# Patient Record
Sex: FEMALE | Race: WHITE | Employment: OTHER | URBAN - METROPOLITAN AREA
[De-identification: names, ages, dates, MRNs, and addresses within clinical notes are randomized per-mention and may not be internally consistent; named-entity substitution may affect disease eponyms.]

---

## 2017-04-06 PROBLEM — G43.009 MIGRAINE WITHOUT AURA AND WITHOUT STATUS MIGRAINOSUS, NOT INTRACTABLE: Status: ACTIVE | Noted: 2017-04-06

## 2017-04-19 PROBLEM — M15.9 GENERALIZED OA: Status: ACTIVE | Noted: 2017-04-19

## 2017-12-10 ENCOUNTER — APPOINTMENT (OUTPATIENT)
Dept: GENERAL RADIOLOGY | Age: 72
End: 2017-12-10
Payer: MEDICARE

## 2017-12-10 ENCOUNTER — HOSPITAL ENCOUNTER (EMERGENCY)
Age: 72
Discharge: HOME | End: 2017-12-10
Attending: EMERGENCY MEDICINE
Payer: MEDICARE

## 2017-12-10 VITALS
DIASTOLIC BLOOD PRESSURE: 73 MMHG | SYSTOLIC BLOOD PRESSURE: 135 MMHG | HEIGHT: 61 IN | OXYGEN SATURATION: 98 % | TEMPERATURE: 97.6 F | RESPIRATION RATE: 18 BRPM | HEART RATE: 72 BPM | BODY MASS INDEX: 33.99 KG/M2 | WEIGHT: 180 LBS

## 2017-12-10 DIAGNOSIS — S86.912A STRAIN OF LEFT KNEE, INITIAL ENCOUNTER: Primary | ICD-10-CM

## 2017-12-10 PROCEDURE — 99283 EMERGENCY DEPT VISIT LOW MDM: CPT

## 2017-12-10 PROCEDURE — 6360000002 HC RX W HCPCS: Performed by: NURSE PRACTITIONER

## 2017-12-10 PROCEDURE — 96372 THER/PROPH/DIAG INJ SC/IM: CPT

## 2017-12-10 PROCEDURE — 6370000000 HC RX 637 (ALT 250 FOR IP): Performed by: NURSE PRACTITIONER

## 2017-12-10 PROCEDURE — 73562 X-RAY EXAM OF KNEE 3: CPT

## 2017-12-10 RX ORDER — HYDROCODONE BITARTRATE AND ACETAMINOPHEN 5; 325 MG/1; MG/1
2 TABLET ORAL ONCE
Status: COMPLETED | OUTPATIENT
Start: 2017-12-10 | End: 2017-12-10

## 2017-12-10 RX ORDER — HYDROCODONE BITARTRATE AND ACETAMINOPHEN 5; 325 MG/1; MG/1
1 TABLET ORAL EVERY 6 HOURS PRN
Qty: 20 TABLET | Refills: 0 | Status: SHIPPED | OUTPATIENT
Start: 2017-12-10 | End: 2018-01-25 | Stop reason: ALTCHOICE

## 2017-12-10 RX ORDER — ORPHENADRINE CITRATE 30 MG/ML
60 INJECTION INTRAMUSCULAR; INTRAVENOUS ONCE
Status: COMPLETED | OUTPATIENT
Start: 2017-12-10 | End: 2017-12-10

## 2017-12-10 RX ADMIN — ORPHENADRINE CITRATE 60 MG: 30 INJECTION INTRAMUSCULAR; INTRAVENOUS at 15:56

## 2017-12-10 RX ADMIN — HYDROCODONE BITARTRATE AND ACETAMINOPHEN 2 TABLET: 5; 325 TABLET ORAL at 15:56

## 2017-12-10 ASSESSMENT — PAIN DESCRIPTION - LOCATION: LOCATION: KNEE;LEG

## 2017-12-10 ASSESSMENT — PAIN DESCRIPTION - DESCRIPTORS: DESCRIPTORS: SHARP;STABBING;CRAMPING

## 2017-12-10 ASSESSMENT — PAIN SCALES - GENERAL
PAINLEVEL_OUTOF10: 10

## 2017-12-10 ASSESSMENT — ENCOUNTER SYMPTOMS
COLOR CHANGE: 0
BACK PAIN: 0

## 2017-12-10 ASSESSMENT — PAIN DESCRIPTION - PAIN TYPE: TYPE: ACUTE PAIN

## 2017-12-10 ASSESSMENT — PAIN DESCRIPTION - ORIENTATION: ORIENTATION: LEFT;POSTERIOR;LOWER

## 2017-12-10 NOTE — ED NOTES
Pt presents to ED with c/o L knee/leg pain; pt states she was painting trim on the floor when she had her knee bent and she heard a pop. Pt states she felt a snap and a numb feeling and could not put pressure on it. Limited movement, Pedal pulse 2+, pain located behind knee cap and travels up thigh stopping before the hip. Rates as 10/10; constant, deep pain.      Laurence Anne RN  12/10/17 6056

## 2017-12-10 NOTE — ED PROVIDER NOTES
I was present in the ED during this patient's evaluation and management by the Advance Practice Provider and was available to address any concerns about their medical management.     Junaid Burgos MD  Attending, Emergency Department      Nithin Michaud MD  12/10/17 5371

## 2017-12-10 NOTE — ED PROVIDER NOTES
breath sounds normal. No respiratory distress. She has no wheezes. She has no rales. Musculoskeletal:        Left knee: She exhibits normal range of motion, no swelling, no deformity and no erythema. Tenderness found. Lateral joint line tenderness noted. Neurological: She is alert and oriented to person, place, and time. Skin: Skin is warm and dry. No rash noted. She is not diaphoretic. Psychiatric: She has a normal mood and affect. Her behavior is normal.   Vitals reviewed. DIAGNOSTIC RESULTS     RADIOLOGY:   Non-plain film images such as CT, Ultrasound and MRI are read by the radiologist. [facility] are visualized and preliminarily interpreted by the emergency physician with the below findings:    Interpretation per the Radiologist below, if available at the time of this note:    Xr Knee Left (3 Views)    Result Date: 12/10/2017  EXAMINATION: 3 VIEWS OF THE LEFT KNEE 12/10/2017 3:37 pm COMPARISON: None. HISTORY: ORDERING SYSTEM PROVIDED HISTORY: pain TECHNOLOGIST PROVIDED HISTORY: Reason for exam:->pain Ordering Physician Provided Reason for Exam: Lt knee pain Acuity: Acute Type of Exam: Initial Mechanism of Injury: heard a \"pop\" when standing FINDINGS: No knee joint effusion. Mild tricompartmental degenerative changes. No significant joint space narrowing. No acute bony process. Mild tricompartmental degenerative changes. No acute bony process.      EMERGENCY DEPARTMENT COURSE and DIFFERENTIAL DIAGNOSIS/MDM:   Vitals:    Vitals:    12/10/17 1523   BP: 135/73   Pulse: 72   Resp: 18   Temp: 97.6 °F (36.4 °C)   SpO2: 98%   Weight: 180 lb (81.6 kg)   Height: 5' 1\" (1.549 m)       MEDICATIONS GIVEN IN THE ED:  Medications   HYDROcodone-acetaminophen (NORCO) 5-325 MG per tablet 2 tablet (2 tablets Oral Given 12/10/17 1556)   orphenadrine (NORFLEX) injection 60 mg (60 mg Intramuscular Given 12/10/17 1556)       CLINICAL DECISION MAKING:  The patient presented alert with a nontoxic appearance and was seen in conjunction with Dr. Pedro Pablo Almanza. Imaging was negative for acute findings. An ace wrap was applied. The application was checked by me and was appropriate; the LLE remained neurovascularly intact. Follow up with pcp, return to ED if condition worsens. A prescription was written for norco. The patient was advised to not drink alcohol, drive, or operate heavy machinery while taking the medication. FINAL IMPRESSION      1. Strain of left knee, initial encounter          DISPOSITION/PLAN   DISPOSITION Decision to Discharge    PATIENT REFERRED TO:   Carolina Shepherd  [address] [place] [address]  [phone]    Schedule an appointment as soon as possible for a visit in 2 days      [address] [facility] ED  [address] [facility]  [phone]    If symptoms worsen      DISCHARGE MEDICATIONS:     New Prescriptions    HYDROCODONE-ACETAMINOPHEN (NORCO) 5-325 MG PER TABLET    Take 1 tablet by mouth every 6 hours as needed for Pain (WARNING:  May cause drowsiness.  May impair ability to operate vehicles or machinery.  Do not use in combination with alcohol.) .            (Please note that portions of this note were completed with a voice recognition program.  Efforts were made to edit the dictations but occasionally words are mis-transcribed.)    CHEMA Vasquez NP  12/10/17 4333

## 2018-01-25 PROBLEM — R07.89 ATYPICAL CHEST PAIN: Status: ACTIVE | Noted: 2018-01-25

## 2018-03-06 PROBLEM — M25.511 ACUTE PAIN OF RIGHT SHOULDER: Status: ACTIVE | Noted: 2018-03-06

## 2018-03-06 PROBLEM — M75.41 IMPINGEMENT SYNDROME OF RIGHT SHOULDER: Status: ACTIVE | Noted: 2018-03-06

## 2018-03-30 ENCOUNTER — HOSPITAL ENCOUNTER (EMERGENCY)
Age: 73
Discharge: HOME | End: 2018-03-30
Attending: EMERGENCY MEDICINE
Payer: MEDICARE

## 2018-03-30 ENCOUNTER — APPOINTMENT (OUTPATIENT)
Dept: GENERAL RADIOLOGY | Age: 73
End: 2018-03-30
Payer: MEDICARE

## 2018-03-30 VITALS
SYSTOLIC BLOOD PRESSURE: 120 MMHG | BODY MASS INDEX: 33.96 KG/M2 | DIASTOLIC BLOOD PRESSURE: 67 MMHG | WEIGHT: 179.9 LBS | RESPIRATION RATE: 22 BRPM | HEIGHT: 61 IN | OXYGEN SATURATION: 98 % | HEART RATE: 88 BPM | TEMPERATURE: 97.7 F

## 2018-03-30 DIAGNOSIS — J10.1 INFLUENZA B: Primary | ICD-10-CM

## 2018-03-30 LAB
DIRECT EXAM: ABNORMAL
Lab: ABNORMAL
SPECIMEN DESCRIPTION: ABNORMAL
STATUS: ABNORMAL

## 2018-03-30 PROCEDURE — 99285 EMERGENCY DEPT VISIT HI MDM: CPT

## 2018-03-30 PROCEDURE — 6370000000 HC RX 637 (ALT 250 FOR IP): Performed by: PHYSICIAN ASSISTANT

## 2018-03-30 PROCEDURE — 87804 INFLUENZA ASSAY W/OPTIC: CPT

## 2018-03-30 PROCEDURE — 71046 X-RAY EXAM CHEST 2 VIEWS: CPT

## 2018-03-30 RX ORDER — BENZONATATE 100 MG/1
100 CAPSULE ORAL 3 TIMES DAILY PRN
Qty: 21 CAPSULE | Refills: 0 | Status: SHIPPED | OUTPATIENT
Start: 2018-03-30 | End: 2018-04-06

## 2018-03-30 RX ORDER — GUAIFENESIN 100 MG/5ML
200 SOLUTION ORAL ONCE
Status: COMPLETED | OUTPATIENT
Start: 2018-03-30 | End: 2018-03-30

## 2018-03-30 RX ORDER — ACETAMINOPHEN AND CODEINE PHOSPHATE 300; 30 MG/1; MG/1
1 TABLET ORAL EVERY 6 HOURS PRN
Qty: 12 TABLET | Refills: 0 | Status: SHIPPED | OUTPATIENT
Start: 2018-03-30 | End: 2018-04-02

## 2018-03-30 RX ORDER — IPRATROPIUM BROMIDE AND ALBUTEROL SULFATE 2.5; .5 MG/3ML; MG/3ML
1 SOLUTION RESPIRATORY (INHALATION) ONCE
Status: DISCONTINUED | OUTPATIENT
Start: 2018-03-30 | End: 2018-03-30 | Stop reason: HOSPADM

## 2018-03-30 RX ADMIN — GUAIFENESIN 200 MG: 100 SOLUTION ORAL at 11:11

## 2018-03-30 ASSESSMENT — PAIN DESCRIPTION - DESCRIPTORS: DESCRIPTORS: CONSTANT;SORE

## 2018-03-30 ASSESSMENT — PAIN DESCRIPTION - LOCATION: LOCATION: THROAT

## 2018-03-30 ASSESSMENT — ENCOUNTER SYMPTOMS
SORE THROAT: 1
WHEEZING: 0
NAUSEA: 1
DIARRHEA: 1
COUGH: 1
ABDOMINAL PAIN: 0
SHORTNESS OF BREATH: 1
VOMITING: 0
BACK PAIN: 1

## 2018-03-30 ASSESSMENT — PAIN SCALES - GENERAL
PAINLEVEL_OUTOF10: 10
PAINLEVEL_OUTOF10: 8

## 2018-03-30 ASSESSMENT — PAIN DESCRIPTION - PAIN TYPE: TYPE: ACUTE PAIN

## 2019-01-25 ENCOUNTER — OFFICE VISIT (OUTPATIENT)
Dept: FAMILY MEDICINE CLINIC | Age: 74
End: 2019-01-25
Payer: MEDICARE

## 2019-01-25 VITALS
RESPIRATION RATE: 16 BRPM | HEART RATE: 67 BPM | HEIGHT: 61 IN | DIASTOLIC BLOOD PRESSURE: 86 MMHG | BODY MASS INDEX: 34.55 KG/M2 | SYSTOLIC BLOOD PRESSURE: 137 MMHG | WEIGHT: 183 LBS

## 2019-01-25 DIAGNOSIS — F32.0 CURRENT MILD EPISODE OF MAJOR DEPRESSIVE DISORDER, UNSPECIFIED WHETHER RECURRENT (HCC): ICD-10-CM

## 2019-01-25 DIAGNOSIS — H92.02 OTALGIA OF LEFT EAR: ICD-10-CM

## 2019-01-25 DIAGNOSIS — Z12.39 SCREENING FOR BREAST CANCER: ICD-10-CM

## 2019-01-25 DIAGNOSIS — I10 ESSENTIAL HYPERTENSION: Primary | ICD-10-CM

## 2019-01-25 DIAGNOSIS — M15.9 GENERALIZED OA: ICD-10-CM

## 2019-01-25 DIAGNOSIS — G43.009 MIGRAINE WITHOUT AURA AND WITHOUT STATUS MIGRAINOSUS, NOT INTRACTABLE: ICD-10-CM

## 2019-01-25 DIAGNOSIS — F32.0 MAJOR DEPRESSIVE DISORDER, SINGLE EPISODE, MILD (HCC): ICD-10-CM

## 2019-01-25 DIAGNOSIS — J44.9 CHRONIC OBSTRUCTIVE PULMONARY DISEASE, UNSPECIFIED COPD TYPE (HCC): ICD-10-CM

## 2019-01-25 DIAGNOSIS — M75.41 IMPINGEMENT SYNDROME OF RIGHT SHOULDER: ICD-10-CM

## 2019-01-25 DIAGNOSIS — K21.9 GASTROESOPHAGEAL REFLUX DISEASE, ESOPHAGITIS PRESENCE NOT SPECIFIED: ICD-10-CM

## 2019-01-25 DIAGNOSIS — E66.9 OBESITY (BMI 30-39.9): ICD-10-CM

## 2019-01-25 PROCEDURE — G8427 DOCREV CUR MEDS BY ELIG CLIN: HCPCS | Performed by: FAMILY MEDICINE

## 2019-01-25 PROCEDURE — G8400 PT W/DXA NO RESULTS DOC: HCPCS | Performed by: FAMILY MEDICINE

## 2019-01-25 PROCEDURE — 3023F SPIROM DOC REV: CPT | Performed by: FAMILY MEDICINE

## 2019-01-25 PROCEDURE — 4040F PNEUMOC VAC/ADMIN/RCVD: CPT | Performed by: FAMILY MEDICINE

## 2019-01-25 PROCEDURE — G8417 CALC BMI ABV UP PARAM F/U: HCPCS | Performed by: FAMILY MEDICINE

## 2019-01-25 PROCEDURE — G8484 FLU IMMUNIZE NO ADMIN: HCPCS | Performed by: FAMILY MEDICINE

## 2019-01-25 PROCEDURE — G8926 SPIRO NO PERF OR DOC: HCPCS | Performed by: FAMILY MEDICINE

## 2019-01-25 PROCEDURE — 1090F PRES/ABSN URINE INCON ASSESS: CPT | Performed by: FAMILY MEDICINE

## 2019-01-25 PROCEDURE — 1036F TOBACCO NON-USER: CPT | Performed by: FAMILY MEDICINE

## 2019-01-25 PROCEDURE — 1101F PT FALLS ASSESS-DOCD LE1/YR: CPT | Performed by: FAMILY MEDICINE

## 2019-01-25 PROCEDURE — 99214 OFFICE O/P EST MOD 30 MIN: CPT | Performed by: FAMILY MEDICINE

## 2019-01-25 PROCEDURE — 3017F COLORECTAL CA SCREEN DOC REV: CPT | Performed by: FAMILY MEDICINE

## 2019-01-25 PROCEDURE — 1123F ACP DISCUSS/DSCN MKR DOCD: CPT | Performed by: FAMILY MEDICINE

## 2019-01-25 RX ORDER — TRAMADOL HYDROCHLORIDE 50 MG/1
50 TABLET ORAL DAILY PRN
Qty: 30 TABLET | Refills: 0 | Status: SHIPPED | OUTPATIENT
Start: 2019-01-25 | End: 2019-02-24

## 2019-01-25 RX ORDER — TRAMADOL HYDROCHLORIDE 50 MG/1
50 TABLET ORAL DAILY PRN
Status: CANCELLED | OUTPATIENT
Start: 2019-01-25 | End: 2019-02-24

## 2019-01-25 RX ORDER — OMEPRAZOLE 20 MG/1
CAPSULE, DELAYED RELEASE ORAL
Qty: 90 CAPSULE | Refills: 0 | Status: SHIPPED | OUTPATIENT
Start: 2019-01-25 | End: 2019-07-16 | Stop reason: SDUPTHER

## 2019-01-25 RX ORDER — ESCITALOPRAM OXALATE 10 MG/1
10 TABLET ORAL DAILY
Qty: 30 TABLET | Refills: 5 | Status: ON HOLD | OUTPATIENT
Start: 2019-01-25 | End: 2019-10-31

## 2019-01-25 ASSESSMENT — ENCOUNTER SYMPTOMS
PHOTOPHOBIA: 0
ABDOMINAL DISTENTION: 0
CHEST TIGHTNESS: 0
FACIAL SWELLING: 0
BACK PAIN: 1
TROUBLE SWALLOWING: 0
SHORTNESS OF BREATH: 0
DIARRHEA: 0
CONSTIPATION: 0
SINUS PRESSURE: 0
EYE DISCHARGE: 0
COLOR CHANGE: 0
SORE THROAT: 0
APNEA: 0
WHEEZING: 0
EYE PAIN: 0
VOMITING: 0
ANAL BLEEDING: 0
NAUSEA: 0
ABDOMINAL PAIN: 0

## 2019-04-29 ENCOUNTER — OFFICE VISIT (OUTPATIENT)
Dept: FAMILY MEDICINE CLINIC | Age: 74
End: 2019-04-29
Payer: MEDICARE

## 2019-04-29 VITALS
OXYGEN SATURATION: 95 % | DIASTOLIC BLOOD PRESSURE: 81 MMHG | BODY MASS INDEX: 35.68 KG/M2 | HEART RATE: 73 BPM | WEIGHT: 189 LBS | HEIGHT: 61 IN | RESPIRATION RATE: 14 BRPM | SYSTOLIC BLOOD PRESSURE: 136 MMHG

## 2019-04-29 DIAGNOSIS — F32.1 CURRENT MODERATE EPISODE OF MAJOR DEPRESSIVE DISORDER WITHOUT PRIOR EPISODE (HCC): ICD-10-CM

## 2019-04-29 DIAGNOSIS — J44.9 CHRONIC OBSTRUCTIVE PULMONARY DISEASE, UNSPECIFIED COPD TYPE (HCC): ICD-10-CM

## 2019-04-29 DIAGNOSIS — G43.009 MIGRAINE WITHOUT AURA AND WITHOUT STATUS MIGRAINOSUS, NOT INTRACTABLE: ICD-10-CM

## 2019-04-29 DIAGNOSIS — M15.9 GENERALIZED OA: ICD-10-CM

## 2019-04-29 DIAGNOSIS — I10 ESSENTIAL HYPERTENSION: Primary | ICD-10-CM

## 2019-04-29 DIAGNOSIS — K57.92 ACUTE DIVERTICULITIS: ICD-10-CM

## 2019-04-29 DIAGNOSIS — E66.9 OBESITY (BMI 30-39.9): ICD-10-CM

## 2019-04-29 DIAGNOSIS — K21.00 GASTROESOPHAGEAL REFLUX DISEASE WITH ESOPHAGITIS: ICD-10-CM

## 2019-04-29 DIAGNOSIS — M75.41 IMPINGEMENT SYNDROME OF RIGHT SHOULDER: ICD-10-CM

## 2019-04-29 PROCEDURE — G8400 PT W/DXA NO RESULTS DOC: HCPCS | Performed by: FAMILY MEDICINE

## 2019-04-29 PROCEDURE — 3023F SPIROM DOC REV: CPT | Performed by: FAMILY MEDICINE

## 2019-04-29 PROCEDURE — 1036F TOBACCO NON-USER: CPT | Performed by: FAMILY MEDICINE

## 2019-04-29 PROCEDURE — 3017F COLORECTAL CA SCREEN DOC REV: CPT | Performed by: FAMILY MEDICINE

## 2019-04-29 PROCEDURE — G8427 DOCREV CUR MEDS BY ELIG CLIN: HCPCS | Performed by: FAMILY MEDICINE

## 2019-04-29 PROCEDURE — 4040F PNEUMOC VAC/ADMIN/RCVD: CPT | Performed by: FAMILY MEDICINE

## 2019-04-29 PROCEDURE — 1090F PRES/ABSN URINE INCON ASSESS: CPT | Performed by: FAMILY MEDICINE

## 2019-04-29 PROCEDURE — 99214 OFFICE O/P EST MOD 30 MIN: CPT | Performed by: FAMILY MEDICINE

## 2019-04-29 PROCEDURE — 1123F ACP DISCUSS/DSCN MKR DOCD: CPT | Performed by: FAMILY MEDICINE

## 2019-04-29 PROCEDURE — G8926 SPIRO NO PERF OR DOC: HCPCS | Performed by: FAMILY MEDICINE

## 2019-04-29 PROCEDURE — G8417 CALC BMI ABV UP PARAM F/U: HCPCS | Performed by: FAMILY MEDICINE

## 2019-04-29 RX ORDER — TOPIRAMATE 25 MG/1
25 TABLET ORAL 2 TIMES DAILY
Qty: 60 TABLET | Refills: 2 | Status: SHIPPED | OUTPATIENT
Start: 2019-04-29 | End: 2019-08-06 | Stop reason: SDUPTHER

## 2019-04-29 RX ORDER — OMEPRAZOLE 20 MG/1
20 CAPSULE, DELAYED RELEASE ORAL
Qty: 58 CAPSULE | Refills: 0 | Status: SHIPPED | OUTPATIENT
Start: 2019-04-29 | End: 2020-06-12 | Stop reason: SDUPTHER

## 2019-04-29 RX ORDER — BUPROPION HYDROCHLORIDE 150 MG/1
150 TABLET ORAL EVERY MORNING
Qty: 30 TABLET | Refills: 2 | Status: SHIPPED | OUTPATIENT
Start: 2019-04-29 | End: 2019-08-06 | Stop reason: SDUPTHER

## 2019-04-29 RX ORDER — CIPROFLOXACIN 500 MG/1
500 TABLET, FILM COATED ORAL 2 TIMES DAILY
Qty: 14 TABLET | Refills: 0 | Status: SHIPPED | OUTPATIENT
Start: 2019-04-29 | End: 2019-05-06

## 2019-04-29 ASSESSMENT — ENCOUNTER SYMPTOMS
CONSTIPATION: 0
PHOTOPHOBIA: 0
DIARRHEA: 0
APNEA: 0
SHORTNESS OF BREATH: 0
SORE THROAT: 0
VOMITING: 0
TROUBLE SWALLOWING: 0
ABDOMINAL DISTENTION: 0
NAUSEA: 0
FACIAL SWELLING: 0
BACK PAIN: 1
EYE PAIN: 0
COLOR CHANGE: 0
CHEST TIGHTNESS: 0
ABDOMINAL PAIN: 1
WHEEZING: 0
ANAL BLEEDING: 0
SINUS PRESSURE: 0
EYE DISCHARGE: 0

## 2019-04-29 NOTE — PROGRESS NOTES
Homer Sharma MD, PhD FAAFP  [address] [facility] [address]      Sarah Santiago is a 72 y.o. female who presents today for her medical conditions/complaints as noted below. Sarah Santiago is c/o of   Chief Complaint   Patient presents with    Hypertension     ck up    Depression         HPI:     Hypertension   This is a chronic problem. The problem is unchanged. The problem is controlled. Pertinent negatives include no chest pain, neck pain, palpitations or shortness of breath. Abdominal Pain   This is a new problem. The current episode started in the past 7 days. The onset quality is gradual. The pain is located in the LLQ. Associated symptoms include arthralgias. Pertinent negatives include no constipation, diarrhea, fever, frequency, hematuria, nausea or vomiting.        No results found for: LABA1C          ( goal A1C is < 7)   No results found for: LABMICR  No results found for: LDLCHOLESTEROL, LDLCALC    (goal LDL is <100)   AST (U/L)   Date Value   12/10/2011 17     ALT (U/L)   Date Value   12/10/2011 19     BUN (mg/dL)   Date Value   03/30/2014 16     BP Readings from Last 3 Encounters:   04/29/19 136/81   01/25/19 137/86   12/18/18 124/78          (goal 120/80)    Past Medical History:   Diagnosis Date    Arthritis 2012    GENERALIZED    Fibromyalgia 2012    GERD (gastroesophageal reflux disease) DX PRIOR TO 2004    ON RX     GERD (gastroesophageal reflux disease)     Hyperlipidemia     NO RX ALLERGIC TO STATINS    Hypertension     Kidney stone 2012 & 2013    X 2-SEVERAL STONES EACH TIME, PASSED ON OWN    Pneumonia 2012    Recurrent UTI     Sleep apnea 2012    HAS MACHINE BUT DOES NOT USE    Vision abnormalities 2014    RT DISTORTED, LT DECREASED    Wears glasses       Past Surgical History:   Procedure Laterality Date   [address] [facility] TOTAL    TONSILLECTOMY  1962    VITRECTOMY Right 2014       Family History   Problem Relation Age of Onset    Cancer Maternal Grandmother         UNKNOWN       Social History     Tobacco Use    Smoking status: Former Smoker     Last attempt to quit: 10/31/1994     Years since quittin.5    Smokeless tobacco: Never Used   Substance Use Topics    Alcohol use: Yes     Comment: 2 DRINKS A WEEK      Current Outpatient Medications   Medication Sig Dispense Refill    buPROPion (WELLBUTRIN XL) 150 MG extended release tablet Take 1 tablet by mouth every morning 30 tablet 2    topiramate (TOPAMAX) 25 MG tablet Take 1 tablet by mouth 2 times daily 60 tablet 2    omeprazole (PRILOSEC) 20 MG delayed release capsule Take 1 capsule by mouth 2 times daily (before meals) 58 capsule 0    ciprofloxacin (CIPRO) 500 MG tablet Take 1 tablet by mouth 2 times daily for 7 days 14 tablet 0    omeprazole (PRILOSEC) 20 MG delayed release capsule TAKE ONE CAPSULE BY MOUTH ONCE DAILY 90 capsule 0    escitalopram (LEXAPRO) 10 MG tablet Take 1 tablet by mouth daily 30 tablet 5    atropine 1 % ophthalmic solution Place 1 drop into the right eye 2 times daily. 1 Bottle 0     No current facility-administered medications for this visit.       Allergies   Allergen Reactions    Cefdinir Itching    Morphine Nausea And Vomiting    Avelox [Moxifloxacin]      UNSURE OF REACTION      Ciprofloxacin Itching    Quinolones      UNSURE OF REACTION      Statins Other (See Comments)     MUSCLE WEAKNESS      Sulfa Antibiotics Itching       Health Maintenance   Topic Date Due    Hepatitis C screen  1945    DTaP/Tdap/Td vaccine (1 - Tdap) 1966    Shingles Vaccine (1 of 2) 1997    Colon cancer screen colonoscopy  1997    Pneumococcal 65+ years Vaccine (1 of 2 - PCV13) 2012    Flu vaccine (Season Ended) 2019    Breast cancer screen  2019    Lipid screen  2023    DEXA (modify frequency per FRAX score)  Completed       Subjective:      Review of Systems   Constitutional: Negative for fatigue, fever and unexpected weight change. HENT: Negative for congestion, ear discharge, facial swelling, sinus pressure, sore throat and trouble swallowing. Eyes: Negative for photophobia, pain and discharge. Respiratory: Negative for apnea, chest tightness, shortness of breath and wheezing. Cardiovascular: Negative for chest pain and palpitations. Gastrointestinal: Positive for abdominal pain. Negative for abdominal distention, anal bleeding, constipation, diarrhea, nausea and vomiting. Endocrine: Negative for cold intolerance, heat intolerance, polydipsia, polyphagia and polyuria. Genitourinary: Negative for difficulty urinating, flank pain, frequency and hematuria. Musculoskeletal: Positive for arthralgias and back pain. Negative for gait problem and neck pain. Skin: Negative for color change and rash. Neurological: Negative for dizziness, syncope, facial asymmetry, speech difficulty and light-headedness. Hematological: Negative for adenopathy. Psychiatric/Behavioral: Positive for dysphoric mood and sleep disturbance. Negative for agitation, behavioral problems, confusion, hallucinations and suicidal ideas. The patient is nervous/anxious. The patient is not hyperactive. Objective:     Physical Exam   Constitutional: She is oriented to person, place, and time. She appears well-developed. No distress. HENT:   Head: Normocephalic. Neck: Normal range of motion. Neck supple. No thyromegaly present. Cardiovascular: Normal rate, regular rhythm and normal heart sounds. No murmur heard. Pulmonary/Chest: Breath sounds normal. She has no wheezes. She has no rales. She exhibits no tenderness. Abdominal: Soft. Bowel sounds are normal. She exhibits no distension and no mass. There is tenderness in the left upper quadrant. There is no rebound and no guarding.    Musculoskeletal: Normal 7:07 PMVisit Information    Have you changed or started any medications since your last visit including any over-the-counter medicines, vitamins, or herbal medicines? no   Are you having any side effects from any of your medications? -  no  Have you stopped taking any of your medications? Is so, why? -  no    Have you seen any other physician or provider since your last visit? No  Have you had any other diagnostic tests since your last visit? No  Have you been seen in the emergency room and/or had an admission to a hospital since we last saw you? No  Have you had your routine dental cleaning in the past 6 months? no    Have you activated your [employer] account? If not, what are your barriers?  No:     Patient Care Team:  Kumar Akhtar MD as PCP - Gina Yanez MD as PCP - [facility] Attributed Provider    Medical History Review  Past Medical, Family, and Social History reviewed and does not contribute to the patient presenting condition    Health Maintenance   Topic Date Due    Hepatitis C screen  1945    DTaP/Tdap/Td vaccine (1 - Tdap) 11/08/1966    Shingles Vaccine (1 of 2) 11/08/1997    Colon cancer screen colonoscopy  11/08/1997    Pneumococcal 65+ years Vaccine (1 of 2 - PCV13) 11/08/2012    Flu vaccine (Season Ended) 09/01/2019    Breast cancer screen  12/19/2019    Lipid screen  12/19/2023    DEXA (modify frequency per FRAX score)  Completed

## 2019-05-13 ENCOUNTER — OFFICE VISIT (OUTPATIENT)
Dept: FAMILY MEDICINE CLINIC | Age: 74
End: 2019-05-13
Payer: MEDICARE

## 2019-05-13 VITALS
WEIGHT: 186.6 LBS | BODY MASS INDEX: 35.23 KG/M2 | HEART RATE: 82 BPM | RESPIRATION RATE: 16 BRPM | SYSTOLIC BLOOD PRESSURE: 136 MMHG | HEIGHT: 61 IN | DIASTOLIC BLOOD PRESSURE: 86 MMHG | OXYGEN SATURATION: 97 %

## 2019-05-13 DIAGNOSIS — M15.9 GENERALIZED OA: ICD-10-CM

## 2019-05-13 DIAGNOSIS — G43.009 MIGRAINE WITHOUT AURA AND WITHOUT STATUS MIGRAINOSUS, NOT INTRACTABLE: ICD-10-CM

## 2019-05-13 DIAGNOSIS — I10 ESSENTIAL HYPERTENSION: Primary | ICD-10-CM

## 2019-05-13 DIAGNOSIS — M54.40 ACUTE RIGHT-SIDED LOW BACK PAIN WITH SCIATICA, SCIATICA LATERALITY UNSPECIFIED: ICD-10-CM

## 2019-05-13 DIAGNOSIS — J44.9 CHRONIC OBSTRUCTIVE PULMONARY DISEASE, UNSPECIFIED COPD TYPE (HCC): ICD-10-CM

## 2019-05-13 DIAGNOSIS — F32.0 MAJOR DEPRESSIVE DISORDER, SINGLE EPISODE, MILD (HCC): ICD-10-CM

## 2019-05-13 DIAGNOSIS — E66.9 OBESITY (BMI 30-39.9): ICD-10-CM

## 2019-05-13 DIAGNOSIS — K21.9 GASTROESOPHAGEAL REFLUX DISEASE, ESOPHAGITIS PRESENCE NOT SPECIFIED: ICD-10-CM

## 2019-05-13 DIAGNOSIS — M75.41 IMPINGEMENT SYNDROME OF RIGHT SHOULDER: ICD-10-CM

## 2019-05-13 DIAGNOSIS — K21.00 GASTROESOPHAGEAL REFLUX DISEASE WITH ESOPHAGITIS: ICD-10-CM

## 2019-05-13 PROCEDURE — G8427 DOCREV CUR MEDS BY ELIG CLIN: HCPCS | Performed by: FAMILY MEDICINE

## 2019-05-13 PROCEDURE — 1123F ACP DISCUSS/DSCN MKR DOCD: CPT | Performed by: FAMILY MEDICINE

## 2019-05-13 PROCEDURE — G8417 CALC BMI ABV UP PARAM F/U: HCPCS | Performed by: FAMILY MEDICINE

## 2019-05-13 PROCEDURE — 3017F COLORECTAL CA SCREEN DOC REV: CPT | Performed by: FAMILY MEDICINE

## 2019-05-13 PROCEDURE — 1036F TOBACCO NON-USER: CPT | Performed by: FAMILY MEDICINE

## 2019-05-13 PROCEDURE — 1090F PRES/ABSN URINE INCON ASSESS: CPT | Performed by: FAMILY MEDICINE

## 2019-05-13 PROCEDURE — 96372 THER/PROPH/DIAG INJ SC/IM: CPT | Performed by: FAMILY MEDICINE

## 2019-05-13 PROCEDURE — 99214 OFFICE O/P EST MOD 30 MIN: CPT | Performed by: FAMILY MEDICINE

## 2019-05-13 PROCEDURE — 3023F SPIROM DOC REV: CPT | Performed by: FAMILY MEDICINE

## 2019-05-13 PROCEDURE — 4040F PNEUMOC VAC/ADMIN/RCVD: CPT | Performed by: FAMILY MEDICINE

## 2019-05-13 PROCEDURE — G8926 SPIRO NO PERF OR DOC: HCPCS | Performed by: FAMILY MEDICINE

## 2019-05-13 PROCEDURE — G8400 PT W/DXA NO RESULTS DOC: HCPCS | Performed by: FAMILY MEDICINE

## 2019-05-13 RX ORDER — METHYLPREDNISOLONE ACETATE 40 MG/ML
40 INJECTION, SUSPENSION INTRA-ARTICULAR; INTRALESIONAL; INTRAMUSCULAR; SOFT TISSUE ONCE
Status: COMPLETED | OUTPATIENT
Start: 2019-05-13 | End: 2019-05-13

## 2019-05-13 RX ADMIN — METHYLPREDNISOLONE ACETATE 40 MG: 40 INJECTION, SUSPENSION INTRA-ARTICULAR; INTRALESIONAL; INTRAMUSCULAR; SOFT TISSUE at 15:05

## 2019-05-13 ASSESSMENT — ENCOUNTER SYMPTOMS
TROUBLE SWALLOWING: 0
APNEA: 0
EYE PAIN: 0
ABDOMINAL PAIN: 0
COLOR CHANGE: 0
SHORTNESS OF BREATH: 0
EYE DISCHARGE: 0
BACK PAIN: 1
SINUS PRESSURE: 0
SORE THROAT: 0
VOMITING: 0
DIARRHEA: 0
WHEEZING: 0
ABDOMINAL DISTENTION: 0
ANAL BLEEDING: 0
NAUSEA: 0
CONSTIPATION: 0
CHEST TIGHTNESS: 0
PHOTOPHOBIA: 0
FACIAL SWELLING: 0

## 2019-05-14 NOTE — PROGRESS NOTES
Homer Sharma MD, PhD FAAFP  [address] [facility] [address] [facility] [address]      Sarah Santiago is a 72 y.o. female who presents today for her medical conditions/complaints as noted below. Sarah Santiago is c/o of   Chief Complaint   Patient presents with    Back Pain     right side pain for a couple day. since yesterday the pain has got bad.  Hypertension         HPI:     Back Pain   This is a chronic problem. The current episode started more than 1 year ago. The problem has been gradually worsening since onset. The pain is present in the lumbar spine. The pain radiates to the right knee and right thigh. The pain is at a severity of 7/10. The pain is moderate. The pain is worse during the night. The symptoms are aggravated by coughing, position, standing, stress, twisting, lying down and bending. Stiffness is present in the morning. Associated symptoms include numbness, paresthesias and weakness. Pertinent negatives include no abdominal pain, chest pain or fever. Hypertension   This is a chronic problem. The current episode started more than 1 year ago. The problem is unchanged. The problem is controlled. Pertinent negatives include no chest pain, neck pain, palpitations or shortness of breath.        No results found for: LABA1C          ( goal A1C is < 7)   No results found for: LABMICR  No results found for: LDLCHOLESTEROL, LDLCALC    (goal LDL is <100)   AST (U/L)   Date Value   12/10/2011 17     ALT (U/L)   Date Value   12/10/2011 19     BUN (mg/dL)   Date Value   03/30/2014 16     BP Readings from Last 3 Encounters:   05/13/19 136/86   04/29/19 136/81   01/25/19 137/86          (goal 120/80)    Past Medical History:   Diagnosis Date    Arthritis 2012    GENERALIZED    Fibromyalgia 2012    GERD (gastroesophageal reflux disease) DX PRIOR TO 2004    ON RX     GERD (gastroesophageal reflux disease)     Hyperlipidemia     NO RX ALLERGIC TO STATINS  Hypertension     Kidney stone 2012 & 2013    X 2-SEVERAL STONES EACH TIME, PASSED ON OWN    Pneumonia 2012    Recurrent UTI     Sleep apnea 2012    HAS MACHINE BUT DOES NOT USE    Vision abnormalities     RT DISTORTED, LT DECREASED    Wears glasses       Past Surgical History:   Procedure Laterality Date    APPENDECTOMY  1963     SECTION, CLASSIC  1964, 0, & [address]    VITRECTOMY Right 2014       Family History   Problem Relation Age of Onset    Cancer Maternal Grandmother         UNKNOWN       Social History     Tobacco Use    Smoking status: Former Smoker     Last attempt to quit: 10/31/1994     Years since quittin.5    Smokeless tobacco: Never Used   Substance Use Topics    Alcohol use: Yes     Comment: 2 DRINKS A WEEK      Current Outpatient Medications   Medication Sig Dispense Refill    buPROPion (WELLBUTRIN XL) 150 MG extended release tablet Take 1 tablet by mouth every morning 30 tablet 2    topiramate (TOPAMAX) 25 MG tablet Take 1 tablet by mouth 2 times daily 60 tablet 2    omeprazole (PRILOSEC) 20 MG delayed release capsule Take 1 capsule by mouth 2 times daily (before meals) 58 capsule 0    escitalopram (LEXAPRO) 10 MG tablet Take 1 tablet by mouth daily 30 tablet 5    omeprazole (PRILOSEC) 20 MG delayed release capsule TAKE ONE CAPSULE BY MOUTH ONCE DAILY 90 capsule 0    atropine 1 % ophthalmic solution Place 1 drop into the right eye 2 times daily. 1 Bottle 0     No current facility-administered medications for this visit.       Allergies   Allergen Reactions    Cefdinir Itching    Morphine Nausea And Vomiting    Avelox [Moxifloxacin]      UNSURE OF REACTION      Ciprofloxacin Itching    Quinolones      UNSURE OF REACTION      Statins Other (See Comments)     MUSCLE WEAKNESS      Sulfa Antibiotics Itching       Health Maintenance   Topic Date Due    Hepatitis C screen  1945    DTaP/Tdap/Td vaccine (1 - Tdap) 11/08/1966    Shingles Vaccine (1 of 2) 11/08/1997    Colon cancer screen colonoscopy  11/08/1997    Pneumococcal 65+ years Vaccine (1 of 2 - PCV13) 11/08/2012    Flu vaccine (Season Ended) 09/01/2019    Breast cancer screen  12/19/2019    Lipid screen  12/19/2023    DEXA (modify frequency per FRAX score)  Completed       Subjective:      Review of Systems   Constitutional: Negative for fatigue, fever and unexpected weight change. HENT: Negative for congestion, ear discharge, facial swelling, sinus pressure, sore throat and trouble swallowing. Eyes: Negative for photophobia, pain and discharge. Respiratory: Negative for apnea, chest tightness, shortness of breath and wheezing. Cardiovascular: Negative for chest pain and palpitations. Gastrointestinal: Negative for abdominal distention, abdominal pain, anal bleeding, constipation, diarrhea, nausea and vomiting. Endocrine: Negative for cold intolerance, heat intolerance, polydipsia, polyphagia and polyuria. Genitourinary: Negative for difficulty urinating, flank pain, frequency and hematuria. Musculoskeletal: Positive for arthralgias, back pain and gait problem. Negative for neck pain. Skin: Negative for color change and rash. Neurological: Positive for weakness, numbness and paresthesias. Negative for dizziness, syncope, facial asymmetry, speech difficulty and light-headedness. Hematological: Negative for adenopathy. Psychiatric/Behavioral: Positive for dysphoric mood and sleep disturbance. Negative for agitation, behavioral problems, confusion, hallucinations and suicidal ideas. The patient is nervous/anxious. The patient is not hyperactive. Objective:     Physical Exam   Constitutional: She is oriented to person, place, and time. She appears well-developed. No distress. HENT:   Head: Normocephalic. Neck: Normal range of motion. Neck supple. No thyromegaly present.    Cardiovascular: Normal rate, regular rhythm and normal heart sounds. No murmur heard. Pulmonary/Chest: Breath sounds normal. She has no wheezes. She has no rales. She exhibits no tenderness. Abdominal: Soft. Bowel sounds are normal. She exhibits no distension and no mass. There is no tenderness. There is no rebound and no guarding. Musculoskeletal: Normal range of motion. She exhibits no edema. Lymphadenopathy:     She has no cervical adenopathy. Neurological: She is alert and oriented to person, place, and time. Skin: Skin is warm. No rash noted. Psychiatric: She has a normal mood and affect. Nursing note and vitals reviewed. /86   Pulse 82   Resp 16   Ht 5' 0.98\" (1.549 m)   Wt 186 lb 9.6 oz (84.6 kg)   LMP 10/31/1975 (Approximate)   SpO2 97%   BMI 35.28 kg/m²     Assessment:       Diagnosis Orders   1. Essential hypertension     2. Major depressive disorder, single episode, mild (HCC)     3. Obesity (BMI 30-39.9)     4. Gastroesophageal reflux disease with esophagitis     5. Impingement syndrome of right shoulder     6. Generalized OA     7. Migraine without aura and without status migrainosus, not intractable     8. Gastroesophageal reflux disease, esophagitis presence not specified     9. Chronic obstructive pulmonary disease, unspecified COPD type ([facility] [address]     10. Acute right-sided low back pain with sciatica, sciatica laterality unspecified            L 4 mid line and right paraspinal area cleansed with alcohol, ethylen chloride spray applied, injection with Xylocaine and Depo-Medrol given with subcutaneous needle without complications. Pt tolerated well. Plan:    Isometric lumbar exercise daily  Avoid heavy lifting or repetitive bending  The current medical regimen is effective;  continue present plan and medications. Weight reduction    Return in about 1 month (around 6/10/2019), or (trigger point lumbar steroid injection was done today), for follow up.     No orders of the defined types were placed in this encounter. Patient given educational materials - see patient instructions. Discussed use, benefit,and side effects of prescribed medications. All patient questions answered. Pt voiced understanding. Reviewed health maintenance. Instructed to continue current medications, diet and exercise. Patient agreed withtreatment plan. Follow up as directed.      Electronically signed by Ran Cerda MD on 5/13/2019 at 8:11 PM

## 2019-07-16 DIAGNOSIS — K21.9 GASTROESOPHAGEAL REFLUX DISEASE, ESOPHAGITIS PRESENCE NOT SPECIFIED: Primary | ICD-10-CM

## 2019-07-16 RX ORDER — OMEPRAZOLE 20 MG/1
CAPSULE, DELAYED RELEASE ORAL
Qty: 90 CAPSULE | Refills: 1 | Status: SHIPPED | OUTPATIENT
Start: 2019-07-16 | End: 2019-08-06 | Stop reason: SDUPTHER

## 2019-08-06 ENCOUNTER — OFFICE VISIT (OUTPATIENT)
Dept: FAMILY MEDICINE CLINIC | Age: 74
End: 2019-08-06
Payer: MEDICARE

## 2019-08-06 VITALS
HEART RATE: 74 BPM | SYSTOLIC BLOOD PRESSURE: 125 MMHG | DIASTOLIC BLOOD PRESSURE: 72 MMHG | OXYGEN SATURATION: 94 % | RESPIRATION RATE: 14 BRPM | HEIGHT: 61 IN | BODY MASS INDEX: 34.55 KG/M2 | WEIGHT: 183 LBS

## 2019-08-06 DIAGNOSIS — M15.9 GENERALIZED OA: ICD-10-CM

## 2019-08-06 DIAGNOSIS — K21.00 GASTROESOPHAGEAL REFLUX DISEASE WITH ESOPHAGITIS: ICD-10-CM

## 2019-08-06 DIAGNOSIS — F32.0 MAJOR DEPRESSIVE DISORDER, SINGLE EPISODE, MILD (HCC): ICD-10-CM

## 2019-08-06 DIAGNOSIS — F32.1 CURRENT MODERATE EPISODE OF MAJOR DEPRESSIVE DISORDER WITHOUT PRIOR EPISODE (HCC): ICD-10-CM

## 2019-08-06 DIAGNOSIS — H92.02 OTALGIA OF LEFT EAR: ICD-10-CM

## 2019-08-06 DIAGNOSIS — K57.92 ACUTE DIVERTICULITIS: ICD-10-CM

## 2019-08-06 DIAGNOSIS — I10 ESSENTIAL HYPERTENSION: Primary | ICD-10-CM

## 2019-08-06 DIAGNOSIS — G43.009 MIGRAINE WITHOUT AURA AND WITHOUT STATUS MIGRAINOSUS, NOT INTRACTABLE: ICD-10-CM

## 2019-08-06 DIAGNOSIS — J44.9 CHRONIC OBSTRUCTIVE PULMONARY DISEASE, UNSPECIFIED COPD TYPE (HCC): ICD-10-CM

## 2019-08-06 DIAGNOSIS — M75.41 IMPINGEMENT SYNDROME OF RIGHT SHOULDER: ICD-10-CM

## 2019-08-06 DIAGNOSIS — E66.9 OBESITY (BMI 30-39.9): ICD-10-CM

## 2019-08-06 PROCEDURE — 99214 OFFICE O/P EST MOD 30 MIN: CPT | Performed by: FAMILY MEDICINE

## 2019-08-06 PROCEDURE — 3017F COLORECTAL CA SCREEN DOC REV: CPT | Performed by: FAMILY MEDICINE

## 2019-08-06 PROCEDURE — 4040F PNEUMOC VAC/ADMIN/RCVD: CPT | Performed by: FAMILY MEDICINE

## 2019-08-06 PROCEDURE — G8400 PT W/DXA NO RESULTS DOC: HCPCS | Performed by: FAMILY MEDICINE

## 2019-08-06 PROCEDURE — G8926 SPIRO NO PERF OR DOC: HCPCS | Performed by: FAMILY MEDICINE

## 2019-08-06 PROCEDURE — 1123F ACP DISCUSS/DSCN MKR DOCD: CPT | Performed by: FAMILY MEDICINE

## 2019-08-06 PROCEDURE — G8417 CALC BMI ABV UP PARAM F/U: HCPCS | Performed by: FAMILY MEDICINE

## 2019-08-06 PROCEDURE — 1090F PRES/ABSN URINE INCON ASSESS: CPT | Performed by: FAMILY MEDICINE

## 2019-08-06 PROCEDURE — G8427 DOCREV CUR MEDS BY ELIG CLIN: HCPCS | Performed by: FAMILY MEDICINE

## 2019-08-06 PROCEDURE — 1036F TOBACCO NON-USER: CPT | Performed by: FAMILY MEDICINE

## 2019-08-06 PROCEDURE — 3023F SPIROM DOC REV: CPT | Performed by: FAMILY MEDICINE

## 2019-08-06 RX ORDER — CLONAZEPAM 0.5 MG/1
TABLET ORAL
Qty: 45 TABLET | Refills: 0 | Status: ON HOLD | OUTPATIENT
Start: 2019-08-06 | End: 2019-10-31

## 2019-08-06 RX ORDER — BUPROPION HYDROCHLORIDE 150 MG/1
150 TABLET ORAL EVERY MORNING
Qty: 30 TABLET | Refills: 2 | Status: SHIPPED | OUTPATIENT
Start: 2019-08-06 | End: 2020-10-05

## 2019-08-06 RX ORDER — TOPIRAMATE 25 MG/1
25 TABLET ORAL 2 TIMES DAILY
Qty: 60 TABLET | Refills: 2 | Status: ON HOLD | OUTPATIENT
Start: 2019-08-06 | End: 2019-10-31

## 2019-08-06 ASSESSMENT — ENCOUNTER SYMPTOMS
VOMITING: 0
NAUSEA: 0
APNEA: 0
DIARRHEA: 0
ABDOMINAL PAIN: 0
PHOTOPHOBIA: 0
EYE DISCHARGE: 0
SORE THROAT: 0
FACIAL SWELLING: 0
CHEST TIGHTNESS: 0
BACK PAIN: 0
ANAL BLEEDING: 0
TROUBLE SWALLOWING: 0
COLOR CHANGE: 0
ABDOMINAL DISTENTION: 0
SHORTNESS OF BREATH: 0
CONSTIPATION: 0
WHEEZING: 0
EYE PAIN: 0
SINUS PRESSURE: 0

## 2019-08-06 NOTE — PROGRESS NOTES
benefit,and side effects of prescribed medications. All patient questions answered. Pt voiced understanding. Reviewed health maintenance. Instructed to continue current medications, diet and exercise. Patient agreed withtreatment plan. Follow up as directed. Electronically signed by Anthony Epperson MD on 8/6/2019 at [address] [address] [facility]    Have you changed or started any medications since your last visit including any over-the-counter medicines, vitamins, or herbal medicines? no   Are you having any side effects from any of your medications? -  no  Have you stopped taking any of your medications? Is so, why? -  no    Have you seen any other physician or provider since your last visit? No  Have you had any other diagnostic tests since your last visit? No  Have you been seen in the emergency room and/or had an admission to a hospital since we last saw you? No  Have you had your routine dental cleaning in the past 6 months? no    Have you activated your [employer] account? If not, what are your barriers?  No:      Patient Care Team:  Anthony Epperson MD as PCP - Louie Simeon MD as PCP - [facility] Provider    Medical History Review  Past Medical, Family, and Social History reviewed and does not contribute to the patient presenting condition    Health Maintenance   Topic Date Due    Hepatitis C screen  1945    DTaP/Tdap/Td vaccine (1 - Tdap) 11/08/1966    Shingles Vaccine (1 of 2) 11/08/1997    Colon cancer screen colonoscopy  11/08/1997    Annual Wellness Visit (AWV)  11/08/2010    Pneumococcal 65+ years Vaccine (1 of 2 - PCV13) 11/08/2012    Flu vaccine (1) 09/01/2019    Breast cancer screen  12/19/2019    Lipid screen  12/19/2023    DEXA (modify frequency per FRAX score)  Completed

## 2019-10-03 ENCOUNTER — TELEPHONE (OUTPATIENT)
Dept: FAMILY MEDICINE CLINIC | Age: 74
End: 2019-10-03

## 2019-10-04 RX ORDER — SULFAMETHOXAZOLE AND TRIMETHOPRIM 800; 160 MG/1; MG/1
1 TABLET ORAL 2 TIMES DAILY
Qty: 14 TABLET | Refills: 0 | Status: SHIPPED | OUTPATIENT
Start: 2019-10-04 | End: 2019-10-11

## 2019-10-10 ENCOUNTER — OFFICE VISIT (OUTPATIENT)
Dept: FAMILY MEDICINE CLINIC | Age: 74
End: 2019-10-10
Payer: MEDICARE

## 2019-10-10 VITALS
HEIGHT: 61 IN | SYSTOLIC BLOOD PRESSURE: 141 MMHG | WEIGHT: 186 LBS | DIASTOLIC BLOOD PRESSURE: 76 MMHG | BODY MASS INDEX: 35.12 KG/M2 | OXYGEN SATURATION: 95 % | HEART RATE: 76 BPM

## 2019-10-10 DIAGNOSIS — M75.41 IMPINGEMENT SYNDROME OF RIGHT SHOULDER: ICD-10-CM

## 2019-10-10 DIAGNOSIS — K21.00 GASTROESOPHAGEAL REFLUX DISEASE WITH ESOPHAGITIS: ICD-10-CM

## 2019-10-10 DIAGNOSIS — J44.9 CHRONIC OBSTRUCTIVE PULMONARY DISEASE, UNSPECIFIED COPD TYPE (HCC): ICD-10-CM

## 2019-10-10 DIAGNOSIS — F41.9 ANXIETY: ICD-10-CM

## 2019-10-10 DIAGNOSIS — E66.9 OBESITY (BMI 30-39.9): ICD-10-CM

## 2019-10-10 DIAGNOSIS — G43.009 MIGRAINE WITHOUT AURA AND WITHOUT STATUS MIGRAINOSUS, NOT INTRACTABLE: ICD-10-CM

## 2019-10-10 DIAGNOSIS — M15.9 GENERALIZED OA: ICD-10-CM

## 2019-10-10 DIAGNOSIS — R10.31 RIGHT LOWER QUADRANT ABDOMINAL PAIN: ICD-10-CM

## 2019-10-10 DIAGNOSIS — I10 ESSENTIAL HYPERTENSION: Primary | ICD-10-CM

## 2019-10-10 DIAGNOSIS — N30.00 ACUTE CYSTITIS WITHOUT HEMATURIA: ICD-10-CM

## 2019-10-10 DIAGNOSIS — Z12.11 COLON CANCER SCREENING: ICD-10-CM

## 2019-10-10 LAB
BILIRUBIN, POC: NEGATIVE
BLOOD URINE, POC: 25
CLARITY, POC: CLEAR
COLOR, POC: ABNORMAL
CONTROL: ABNORMAL
GLUCOSE URINE, POC: NEGATIVE
HEMOCCULT STL QL: POSITIVE
KETONES, POC: NEGATIVE
LEUKOCYTE EST, POC: NEGATIVE
NITRITE, POC: NEGATIVE
PH, POC: ABNORMAL
PROTEIN, POC: NEGATIVE
SPECIFIC GRAVITY, POC: 1
UROBILINOGEN, POC: 0.2

## 2019-10-10 PROCEDURE — 1090F PRES/ABSN URINE INCON ASSESS: CPT | Performed by: FAMILY MEDICINE

## 2019-10-10 PROCEDURE — G8484 FLU IMMUNIZE NO ADMIN: HCPCS | Performed by: FAMILY MEDICINE

## 2019-10-10 PROCEDURE — 1123F ACP DISCUSS/DSCN MKR DOCD: CPT | Performed by: FAMILY MEDICINE

## 2019-10-10 PROCEDURE — 3023F SPIROM DOC REV: CPT | Performed by: FAMILY MEDICINE

## 2019-10-10 PROCEDURE — 1036F TOBACCO NON-USER: CPT | Performed by: FAMILY MEDICINE

## 2019-10-10 PROCEDURE — 82274 ASSAY TEST FOR BLOOD FECAL: CPT | Performed by: FAMILY MEDICINE

## 2019-10-10 PROCEDURE — 81002 URINALYSIS NONAUTO W/O SCOPE: CPT | Performed by: FAMILY MEDICINE

## 2019-10-10 PROCEDURE — G8400 PT W/DXA NO RESULTS DOC: HCPCS | Performed by: FAMILY MEDICINE

## 2019-10-10 PROCEDURE — 4040F PNEUMOC VAC/ADMIN/RCVD: CPT | Performed by: FAMILY MEDICINE

## 2019-10-10 PROCEDURE — G8427 DOCREV CUR MEDS BY ELIG CLIN: HCPCS | Performed by: FAMILY MEDICINE

## 2019-10-10 PROCEDURE — G8926 SPIRO NO PERF OR DOC: HCPCS | Performed by: FAMILY MEDICINE

## 2019-10-10 PROCEDURE — 99213 OFFICE O/P EST LOW 20 MIN: CPT | Performed by: FAMILY MEDICINE

## 2019-10-10 PROCEDURE — G8417 CALC BMI ABV UP PARAM F/U: HCPCS | Performed by: FAMILY MEDICINE

## 2019-10-10 PROCEDURE — 3017F COLORECTAL CA SCREEN DOC REV: CPT | Performed by: FAMILY MEDICINE

## 2019-10-10 RX ORDER — NITROFURANTOIN 25; 75 MG/1; MG/1
100 CAPSULE ORAL 2 TIMES DAILY
Qty: 20 CAPSULE | Refills: 0 | Status: SHIPPED | OUTPATIENT
Start: 2019-10-10 | End: 2019-10-20

## 2019-10-10 RX ORDER — CLONAZEPAM 0.5 MG/1
0.5 TABLET ORAL 2 TIMES DAILY PRN
Qty: 60 TABLET | Refills: 0 | Status: ON HOLD | OUTPATIENT
Start: 2019-10-10 | End: 2019-10-31

## 2019-10-10 ASSESSMENT — ENCOUNTER SYMPTOMS
CHEST TIGHTNESS: 0
DIARRHEA: 0
ABDOMINAL DISTENTION: 0
FACIAL SWELLING: 0
SORE THROAT: 0
EYE DISCHARGE: 0
ABDOMINAL PAIN: 1
PHOTOPHOBIA: 0
COLOR CHANGE: 0
ANAL BLEEDING: 0
TROUBLE SWALLOWING: 0
WHEEZING: 0
EYE PAIN: 0
SINUS PRESSURE: 0
APNEA: 0
VOMITING: 0
CONSTIPATION: 0
BACK PAIN: 0
NAUSEA: 0
SHORTNESS OF BREATH: 0

## 2019-10-16 ENCOUNTER — OFFICE VISIT (OUTPATIENT)
Dept: FAMILY MEDICINE CLINIC | Age: 74
End: 2019-10-16
Payer: MEDICARE

## 2019-10-16 VITALS
SYSTOLIC BLOOD PRESSURE: 136 MMHG | BODY MASS INDEX: 36.06 KG/M2 | DIASTOLIC BLOOD PRESSURE: 71 MMHG | HEART RATE: 71 BPM | HEIGHT: 61 IN | OXYGEN SATURATION: 94 % | WEIGHT: 191 LBS

## 2019-10-16 DIAGNOSIS — Z00.00 ROUTINE GENERAL MEDICAL EXAMINATION AT A HEALTH CARE FACILITY: ICD-10-CM

## 2019-10-16 DIAGNOSIS — Z00.00 MEDICARE ANNUAL WELLNESS VISIT, INITIAL: Primary | ICD-10-CM

## 2019-10-16 PROCEDURE — 4040F PNEUMOC VAC/ADMIN/RCVD: CPT | Performed by: FAMILY MEDICINE

## 2019-10-16 PROCEDURE — 3017F COLORECTAL CA SCREEN DOC REV: CPT | Performed by: FAMILY MEDICINE

## 2019-10-16 PROCEDURE — G8484 FLU IMMUNIZE NO ADMIN: HCPCS | Performed by: FAMILY MEDICINE

## 2019-10-16 PROCEDURE — 1123F ACP DISCUSS/DSCN MKR DOCD: CPT | Performed by: FAMILY MEDICINE

## 2019-10-16 PROCEDURE — G0438 PPPS, INITIAL VISIT: HCPCS | Performed by: FAMILY MEDICINE

## 2019-10-16 RX ORDER — BUPROPION HYDROCHLORIDE 300 MG/1
300 TABLET ORAL EVERY MORNING
Qty: 30 TABLET | Refills: 3 | Status: ON HOLD | OUTPATIENT
Start: 2019-10-16 | End: 2019-10-31

## 2019-10-16 ASSESSMENT — ENCOUNTER SYMPTOMS
FACIAL SWELLING: 0
SINUS PRESSURE: 0
NAUSEA: 0
VOMITING: 0
TROUBLE SWALLOWING: 0
ABDOMINAL DISTENTION: 0
CHEST TIGHTNESS: 0
ABDOMINAL PAIN: 0
BACK PAIN: 1
COLOR CHANGE: 0
APNEA: 0
CONSTIPATION: 0
EYE DISCHARGE: 0
SHORTNESS OF BREATH: 0
SORE THROAT: 0
WHEEZING: 0
PHOTOPHOBIA: 0
DIARRHEA: 0
ANAL BLEEDING: 0
EYE PAIN: 0

## 2019-10-16 ASSESSMENT — PATIENT HEALTH QUESTIONNAIRE - PHQ9
SUM OF ALL RESPONSES TO PHQ QUESTIONS 1-9: 2
SUM OF ALL RESPONSES TO PHQ QUESTIONS 1-9: 2

## 2019-10-16 ASSESSMENT — LIFESTYLE VARIABLES
HOW OFTEN DURING THE LAST YEAR HAVE YOU BEEN UNABLE TO REMEMBER WHAT HAPPENED THE NIGHT BEFORE BECAUSE YOU HAD BEEN DRINKING: 0
HOW OFTEN DURING THE LAST YEAR HAVE YOU FAILED TO DO WHAT WAS NORMALLY EXPECTED FROM YOU BECAUSE OF DRINKING: 0
AUDIT TOTAL SCORE: 2
HOW OFTEN DO YOU HAVE SIX OR MORE DRINKS ON ONE OCCASION: 0
HAS A RELATIVE, FRIEND, DOCTOR, OR ANOTHER HEALTH PROFESSIONAL EXPRESSED CONCERN ABOUT YOUR DRINKING OR SUGGESTED YOU CUT DOWN: 0
HOW OFTEN DURING THE LAST YEAR HAVE YOU FOUND THAT YOU WERE NOT ABLE TO STOP DRINKING ONCE YOU HAD STARTED: 0
HAVE YOU OR SOMEONE ELSE BEEN INJURED AS A RESULT OF YOUR DRINKING: 0
HOW MANY STANDARD DRINKS CONTAINING ALCOHOL DO YOU HAVE ON A TYPICAL DAY: 0
AUDIT-C TOTAL SCORE: 2
HOW OFTEN DURING THE LAST YEAR HAVE YOU NEEDED AN ALCOHOLIC DRINK FIRST THING IN THE MORNING TO GET YOURSELF GOING AFTER A NIGHT OF HEAVY DRINKING: 0
HOW OFTEN DO YOU HAVE A DRINK CONTAINING ALCOHOL: 2
HOW OFTEN DURING THE LAST YEAR HAVE YOU HAD A FEELING OF GUILT OR REMORSE AFTER DRINKING: 0

## 2019-10-18 ENCOUNTER — TELEPHONE (OUTPATIENT)
Dept: GASTROENTEROLOGY | Age: 74
End: 2019-10-18

## 2019-10-18 DIAGNOSIS — Z12.11 SCREENING FOR COLON CANCER: Primary | ICD-10-CM

## 2019-10-18 RX ORDER — SODIUM, POTASSIUM,MAG SULFATES 17.5-3.13G
SOLUTION, RECONSTITUTED, ORAL ORAL
Qty: 1 BOTTLE | Refills: 0 | Status: ON HOLD | OUTPATIENT
Start: 2019-10-18 | End: 2020-11-12

## 2019-10-23 ENCOUNTER — TELEPHONE (OUTPATIENT)
Dept: FAMILY MEDICINE CLINIC | Age: 74
End: 2019-10-23

## 2019-10-23 RX ORDER — AZITHROMYCIN 500 MG/1
500 TABLET, FILM COATED ORAL DAILY
Qty: 3 TABLET | Refills: 0 | Status: SHIPPED | OUTPATIENT
Start: 2019-10-23 | End: 2019-10-26

## 2019-10-25 ENCOUNTER — TELEPHONE (OUTPATIENT)
Dept: GASTROENTEROLOGY | Age: 74
End: 2019-10-25

## 2019-10-30 ENCOUNTER — ANESTHESIA EVENT (OUTPATIENT)
Dept: OPERATING ROOM | Age: 74
End: 2019-10-30
Payer: MEDICARE

## 2019-10-31 ENCOUNTER — HOSPITAL ENCOUNTER (OUTPATIENT)
Age: 74
Setting detail: OUTPATIENT SURGERY
Discharge: HOME | End: 2019-10-31
Attending: INTERNAL MEDICINE | Admitting: INTERNAL MEDICINE
Payer: MEDICARE

## 2019-10-31 ENCOUNTER — ANESTHESIA (OUTPATIENT)
Dept: OPERATING ROOM | Age: 74
End: 2019-10-31
Payer: MEDICARE

## 2019-10-31 VITALS
RESPIRATION RATE: 19 BRPM | WEIGHT: 180.7 LBS | SYSTOLIC BLOOD PRESSURE: 130 MMHG | OXYGEN SATURATION: 99 % | HEART RATE: 63 BPM | TEMPERATURE: 97.9 F | BODY MASS INDEX: 33.25 KG/M2 | DIASTOLIC BLOOD PRESSURE: 65 MMHG | HEIGHT: 62 IN

## 2019-10-31 VITALS
SYSTOLIC BLOOD PRESSURE: 124 MMHG | OXYGEN SATURATION: 99 % | DIASTOLIC BLOOD PRESSURE: 64 MMHG | RESPIRATION RATE: 17 BRPM

## 2019-10-31 PROCEDURE — 7100000011 HC PHASE II RECOVERY - ADDTL 15 MIN: Performed by: INTERNAL MEDICINE

## 2019-10-31 PROCEDURE — 2580000003 HC RX 258: Performed by: NURSE ANESTHETIST, CERTIFIED REGISTERED

## 2019-10-31 PROCEDURE — 3700000000 HC ANESTHESIA ATTENDED CARE: Performed by: INTERNAL MEDICINE

## 2019-10-31 PROCEDURE — 88305 TISSUE EXAM BY PATHOLOGIST: CPT

## 2019-10-31 PROCEDURE — 6360000002 HC RX W HCPCS: Performed by: NURSE ANESTHETIST, CERTIFIED REGISTERED

## 2019-10-31 PROCEDURE — 2709999900 HC NON-CHARGEABLE SUPPLY: Performed by: INTERNAL MEDICINE

## 2019-10-31 PROCEDURE — 45380 COLONOSCOPY AND BIOPSY: CPT | Performed by: INTERNAL MEDICINE

## 2019-10-31 PROCEDURE — 3609010300 HC COLONOSCOPY W/BIOPSY SINGLE/MULTIPLE: Performed by: INTERNAL MEDICINE

## 2019-10-31 PROCEDURE — 3700000001 HC ADD 15 MINUTES (ANESTHESIA): Performed by: INTERNAL MEDICINE

## 2019-10-31 PROCEDURE — 2580000003 HC RX 258: Performed by: ANESTHESIOLOGY

## 2019-10-31 PROCEDURE — 2500000003 HC RX 250 WO HCPCS: Performed by: NURSE ANESTHETIST, CERTIFIED REGISTERED

## 2019-10-31 PROCEDURE — 7100000010 HC PHASE II RECOVERY - FIRST 15 MIN: Performed by: INTERNAL MEDICINE

## 2019-10-31 RX ORDER — SODIUM CHLORIDE 0.9 % (FLUSH) 0.9 %
10 SYRINGE (ML) INJECTION EVERY 12 HOURS SCHEDULED
Status: DISCONTINUED | OUTPATIENT
Start: 2019-10-31 | End: 2019-10-31 | Stop reason: HOSPADM

## 2019-10-31 RX ORDER — SODIUM CHLORIDE 0.9 % (FLUSH) 0.9 %
10 SYRINGE (ML) INJECTION PRN
Status: DISCONTINUED | OUTPATIENT
Start: 2019-10-31 | End: 2019-10-31 | Stop reason: HOSPADM

## 2019-10-31 RX ORDER — ONDANSETRON 2 MG/ML
4 INJECTION INTRAMUSCULAR; INTRAVENOUS
Status: DISCONTINUED | OUTPATIENT
Start: 2019-10-31 | End: 2019-10-31 | Stop reason: HOSPADM

## 2019-10-31 RX ORDER — LIDOCAINE HYDROCHLORIDE 20 MG/ML
INJECTION, SOLUTION INFILTRATION; PERINEURAL PRN
Status: DISCONTINUED | OUTPATIENT
Start: 2019-10-31 | End: 2019-10-31 | Stop reason: SDUPTHER

## 2019-10-31 RX ORDER — SODIUM CHLORIDE, SODIUM LACTATE, POTASSIUM CHLORIDE, CALCIUM CHLORIDE 600; 310; 30; 20 MG/100ML; MG/100ML; MG/100ML; MG/100ML
INJECTION, SOLUTION INTRAVENOUS CONTINUOUS PRN
Status: DISCONTINUED | OUTPATIENT
Start: 2019-10-31 | End: 2019-10-31 | Stop reason: SDUPTHER

## 2019-10-31 RX ORDER — SODIUM CHLORIDE 9 MG/ML
INJECTION, SOLUTION INTRAVENOUS CONTINUOUS
Status: DISCONTINUED | OUTPATIENT
Start: 2019-11-01 | End: 2019-10-31 | Stop reason: HOSPADM

## 2019-10-31 RX ORDER — SODIUM CHLORIDE, SODIUM LACTATE, POTASSIUM CHLORIDE, CALCIUM CHLORIDE 600; 310; 30; 20 MG/100ML; MG/100ML; MG/100ML; MG/100ML
INJECTION, SOLUTION INTRAVENOUS CONTINUOUS
Status: DISCONTINUED | OUTPATIENT
Start: 2019-11-01 | End: 2019-10-31 | Stop reason: HOSPADM

## 2019-10-31 RX ORDER — PROPOFOL 10 MG/ML
INJECTION, EMULSION INTRAVENOUS PRN
Status: DISCONTINUED | OUTPATIENT
Start: 2019-10-31 | End: 2019-10-31 | Stop reason: SDUPTHER

## 2019-10-31 RX ORDER — LIDOCAINE HYDROCHLORIDE 10 MG/ML
1 INJECTION, SOLUTION EPIDURAL; INFILTRATION; INTRACAUDAL; PERINEURAL
Status: DISCONTINUED | OUTPATIENT
Start: 2019-11-01 | End: 2019-10-31 | Stop reason: HOSPADM

## 2019-10-31 RX ADMIN — PROPOFOL 60 MG: 10 INJECTION, EMULSION INTRAVENOUS at 11:05

## 2019-10-31 RX ADMIN — PROPOFOL 60 MG: 10 INJECTION, EMULSION INTRAVENOUS at 11:09

## 2019-10-31 RX ADMIN — PROPOFOL 60 MG: 10 INJECTION, EMULSION INTRAVENOUS at 11:18

## 2019-10-31 RX ADMIN — SODIUM CHLORIDE, POTASSIUM CHLORIDE, SODIUM LACTATE AND CALCIUM CHLORIDE: 600; 310; 30; 20 INJECTION, SOLUTION INTRAVENOUS at 10:32

## 2019-10-31 RX ADMIN — PROPOFOL 60 MG: 10 INJECTION, EMULSION INTRAVENOUS at 11:12

## 2019-10-31 RX ADMIN — LIDOCAINE HYDROCHLORIDE 60 MG: 20 INJECTION, SOLUTION INFILTRATION; PERINEURAL at 11:05

## 2019-10-31 RX ADMIN — SODIUM CHLORIDE, POTASSIUM CHLORIDE, SODIUM LACTATE AND CALCIUM CHLORIDE: 600; 310; 30; 20 INJECTION, SOLUTION INTRAVENOUS at 11:02

## 2019-10-31 ASSESSMENT — PULMONARY FUNCTION TESTS
PIF_VALUE: 1

## 2019-10-31 ASSESSMENT — PAIN - FUNCTIONAL ASSESSMENT: PAIN_FUNCTIONAL_ASSESSMENT: 0-10

## 2019-10-31 ASSESSMENT — PAIN SCALES - GENERAL
PAINLEVEL_OUTOF10: 0

## 2019-10-31 ASSESSMENT — ENCOUNTER SYMPTOMS: SHORTNESS OF BREATH: 0

## 2019-11-01 LAB — SURGICAL PATHOLOGY REPORT: NORMAL

## 2019-11-08 ENCOUNTER — TELEPHONE (OUTPATIENT)
Dept: FAMILY MEDICINE CLINIC | Age: 74
End: 2019-11-08

## 2019-11-08 RX ORDER — AZITHROMYCIN 500 MG/1
500 TABLET, FILM COATED ORAL DAILY
Qty: 1 PACKET | Refills: 0 | Status: SHIPPED | OUTPATIENT
Start: 2019-11-08 | End: 2019-11-13

## 2019-11-15 ENCOUNTER — OFFICE VISIT (OUTPATIENT)
Dept: FAMILY MEDICINE CLINIC | Age: 74
End: 2019-11-15
Payer: MEDICARE

## 2019-11-15 VITALS
BODY MASS INDEX: 33.91 KG/M2 | DIASTOLIC BLOOD PRESSURE: 85 MMHG | SYSTOLIC BLOOD PRESSURE: 162 MMHG | RESPIRATION RATE: 16 BRPM | HEART RATE: 65 BPM | WEIGHT: 185.4 LBS | OXYGEN SATURATION: 95 %

## 2019-11-15 DIAGNOSIS — I10 ESSENTIAL HYPERTENSION: ICD-10-CM

## 2019-11-15 DIAGNOSIS — J01.90 ACUTE SINUSITIS, RECURRENCE NOT SPECIFIED, UNSPECIFIED LOCATION: Primary | ICD-10-CM

## 2019-11-15 LAB
INFLUENZA A ANTIBODY: NEGATIVE
INFLUENZA B ANTIBODY: NEGATIVE
S PYO AG THROAT QL: NORMAL

## 2019-11-15 PROCEDURE — G8400 PT W/DXA NO RESULTS DOC: HCPCS | Performed by: FAMILY MEDICINE

## 2019-11-15 PROCEDURE — 87804 INFLUENZA ASSAY W/OPTIC: CPT | Performed by: FAMILY MEDICINE

## 2019-11-15 PROCEDURE — G8482 FLU IMMUNIZE ORDER/ADMIN: HCPCS | Performed by: FAMILY MEDICINE

## 2019-11-15 PROCEDURE — G8417 CALC BMI ABV UP PARAM F/U: HCPCS | Performed by: FAMILY MEDICINE

## 2019-11-15 PROCEDURE — G8427 DOCREV CUR MEDS BY ELIG CLIN: HCPCS | Performed by: FAMILY MEDICINE

## 2019-11-15 PROCEDURE — 87880 STREP A ASSAY W/OPTIC: CPT | Performed by: FAMILY MEDICINE

## 2019-11-15 PROCEDURE — 3017F COLORECTAL CA SCREEN DOC REV: CPT | Performed by: FAMILY MEDICINE

## 2019-11-15 PROCEDURE — 4040F PNEUMOC VAC/ADMIN/RCVD: CPT | Performed by: FAMILY MEDICINE

## 2019-11-15 PROCEDURE — 99213 OFFICE O/P EST LOW 20 MIN: CPT | Performed by: FAMILY MEDICINE

## 2019-11-15 PROCEDURE — 1090F PRES/ABSN URINE INCON ASSESS: CPT | Performed by: FAMILY MEDICINE

## 2019-11-15 PROCEDURE — 1036F TOBACCO NON-USER: CPT | Performed by: FAMILY MEDICINE

## 2019-11-15 PROCEDURE — 1123F ACP DISCUSS/DSCN MKR DOCD: CPT | Performed by: FAMILY MEDICINE

## 2019-11-15 RX ORDER — FLUTICASONE PROPIONATE 50 MCG
2 SPRAY, SUSPENSION (ML) NASAL DAILY
Qty: 1 BOTTLE | Refills: 0 | Status: SHIPPED | OUTPATIENT
Start: 2019-11-15 | End: 2021-01-17

## 2019-11-15 RX ORDER — DOXYCYCLINE HYCLATE 100 MG
100 TABLET ORAL 2 TIMES DAILY
Qty: 20 TABLET | Refills: 0 | Status: SHIPPED | OUTPATIENT
Start: 2019-11-15 | End: 2019-11-25

## 2019-11-15 ASSESSMENT — ENCOUNTER SYMPTOMS
SINUS PRESSURE: 1
COUGH: 1
SORE THROAT: 1
SINUS COMPLAINT: 1
SHORTNESS OF BREATH: 0
WHEEZING: 0

## 2019-12-30 ENCOUNTER — TELEPHONE (OUTPATIENT)
Dept: OTHER | Age: 74
End: 2019-12-30

## 2019-12-30 RX ORDER — AZITHROMYCIN 500 MG/1
500 TABLET, FILM COATED ORAL DAILY
Qty: 1 PACKET | Refills: 0 | Status: SHIPPED | OUTPATIENT
Start: 2019-12-30 | End: 2020-01-04

## 2019-12-30 NOTE — TELEPHONE ENCOUNTER
Pt is having sinus pressure, head ache, sore throat, cough and dark green phlem. She is asking if you can send a Zpack or Penicillin to [facility] on [address]     Last appt 11/15/19  Next appt  N/a    Please call Pt   Thank  You

## 2020-02-18 ENCOUNTER — OFFICE VISIT (OUTPATIENT)
Dept: FAMILY MEDICINE CLINIC | Age: 75
End: 2020-02-18
Payer: MEDICARE

## 2020-02-18 VITALS
BODY MASS INDEX: 34.6 KG/M2 | SYSTOLIC BLOOD PRESSURE: 140 MMHG | OXYGEN SATURATION: 95 % | DIASTOLIC BLOOD PRESSURE: 79 MMHG | WEIGHT: 188 LBS | HEIGHT: 62 IN | HEART RATE: 75 BPM

## 2020-02-18 PROCEDURE — 1090F PRES/ABSN URINE INCON ASSESS: CPT | Performed by: FAMILY MEDICINE

## 2020-02-18 PROCEDURE — 3023F SPIROM DOC REV: CPT | Performed by: FAMILY MEDICINE

## 2020-02-18 PROCEDURE — G8482 FLU IMMUNIZE ORDER/ADMIN: HCPCS | Performed by: FAMILY MEDICINE

## 2020-02-18 PROCEDURE — G8417 CALC BMI ABV UP PARAM F/U: HCPCS | Performed by: FAMILY MEDICINE

## 2020-02-18 PROCEDURE — 1123F ACP DISCUSS/DSCN MKR DOCD: CPT | Performed by: FAMILY MEDICINE

## 2020-02-18 PROCEDURE — 1036F TOBACCO NON-USER: CPT | Performed by: FAMILY MEDICINE

## 2020-02-18 PROCEDURE — G8428 CUR MEDS NOT DOCUMENT: HCPCS | Performed by: FAMILY MEDICINE

## 2020-02-18 PROCEDURE — G8400 PT W/DXA NO RESULTS DOC: HCPCS | Performed by: FAMILY MEDICINE

## 2020-02-18 PROCEDURE — 3017F COLORECTAL CA SCREEN DOC REV: CPT | Performed by: FAMILY MEDICINE

## 2020-02-18 PROCEDURE — 4040F PNEUMOC VAC/ADMIN/RCVD: CPT | Performed by: FAMILY MEDICINE

## 2020-02-18 PROCEDURE — G8926 SPIRO NO PERF OR DOC: HCPCS | Performed by: FAMILY MEDICINE

## 2020-02-18 PROCEDURE — 99214 OFFICE O/P EST MOD 30 MIN: CPT | Performed by: FAMILY MEDICINE

## 2020-02-18 RX ORDER — PREDNISONE 10 MG/1
TABLET ORAL
Qty: 10 TABLET | Refills: 0 | Status: SHIPPED | OUTPATIENT
Start: 2020-02-18 | End: 2020-03-23 | Stop reason: ALTCHOICE

## 2020-02-18 RX ORDER — AZITHROMYCIN 500 MG/1
500 TABLET, FILM COATED ORAL DAILY
Qty: 5 TABLET | Refills: 0 | Status: SHIPPED | OUTPATIENT
Start: 2020-02-18 | End: 2020-02-23

## 2020-02-18 ASSESSMENT — ENCOUNTER SYMPTOMS
DIARRHEA: 0
FACIAL SWELLING: 0
WHEEZING: 1
APNEA: 0
COUGH: 1
COLOR CHANGE: 0
ANAL BLEEDING: 0
ABDOMINAL DISTENTION: 0
BACK PAIN: 0
EYE DISCHARGE: 0
NAUSEA: 0
SORE THROAT: 0
VOMITING: 0
ABDOMINAL PAIN: 0
SHORTNESS OF BREATH: 1
PHOTOPHOBIA: 0
TROUBLE SWALLOWING: 0
EYE PAIN: 0
CHEST TIGHTNESS: 0
CONSTIPATION: 0
SINUS PRESSURE: 0

## 2020-02-19 NOTE — PROGRESS NOTES
Kidney stone 2012 & 2013    X 2-SEVERAL STONES EACH TIME, PASSED ON OWN    Pneumonia     Recurrent UTI     Sleep apnea     HAS MACHINE BUT DOES NOT USE    Vision abnormalities     RT DISTORTED, LT DECREASED    Wears glasses       Past Surgical History:   Procedure Laterality Date    APPENDECTOMY  1963     SECTION, CLASSIC  1964, 0, & 6229    COLONOSCOPY      COLONOSCOPY N/A 10/31/2019    COLONOSCOPY WITH BIOPSY performed by David Trujillo MD at [address] VITRECTOMY Right 2014       Family History   Problem Relation Age of Onset    Cancer Maternal Grandmother         UNKNOWN       Social History     Tobacco Use    Smoking status: Former Smoker     Last attempt to quit: 10/31/1994     Years since quittin.3    Smokeless tobacco: Never Used   Substance Use Topics    Alcohol use: Yes     Comment: 2 DRINKS A WEEK      Current Outpatient Medications   Medication Sig Dispense Refill    predniSONE (DELTASONE) 10 MG tablet 10 mg for 10 days 10 tablet 0    azithromycin (ZITHROMAX) 500 MG tablet Take 1 tablet by mouth daily for 5 days 5 tablet 0    fluticasone (FLONASE) 50 MCG/ACT nasal spray 2 sprays by Nasal route daily for 10 days 1 Bottle 0    Na Sulfate-K Sulfate-Mg Sulf 17.5-3.13-1.6 GM/177ML SOLN Use as directed in your patient instructions. 1 Bottle 0    buPROPion (WELLBUTRIN XL) 150 MG extended release tablet Take 1 tablet by mouth every morning 30 tablet 2    omeprazole (PRILOSEC) 20 MG delayed release capsule Take 1 capsule by mouth 2 times daily (before meals) 58 capsule 0     No current facility-administered medications for this visit.       Allergies   Allergen Reactions    Cefdinir Itching    Morphine Nausea And Vomiting    Avelox [Moxifloxacin]      UNSURE OF REACTION      Ciprofloxacin Itching    Quinolones      UNSURE OF REACTION      Statins Other (See Comments)     MUSCLE WEAKNESS      Sulfa

## 2020-03-13 ENCOUNTER — TELEPHONE (OUTPATIENT)
Dept: FAMILY MEDICINE CLINIC | Age: 75
End: 2020-03-13

## 2020-03-19 RX ORDER — AZITHROMYCIN 250 MG/1
TABLET, FILM COATED ORAL
Qty: 6 TABLET | Refills: 0 | Status: SHIPPED | OUTPATIENT
Start: 2020-03-19 | End: 2020-03-29

## 2020-03-19 RX ORDER — CLONAZEPAM 0.5 MG/1
0.5 TABLET ORAL 2 TIMES DAILY
Qty: 30 TABLET | Refills: 0 | Status: SHIPPED | OUTPATIENT
Start: 2020-03-19 | End: 2020-05-05 | Stop reason: SDUPTHER

## 2020-03-19 RX ORDER — CLONAZEPAM 0.5 MG/1
0.5 TABLET ORAL 2 TIMES DAILY
Qty: 60 TABLET | Refills: 0 | Status: SHIPPED | OUTPATIENT
Start: 2020-03-19 | End: 2020-06-12 | Stop reason: SDUPTHER

## 2020-03-19 NOTE — TELEPHONE ENCOUNTER
Patient called and stated she needs a refill on Clonazpine 0.5 mg tab 1 tab by mouth 2 daily PRN. Patient also states she has a sinus infection and asked for Sinus infection. Send refills to [facility] in [address] [place], [place] [address].

## 2020-03-23 ENCOUNTER — TELEPHONE (OUTPATIENT)
Dept: FAMILY MEDICINE CLINIC | Age: 75
End: 2020-03-23

## 2020-03-23 ENCOUNTER — OFFICE VISIT (OUTPATIENT)
Dept: PRIMARY CARE CLINIC | Age: 75
End: 2020-03-23
Payer: MEDICARE

## 2020-03-23 VITALS
TEMPERATURE: 98.1 F | RESPIRATION RATE: 20 BRPM | WEIGHT: 190 LBS | BODY MASS INDEX: 34.96 KG/M2 | HEART RATE: 77 BPM | OXYGEN SATURATION: 98 % | HEIGHT: 62 IN | SYSTOLIC BLOOD PRESSURE: 124 MMHG | DIASTOLIC BLOOD PRESSURE: 73 MMHG

## 2020-03-23 LAB
INFLUENZA A ANTIBODY: NEGATIVE
INFLUENZA B ANTIBODY: NEGATIVE
S PYO AG THROAT QL: NORMAL

## 2020-03-23 PROCEDURE — G8417 CALC BMI ABV UP PARAM F/U: HCPCS | Performed by: NURSE PRACTITIONER

## 2020-03-23 PROCEDURE — 87880 STREP A ASSAY W/OPTIC: CPT | Performed by: NURSE PRACTITIONER

## 2020-03-23 PROCEDURE — 1036F TOBACCO NON-USER: CPT | Performed by: NURSE PRACTITIONER

## 2020-03-23 PROCEDURE — G8400 PT W/DXA NO RESULTS DOC: HCPCS | Performed by: NURSE PRACTITIONER

## 2020-03-23 PROCEDURE — 1123F ACP DISCUSS/DSCN MKR DOCD: CPT | Performed by: NURSE PRACTITIONER

## 2020-03-23 PROCEDURE — 99214 OFFICE O/P EST MOD 30 MIN: CPT | Performed by: NURSE PRACTITIONER

## 2020-03-23 PROCEDURE — 3017F COLORECTAL CA SCREEN DOC REV: CPT | Performed by: NURSE PRACTITIONER

## 2020-03-23 PROCEDURE — 87804 INFLUENZA ASSAY W/OPTIC: CPT | Performed by: NURSE PRACTITIONER

## 2020-03-23 PROCEDURE — G8482 FLU IMMUNIZE ORDER/ADMIN: HCPCS | Performed by: NURSE PRACTITIONER

## 2020-03-23 PROCEDURE — G8926 SPIRO NO PERF OR DOC: HCPCS | Performed by: NURSE PRACTITIONER

## 2020-03-23 PROCEDURE — 3023F SPIROM DOC REV: CPT | Performed by: NURSE PRACTITIONER

## 2020-03-23 PROCEDURE — G8427 DOCREV CUR MEDS BY ELIG CLIN: HCPCS | Performed by: NURSE PRACTITIONER

## 2020-03-23 PROCEDURE — 4040F PNEUMOC VAC/ADMIN/RCVD: CPT | Performed by: NURSE PRACTITIONER

## 2020-03-23 PROCEDURE — 1090F PRES/ABSN URINE INCON ASSESS: CPT | Performed by: NURSE PRACTITIONER

## 2020-03-23 RX ORDER — DOXYCYCLINE HYCLATE 100 MG
100 TABLET ORAL 2 TIMES DAILY
Qty: 20 TABLET | Refills: 0 | Status: ON HOLD | OUTPATIENT
Start: 2020-03-23 | End: 2020-11-12

## 2020-03-23 RX ORDER — PREDNISONE 20 MG/1
20 TABLET ORAL DAILY
Qty: 15 TABLET | Refills: 0 | Status: ON HOLD | OUTPATIENT
Start: 2020-03-23 | End: 2020-11-12

## 2020-03-23 ASSESSMENT — ENCOUNTER SYMPTOMS
COUGH: 1
CHEST TIGHTNESS: 1
VOICE CHANGE: 0
ABDOMINAL PAIN: 0
RHINORRHEA: 0
NAUSEA: 0
SHORTNESS OF BREATH: 1
TROUBLE SWALLOWING: 0
SORE THROAT: 1
WHEEZING: 1
VOMITING: 0
EYE PAIN: 0

## 2020-03-23 NOTE — PATIENT INSTRUCTIONS
Patient Education        Chronic Obstructive Pulmonary Disease (COPD) Flare-Ups: Care Instructions  Your Care Instructions    Chronic obstructive pulmonary disease (COPD) is a lung disease that makes it hard to breathe. It is caused by damage to the lungs over many years, usually from smoking. COPD is often a mix of two diseases:  · Chronic bronchitis: The airways that carry air to the lungs (bronchial tubes) get inflamed and make a lot of mucus. This can narrow or block the airways. · Emphysema: In a healthy person, the tiny air sacs in the lungs are like balloons. As you breathe in and out, they get bigger and smaller to move air through your lungs. But with emphysema, these air sacs are damaged and lose their stretch. Less air gets in and out of the lungs. Many people with COPD have attacks called flare-ups or exacerbations. This is when your usual symptoms quickly get worse and stay worse. The doctor has checked you carefully. But problems can develop later. If you notice any problems or new symptoms, get medical treatment right away. Follow-up care is a key part of your treatment and safety. Be sure to make and go to all appointments, and call your doctor if you are having problems. It's also a good idea to know your test results and keep a list of the medicines you take. How can you care for yourself at home? · Be safe with medicines. Take your medicines exactly as prescribed. Call your doctor if you think you are having a problem with your medicine. You may be taking medicines such as:  ? Bronchodilators. These help open your airways and make breathing easier. ? Corticosteroids. These reduce airway inflammation. They may be given as pills, in a vein, or in an inhaled form. You may go home with pills in addition to an inhaler that you already use. · A spacer may help you get more inhaled medicine to your lungs. Ask your doctor or pharmacist if a spacer is right for you.  If it is, ask how to use it information go to [employer]    Prevention steps for People with confirmed or suspected COVID-19 (including persons under investigation) who do not need to be hospitalized  and   People with confirmed COVID-19 who were hospitalized and determined to be medically stable to go home    Your healthcare provider and public health staff will evaluate whether you can be cared for at home. If it is determined that you do not need to be hospitalized and can be isolated at home, you will be monitored by staff from your local or state health department. You should follow the prevention steps below until a healthcare provider or local or state health department says you can return to your normal activities. Stay home except to get medical care  People who are mildly ill with COVID-19 are able to isolate at home during their illness. You should restrict activities outside your home, except for getting medical care. Do not go to work, school, or public areas. Avoid using public transportation, ride-sharing, or taxis. Separate yourself from other people and animals in your home  People: As much as possible, you should stay in a specific room and away from other people in your home. Also, you should use a separate bathroom, if available. Animals: You should restrict contact with pets and other animals while you are sick with COVID-19, just like you would around other people. Although there have not been reports of pets or other animals becoming sick with COVID-19, it is still recommended that people sick with COVID-19 limit contact with animals until more information is known about the virus. When possible, have another member of your household care for your animals while you are sick. If you are sick with COVID-19, avoid contact with your pet, including petting, snuggling, being kissed or licked, and sharing food.  If you must care for your pet or be around animals while you are sick, wash your hands before and after you interact with pets and wear a facemask. Call ahead before visiting your doctor  If you have a medical appointment, call the healthcare provider and tell them that you have or may have COVID-19. This will help the healthcare providers office take steps to keep other people from getting infected or exposed. Wear a facemask  You should wear a facemask when you are around other people (e.g., sharing a room or vehicle) or pets and before you enter a healthcare providers office. If you are not able to wear a facemask (for example, because it causes trouble breathing), then people who live with you should not stay in the same room with you, or they should wear a facemask if they enter your room. Cover your coughs and sneezes  Cover your mouth and nose with a tissue when you cough or sneeze. Throw used tissues in a lined trash can. Immediately wash your hands with soap and water for at least 20 seconds or, if soap and water are not available, clean your hands with an alcohol-based hand  that contains at least 60% alcohol. Clean your hands often  Wash your hands often with soap and water for at least 20 seconds, especially after blowing your nose, coughing, or sneezing; going to the bathroom; and before eating or preparing food. If soap and water are not readily available, use an alcohol-based hand  with at least 60% alcohol, covering all surfaces of your hands and rubbing them together until they feel dry. Soap and water are the best option if hands are visibly dirty. Avoid touching your eyes, nose, and mouth with unwashed hands. Avoid sharing personal household items  You should not share dishes, drinking glasses, cups, eating utensils, towels, or bedding with other people or pets in your home. After using these items, they should be washed thoroughly with soap and water.   Clean all high-touch surfaces everyday  High touch surfaces include

## 2020-05-05 RX ORDER — CLONAZEPAM 0.5 MG/1
0.5 TABLET ORAL 2 TIMES DAILY
Qty: 30 TABLET | Refills: 0 | Status: SHIPPED | OUTPATIENT
Start: 2020-05-05 | End: 2020-08-07 | Stop reason: SDUPTHER

## 2020-06-12 RX ORDER — OMEPRAZOLE 20 MG/1
20 CAPSULE, DELAYED RELEASE ORAL
Qty: 58 CAPSULE | Refills: 0 | Status: SHIPPED | OUTPATIENT
Start: 2020-06-12 | End: 2020-12-17

## 2020-06-12 RX ORDER — CLONAZEPAM 0.5 MG/1
0.5 TABLET ORAL 2 TIMES DAILY
Qty: 60 TABLET | Refills: 0 | Status: SHIPPED | OUTPATIENT
Start: 2020-06-12 | End: 2020-12-17

## 2020-07-02 ENCOUNTER — OFFICE VISIT (OUTPATIENT)
Dept: FAMILY MEDICINE CLINIC | Age: 75
End: 2020-07-02
Payer: MEDICARE

## 2020-07-02 VITALS
WEIGHT: 192 LBS | DIASTOLIC BLOOD PRESSURE: 77 MMHG | HEART RATE: 79 BPM | BODY MASS INDEX: 35.33 KG/M2 | HEIGHT: 62 IN | OXYGEN SATURATION: 94 % | SYSTOLIC BLOOD PRESSURE: 134 MMHG

## 2020-07-02 PROCEDURE — 1090F PRES/ABSN URINE INCON ASSESS: CPT | Performed by: FAMILY MEDICINE

## 2020-07-02 PROCEDURE — 1123F ACP DISCUSS/DSCN MKR DOCD: CPT | Performed by: FAMILY MEDICINE

## 2020-07-02 PROCEDURE — 99214 OFFICE O/P EST MOD 30 MIN: CPT | Performed by: FAMILY MEDICINE

## 2020-07-02 PROCEDURE — G8400 PT W/DXA NO RESULTS DOC: HCPCS | Performed by: FAMILY MEDICINE

## 2020-07-02 PROCEDURE — 3023F SPIROM DOC REV: CPT | Performed by: FAMILY MEDICINE

## 2020-07-02 PROCEDURE — 4040F PNEUMOC VAC/ADMIN/RCVD: CPT | Performed by: FAMILY MEDICINE

## 2020-07-02 PROCEDURE — G8427 DOCREV CUR MEDS BY ELIG CLIN: HCPCS | Performed by: FAMILY MEDICINE

## 2020-07-02 PROCEDURE — 1036F TOBACCO NON-USER: CPT | Performed by: FAMILY MEDICINE

## 2020-07-02 PROCEDURE — G8417 CALC BMI ABV UP PARAM F/U: HCPCS | Performed by: FAMILY MEDICINE

## 2020-07-02 PROCEDURE — 20610 DRAIN/INJ JOINT/BURSA W/O US: CPT | Performed by: FAMILY MEDICINE

## 2020-07-02 PROCEDURE — 3017F COLORECTAL CA SCREEN DOC REV: CPT | Performed by: FAMILY MEDICINE

## 2020-07-02 PROCEDURE — G8926 SPIRO NO PERF OR DOC: HCPCS | Performed by: FAMILY MEDICINE

## 2020-07-02 RX ORDER — TRIAMCINOLONE ACETONIDE 40 MG/ML
40 INJECTION, SUSPENSION INTRA-ARTICULAR; INTRAMUSCULAR ONCE
Status: COMPLETED | OUTPATIENT
Start: 2020-07-02 | End: 2020-07-02

## 2020-07-02 RX ADMIN — TRIAMCINOLONE ACETONIDE 40 MG: 40 INJECTION, SUSPENSION INTRA-ARTICULAR; INTRAMUSCULAR at 16:28

## 2020-07-02 ASSESSMENT — ENCOUNTER SYMPTOMS
FACIAL SWELLING: 0
SORE THROAT: 0
SINUS PRESSURE: 0
PHOTOPHOBIA: 0
WHEEZING: 1
NAUSEA: 0
TROUBLE SWALLOWING: 0
SHORTNESS OF BREATH: 0
COLOR CHANGE: 0
ABDOMINAL DISTENTION: 0
DIARRHEA: 0
CHEST TIGHTNESS: 0
VOMITING: 0
APNEA: 0
ABDOMINAL PAIN: 0
ANAL BLEEDING: 0
EYE PAIN: 0
EYE DISCHARGE: 0
CONSTIPATION: 0

## 2020-07-02 NOTE — PROGRESS NOTES
Problem Relation Age of Onset    Cancer Maternal Grandmother         UNKNOWN       Social History     Tobacco Use    Smoking status: Former Smoker     Last attempt to quit: 10/31/1994     Years since quittin.6    Smokeless tobacco: Never Used   Substance Use Topics    Alcohol use: Yes     Comment: 2 DRINKS A WEEK      Current Outpatient Medications   Medication Sig Dispense Refill    omeprazole (PRILOSEC) 20 MG delayed release capsule Take 1 capsule by mouth 2 times daily (before meals) 58 capsule 0    clonazePAM (KLONOPIN) 0.5 MG tablet Take 1 tablet by mouth 2 times daily for 30 days. 60 tablet 0    buPROPion (WELLBUTRIN XL) 150 MG extended release tablet Take 1 tablet by mouth every morning 30 tablet 2    clonazePAM (KLONOPIN) 0.5 MG tablet Take 1 tablet by mouth 2 times daily for 30 days. 30 tablet 0    predniSONE (DELTASONE) 20 MG tablet Take 1 tablet by mouth daily Take 3 tabs po x 2 days, 2 tabs po x 3 days, 1 tab po x 2 days, half tab po x 2 days (Patient not taking: Reported on 2020) 15 tablet 0    doxycycline hyclate (VIBRA-TABS) 100 MG tablet Take 1 tablet by mouth 2 times daily (Patient not taking: Reported on 2020) 20 tablet 0    fluticasone (FLONASE) 50 MCG/ACT nasal spray 2 sprays by Nasal route daily for 10 days 1 Bottle 0    Na Sulfate-K Sulfate-Mg Sulf 17.5-3.13-1.6 GM/177ML SOLN Use as directed in your patient instructions. (Patient not taking: Reported on 3/23/2020) 1 Bottle 0     No current facility-administered medications for this visit.       Allergies   Allergen Reactions    Cefdinir Itching    Morphine Nausea And Vomiting    Avelox [Moxifloxacin]      UNSURE OF REACTION      Ciprofloxacin Itching    Quinolones      UNSURE OF REACTION      Statins Other (See Comments)     MUSCLE WEAKNESS      Sulfa Antibiotics Itching       Health Maintenance   Topic Date Due    Hepatitis C screen  1945    DTaP/Tdap/Td vaccine (1 - Tdap) 1966    Shingles 8. SOB (shortness of breath)     9. Productive cough     10. Obesity (BMI 30-39.9)     11. Polyp of ascending colon, unspecified type     12. Nephrolithiasis     13. Recurrent UTI                   Plan:     The current medical regimen is effective;  continue present plan and medications. Full labs     Injection of Joint Procedure Note    Joint: Left knee  Indication: symptomatic OA  Material Injected: Kenalog and Lidocaine    Procedure: The joint was cleansed with iodine. Under sterile conditions the joint was injected without complications. The patient tolerated the procedure well. Follow up in 4 weeks was scheduled for joint examination and results. Fall precautions    Return in about 2 months (around 9/2/2020) for follow up. Orders Placed This Encounter   Procedures    CBC Auto Differential     Standing Status:   Future     Standing Expiration Date:   7/2/2021    Comprehensive Metabolic Panel     Standing Status:   Future     Standing Expiration Date:   7/2/2021    Lipid Panel     Standing Status:   Future     Standing Expiration Date:   7/2/2021     Order Specific Question:   Is Patient Fasting?/# of Hours     Answer:   yes    Hemoglobin A1C     Standing Status:   Future     Standing Expiration Date:   7/2/2021    TSH without Reflex     Standing Status:   Future     Standing Expiration Date:   7/2/2021    Vitamin D 25 Hydroxy     Standing Status:   Future     Standing Expiration Date:   7/3/2021    [facility] - Donovan Corral MD, Dermatology, [place]     Referral Priority:   Routine     Referral Type:   Eval and Treat     Referral Reason:   Specialty Services Required     Referred to Provider:   Ashok Talley MD     Requested Specialty:   Dermatology     Number of Visits Requested:   1    20610 - [place] DRAIN/INJECT LARGE JOINT/BURSA        Patient given educational materials - see patient instructions. Discussed use, benefit,and side effects of prescribed medications. All patient questions answered. Pt voiced understanding. Reviewed health maintenance. Instructed to continue current medications, diet and exercise. Patient agreed withtreatment plan. Follow up as directed.      Electronically signed by Suzette Rodarte MD on 7/2/2020 at 6:09 PM

## 2020-07-02 NOTE — LETTER
[facility]  [address] [place] [address] [address] [facility] [address]  Phone: [phone]  Fax: [phone]    Suzette Rodarte MD        July 2, 2020     Patient: Cici Avila   YOB: 1945   Date of Visit: 7/2/2020       To Whom It May Concern: It is my medical opinion that Veda Jennings requires a disability parking placard for the following reasons:osteoarthritis  She cannot walk 200 feet without stopping to rest.  Duration of need: 3 years till 07/02/2023    If you have any questions or concerns, please don't hesitate to call.     Sincerely,        Suzette Rodarte MD

## 2020-07-24 LAB
ALBUMIN SERPL-MCNC: 3.8 G/DL
ALP BLD-CCNC: 86 U/L
ALT SERPL-CCNC: 48 U/L
ANION GAP SERPL CALCULATED.3IONS-SCNC: NORMAL MMOL/L
AST SERPL-CCNC: 36 U/L
AVERAGE GLUCOSE: 117
BASOPHILS ABSOLUTE: NORMAL
BASOPHILS RELATIVE PERCENT: NORMAL
BILIRUB SERPL-MCNC: 0.4 MG/DL (ref 0.1–1.4)
BUN BLDV-MCNC: 19 MG/DL
CALCIUM SERPL-MCNC: 9.6 MG/DL
CHLORIDE BLD-SCNC: 107 MMOL/L
CHOLESTEROL, TOTAL: 214 MG/DL
CHOLESTEROL/HDL RATIO: 4.2
CO2: 25 MMOL/L
CREAT SERPL-MCNC: 0.68 MG/DL
EOSINOPHILS ABSOLUTE: NORMAL
EOSINOPHILS RELATIVE PERCENT: NORMAL
GFR CALCULATED: NORMAL
GLUCOSE BLD-MCNC: 87 MG/DL
HBA1C MFR BLD: 5.7 %
HCT VFR BLD CALC: 42.3 % (ref 36–46)
HDLC SERPL-MCNC: 51 MG/DL (ref 35–70)
HEMOGLOBIN: 13.8 G/DL (ref 12–16)
LDL CHOLESTEROL CALCULATED: 142 MG/DL (ref 0–160)
LYMPHOCYTES ABSOLUTE: NORMAL
LYMPHOCYTES RELATIVE PERCENT: NORMAL
MCH RBC QN AUTO: NORMAL PG
MCHC RBC AUTO-ENTMCNC: NORMAL G/DL
MCV RBC AUTO: NORMAL FL
MONOCYTES ABSOLUTE: NORMAL
MONOCYTES RELATIVE PERCENT: NORMAL
NEUTROPHILS ABSOLUTE: NORMAL
NEUTROPHILS RELATIVE PERCENT: NORMAL
PDW BLD-RTO: NORMAL %
PLATELET # BLD: 328 K/ΜL
PMV BLD AUTO: NORMAL FL
POTASSIUM SERPL-SCNC: 4.4 MMOL/L
RBC # BLD: 4.66 10^6/ΜL
SODIUM BLD-SCNC: 140 MMOL/L
TOTAL PROTEIN: 6.8
TRIGL SERPL-MCNC: 103 MG/DL
TSH SERPL DL<=0.05 MIU/L-ACNC: 3.09 UIU/ML
VITAMIN D 25-HYDROXY: 36.3
VITAMIN D2, 25 HYDROXY: NORMAL
VITAMIN D3,25 HYDROXY: NORMAL
VLDLC SERPL CALC-MCNC: 21 MG/DL
WBC # BLD: 6.85 10^3/ML

## 2020-08-07 ENCOUNTER — OFFICE VISIT (OUTPATIENT)
Dept: FAMILY MEDICINE CLINIC | Age: 75
End: 2020-08-07
Payer: MEDICARE

## 2020-08-07 VITALS
BODY MASS INDEX: 34.78 KG/M2 | SYSTOLIC BLOOD PRESSURE: 132 MMHG | TEMPERATURE: 97.3 F | HEART RATE: 76 BPM | WEIGHT: 189 LBS | DIASTOLIC BLOOD PRESSURE: 82 MMHG | HEIGHT: 62 IN

## 2020-08-07 PROCEDURE — 3017F COLORECTAL CA SCREEN DOC REV: CPT | Performed by: FAMILY MEDICINE

## 2020-08-07 PROCEDURE — 1090F PRES/ABSN URINE INCON ASSESS: CPT | Performed by: FAMILY MEDICINE

## 2020-08-07 PROCEDURE — G8427 DOCREV CUR MEDS BY ELIG CLIN: HCPCS | Performed by: FAMILY MEDICINE

## 2020-08-07 PROCEDURE — 99214 OFFICE O/P EST MOD 30 MIN: CPT | Performed by: FAMILY MEDICINE

## 2020-08-07 PROCEDURE — 1036F TOBACCO NON-USER: CPT | Performed by: FAMILY MEDICINE

## 2020-08-07 PROCEDURE — G8400 PT W/DXA NO RESULTS DOC: HCPCS | Performed by: FAMILY MEDICINE

## 2020-08-07 PROCEDURE — 3023F SPIROM DOC REV: CPT | Performed by: FAMILY MEDICINE

## 2020-08-07 PROCEDURE — G8417 CALC BMI ABV UP PARAM F/U: HCPCS | Performed by: FAMILY MEDICINE

## 2020-08-07 PROCEDURE — 1123F ACP DISCUSS/DSCN MKR DOCD: CPT | Performed by: FAMILY MEDICINE

## 2020-08-07 PROCEDURE — 4040F PNEUMOC VAC/ADMIN/RCVD: CPT | Performed by: FAMILY MEDICINE

## 2020-08-07 PROCEDURE — G8926 SPIRO NO PERF OR DOC: HCPCS | Performed by: FAMILY MEDICINE

## 2020-08-07 RX ORDER — CLONAZEPAM 0.5 MG/1
0.5 TABLET ORAL 2 TIMES DAILY
Qty: 60 TABLET | Refills: 0 | Status: SHIPPED | OUTPATIENT
Start: 2020-08-07 | End: 2020-10-13 | Stop reason: SDUPTHER

## 2020-08-07 ASSESSMENT — ENCOUNTER SYMPTOMS
FACIAL SWELLING: 0
BACK PAIN: 1
SHORTNESS OF BREATH: 0
SORE THROAT: 0
ANAL BLEEDING: 0
PHOTOPHOBIA: 0
ABDOMINAL DISTENTION: 0
VOMITING: 0
CONSTIPATION: 0
APNEA: 0
SINUS PRESSURE: 0
ABDOMINAL PAIN: 0
DIARRHEA: 0
COLOR CHANGE: 0
NAUSEA: 0
TROUBLE SWALLOWING: 0
EYE DISCHARGE: 0
CHEST TIGHTNESS: 0
EYE PAIN: 0
WHEEZING: 1

## 2020-08-07 NOTE — PROGRESS NOTES
Cancer Maternal Grandmother         UNKNOWN       Social History     Tobacco Use    Smoking status: Former Smoker     Last attempt to quit: 10/31/1994     Years since quittin.7    Smokeless tobacco: Never Used   Substance Use Topics    Alcohol use: Yes     Comment: 2 DRINKS A WEEK      Current Outpatient Medications   Medication Sig Dispense Refill    clonazePAM (KLONOPIN) 0.5 MG tablet Take 1 tablet by mouth 2 times daily for 30 days. 60 tablet 0    clonazePAM (KLONOPIN) 0.5 MG tablet Take 1 tablet by mouth 2 times daily for 30 days. 60 tablet 0    doxycycline hyclate (VIBRA-TABS) 100 MG tablet Take 1 tablet by mouth 2 times daily 20 tablet 0    Na Sulfate-K Sulfate-Mg Sulf 17.5-3.13-1.6 GM/177ML SOLN Use as directed in your patient instructions. 1 Bottle 0    buPROPion (WELLBUTRIN XL) 150 MG extended release tablet Take 1 tablet by mouth every morning 30 tablet 2    omeprazole (PRILOSEC) 20 MG delayed release capsule Take 1 capsule by mouth 2 times daily (before meals) 58 capsule 0    predniSONE (DELTASONE) 20 MG tablet Take 1 tablet by mouth daily Take 3 tabs po x 2 days, 2 tabs po x 3 days, 1 tab po x 2 days, half tab po x 2 days (Patient not taking: Reported on 2020) 15 tablet 0    fluticasone (FLONASE) 50 MCG/ACT nasal spray 2 sprays by Nasal route daily for 10 days 1 Bottle 0     No current facility-administered medications for this visit.       Allergies   Allergen Reactions    Cefdinir Itching    Morphine Nausea And Vomiting    Avelox [Moxifloxacin]      UNSURE OF REACTION      Ciprofloxacin Itching    Quinolones      UNSURE OF REACTION      Statins Other (See Comments)     MUSCLE WEAKNESS      Sulfa Antibiotics Itching       Health Maintenance   Topic Date Due    Hepatitis C screen  1945    DTaP/Tdap/Td vaccine (1 - Tdap) 1966    Shingles Vaccine (1 of 2) 1997    Pneumococcal 65+ years Vaccine (1 of 1 - PPSV23) 2012    Breast cancer screen  2019  Flu vaccine (1) 09/01/2020    Annual Wellness Visit (AWV)  10/16/2020    A1C test (Diabetic or Prediabetic)  07/24/2021    Lipid screen  07/24/2025    Colon cancer screen colonoscopy  10/31/2029    DEXA (modify frequency per FRAX score)  Completed    Hepatitis A vaccine  Aged Out    Hepatitis B vaccine  Aged Out    Hib vaccine  Aged Out    Meningococcal (ACWY) vaccine  Aged Out       Subjective:      Review of Systems   Constitutional: Positive for fatigue. Negative for fever and unexpected weight change. HENT: Negative for congestion, ear discharge, facial swelling, sinus pressure, sore throat and trouble swallowing. Eyes: Negative for photophobia, pain and discharge. Respiratory: Positive for wheezing. Negative for apnea, chest tightness and shortness of breath. Cardiovascular: Negative for chest pain and palpitations. Gastrointestinal: Negative for abdominal distention, abdominal pain, anal bleeding, constipation, diarrhea, nausea and vomiting. Endocrine: Negative for cold intolerance, heat intolerance, polydipsia, polyphagia and polyuria. Genitourinary: Negative for difficulty urinating, flank pain, frequency and hematuria. Musculoskeletal: Positive for arthralgias, back pain and gait problem. Negative for neck pain. Skin: Negative for color change and rash. Neurological: Negative for dizziness, syncope, facial asymmetry, speech difficulty and light-headedness. Hematological: Negative for adenopathy. Psychiatric/Behavioral: Positive for dysphoric mood and sleep disturbance. Negative for agitation, behavioral problems, confusion, hallucinations and suicidal ideas. The patient is nervous/anxious. The patient is not hyperactive. Objective:     Physical Exam  Vitals signs and nursing note reviewed. Constitutional:       General: She is not in acute distress. Appearance: She is well-developed. HENT:      Head: Normocephalic.    Neck:      Musculoskeletal: Normal range of motion and neck supple. Thyroid: No thyromegaly. Cardiovascular:      Rate and Rhythm: Normal rate and regular rhythm. Heart sounds: Normal heart sounds. No murmur. Pulmonary:      Breath sounds: Wheezing present. No rales. Chest:      Chest wall: No tenderness. Abdominal:      General: Bowel sounds are normal. There is no distension. Palpations: Abdomen is soft. There is no mass. Tenderness: There is no abdominal tenderness. There is no guarding or rebound. Musculoskeletal: Normal range of motion. Lymphadenopathy:      Cervical: No cervical adenopathy. Skin:     General: Skin is warm. Findings: No rash. Neurological:      Mental Status: She is alert and oriented to person, place, and time. Psychiatric:         Attention and Perception: Attention and perception normal.         Mood and Affect: Mood is anxious and depressed. Speech: Speech normal.         Behavior: Behavior normal. Behavior is cooperative. Thought Content: Thought content normal.       /82   Pulse 76   Temp 97.3 °F (36.3 °C) (Temporal)   Ht 5' 2\" (1.575 m)   Wt 189 lb (85.7 kg)   LMP 10/31/1975 (Approximate)   BMI 34.57 kg/m²     Assessment:       Diagnosis Orders   1. Essential hypertension     2. Anxiety  clonazePAM (KLONOPIN) 0.5 MG tablet   3. Chronic obstructive pulmonary disease with acute exacerbation ([address]     4. Generalized OA     5. Primary osteoarthritis of left knee     6. Gastroesophageal reflux disease with esophagitis     7. Obesity (BMI 30-39.9)     8. Dyslipidemia                   Plan:     The current medical regimen is effective;  continue present plan and medications. Rx Klonopin bid - addictive properties explained/patient accepted risk  Controlled Substance Monitoring:    Acute and Chronic Pain Monitoring:   RX Monitoring 8/7/2020   Attestation -   Periodic Controlled Substance Monitoring No signs of potential drug abuse or diversion identified.

## 2020-09-08 ENCOUNTER — APPOINTMENT (OUTPATIENT)
Dept: CT IMAGING | Age: 75
End: 2020-09-08
Payer: MEDICARE

## 2020-09-08 ENCOUNTER — HOSPITAL ENCOUNTER (EMERGENCY)
Age: 75
Discharge: HOME | End: 2020-09-08
Attending: EMERGENCY MEDICINE
Payer: MEDICARE

## 2020-09-08 VITALS
SYSTOLIC BLOOD PRESSURE: 117 MMHG | OXYGEN SATURATION: 98 % | HEIGHT: 64 IN | BODY MASS INDEX: 32.39 KG/M2 | DIASTOLIC BLOOD PRESSURE: 61 MMHG | HEART RATE: 68 BPM | TEMPERATURE: 98.1 F | WEIGHT: 189.7 LBS | RESPIRATION RATE: 16 BRPM

## 2020-09-08 LAB
-: NORMAL
ABSOLUTE EOS #: 0.18 K/UL (ref 0–0.44)
ABSOLUTE IMMATURE GRANULOCYTE: 0.03 K/UL (ref 0–0.3)
ABSOLUTE LYMPH #: 2.64 K/UL (ref 1.1–3.7)
ABSOLUTE MONO #: 0.58 K/UL (ref 0.1–1.2)
ALBUMIN SERPL-MCNC: 4 G/DL (ref 3.5–5.2)
ALBUMIN/GLOBULIN RATIO: NORMAL (ref 1–2.5)
ALP BLD-CCNC: 79 U/L (ref 35–104)
ALT SERPL-CCNC: 15 U/L (ref 5–33)
AMORPHOUS: NORMAL
AMYLASE: 33 U/L (ref 28–100)
ANION GAP SERPL CALCULATED.3IONS-SCNC: 11 MMOL/L (ref 9–17)
AST SERPL-CCNC: 17 U/L
BACTERIA: NORMAL
BASOPHILS # BLD: 1 % (ref 0–2)
BASOPHILS ABSOLUTE: 0.08 K/UL (ref 0–0.2)
BILIRUB SERPL-MCNC: 0.3 MG/DL (ref 0.3–1.2)
BILIRUBIN DIRECT: 0.08 MG/DL
BILIRUBIN URINE: NEGATIVE
BILIRUBIN, INDIRECT: 0.22 MG/DL (ref 0–1)
BUN BLDV-MCNC: 18 MG/DL (ref 8–23)
BUN/CREAT BLD: 28 (ref 9–20)
CALCIUM SERPL-MCNC: 9.2 MG/DL (ref 8.6–10.4)
CASTS UA: NORMAL /LPF
CHLORIDE BLD-SCNC: 106 MMOL/L (ref 98–107)
CO2: 22 MMOL/L (ref 20–31)
COLOR: YELLOW
COMMENT UA: ABNORMAL
CREAT SERPL-MCNC: 0.65 MG/DL (ref 0.5–0.9)
CRYSTALS, UA: NORMAL /HPF
DIFFERENTIAL TYPE: ABNORMAL
EOSINOPHILS RELATIVE PERCENT: 2 % (ref 1–4)
EPITHELIAL CELLS UA: NORMAL /HPF (ref 0–5)
GFR AFRICAN AMERICAN: >60 ML/MIN
GFR NON-AFRICAN AMERICAN: >60 ML/MIN
GFR SERPL CREATININE-BSD FRML MDRD: ABNORMAL ML/MIN/{1.73_M2}
GFR SERPL CREATININE-BSD FRML MDRD: ABNORMAL ML/MIN/{1.73_M2}
GLOBULIN: NORMAL G/DL (ref 1.5–3.8)
GLUCOSE BLD-MCNC: 93 MG/DL (ref 70–99)
GLUCOSE URINE: NEGATIVE
HCT VFR BLD CALC: 40.8 % (ref 36.3–47.1)
HEMOGLOBIN: 13.5 G/DL (ref 11.9–15.1)
IMMATURE GRANULOCYTES: 0 %
KETONES, URINE: NEGATIVE
LEUKOCYTE ESTERASE, URINE: NEGATIVE
LIPASE: 42 U/L (ref 13–60)
LYMPHOCYTES # BLD: 31 % (ref 24–43)
MCH RBC QN AUTO: 29.8 PG (ref 25.2–33.5)
MCHC RBC AUTO-ENTMCNC: 33.1 G/DL (ref 28.4–34.8)
MCV RBC AUTO: 90.1 FL (ref 82.6–102.9)
MONOCYTES # BLD: 7 % (ref 3–12)
MUCUS: NORMAL
MYOGLOBIN: 23 NG/ML (ref 25–58)
NITRITE, URINE: NEGATIVE
NRBC AUTOMATED: 0 PER 100 WBC
OTHER OBSERVATIONS UA: NORMAL
PDW BLD-RTO: 14.6 % (ref 11.8–14.4)
PH UA: 5.5 (ref 5–8)
PLATELET # BLD: 308 K/UL (ref 138–453)
PLATELET ESTIMATE: ABNORMAL
PMV BLD AUTO: 10.4 FL (ref 8.1–13.5)
POTASSIUM SERPL-SCNC: 4.2 MMOL/L (ref 3.7–5.3)
PROTEIN UA: NEGATIVE
RBC # BLD: 4.53 M/UL (ref 3.95–5.11)
RBC # BLD: ABNORMAL 10*6/UL
RBC UA: NORMAL /HPF (ref 0–2)
RENAL EPITHELIAL, UA: NORMAL /HPF
SEG NEUTROPHILS: 59 % (ref 36–65)
SEGMENTED NEUTROPHILS ABSOLUTE COUNT: 5.02 K/UL (ref 1.5–8.1)
SODIUM BLD-SCNC: 139 MMOL/L (ref 135–144)
SPECIFIC GRAVITY UA: 1.01 (ref 1–1.03)
TOTAL PROTEIN: 7 G/DL (ref 6.4–8.3)
TRICHOMONAS: NORMAL
TROPONIN INTERP: ABNORMAL
TROPONIN T: ABNORMAL NG/ML
TROPONIN, HIGH SENSITIVITY: <6 NG/L (ref 0–14)
TURBIDITY: CLEAR
URINE HGB: ABNORMAL
UROBILINOGEN, URINE: NORMAL
WBC # BLD: 8.5 K/UL (ref 3.5–11.3)
WBC # BLD: ABNORMAL 10*3/UL
WBC UA: NORMAL /HPF (ref 0–5)
YEAST: NORMAL

## 2020-09-08 PROCEDURE — 81001 URINALYSIS AUTO W/SCOPE: CPT

## 2020-09-08 PROCEDURE — 96375 TX/PRO/DX INJ NEW DRUG ADDON: CPT

## 2020-09-08 PROCEDURE — 96374 THER/PROPH/DIAG INJ IV PUSH: CPT

## 2020-09-08 PROCEDURE — 80076 HEPATIC FUNCTION PANEL: CPT

## 2020-09-08 PROCEDURE — 82150 ASSAY OF AMYLASE: CPT

## 2020-09-08 PROCEDURE — 6360000004 HC RX CONTRAST MEDICATION: Performed by: NURSE PRACTITIONER

## 2020-09-08 PROCEDURE — 2580000003 HC RX 258: Performed by: NURSE PRACTITIONER

## 2020-09-08 PROCEDURE — 71275 CT ANGIOGRAPHY CHEST: CPT

## 2020-09-08 PROCEDURE — 85025 COMPLETE CBC W/AUTO DIFF WBC: CPT

## 2020-09-08 PROCEDURE — 83690 ASSAY OF LIPASE: CPT

## 2020-09-08 PROCEDURE — 99284 EMERGENCY DEPT VISIT MOD MDM: CPT

## 2020-09-08 PROCEDURE — 80048 BASIC METABOLIC PNL TOTAL CA: CPT

## 2020-09-08 PROCEDURE — 6360000002 HC RX W HCPCS: Performed by: NURSE PRACTITIONER

## 2020-09-08 PROCEDURE — 84484 ASSAY OF TROPONIN QUANT: CPT

## 2020-09-08 PROCEDURE — 93005 ELECTROCARDIOGRAM TRACING: CPT | Performed by: NURSE PRACTITIONER

## 2020-09-08 PROCEDURE — 83874 ASSAY OF MYOGLOBIN: CPT

## 2020-09-08 RX ORDER — SODIUM CHLORIDE 0.9 % (FLUSH) 0.9 %
10 SYRINGE (ML) INJECTION PRN
Status: DISCONTINUED | OUTPATIENT
Start: 2020-09-08 | End: 2020-09-08 | Stop reason: HOSPADM

## 2020-09-08 RX ORDER — CYCLOBENZAPRINE HCL 10 MG
10 TABLET ORAL 3 TIMES DAILY PRN
Qty: 21 TABLET | Refills: 0 | Status: SHIPPED | OUTPATIENT
Start: 2020-09-08 | End: 2020-09-18

## 2020-09-08 RX ORDER — ONDANSETRON 2 MG/ML
4 INJECTION INTRAMUSCULAR; INTRAVENOUS ONCE
Status: COMPLETED | OUTPATIENT
Start: 2020-09-08 | End: 2020-09-08

## 2020-09-08 RX ORDER — MORPHINE SULFATE 4 MG/ML
4 INJECTION, SOLUTION INTRAMUSCULAR; INTRAVENOUS ONCE
Status: COMPLETED | OUTPATIENT
Start: 2020-09-08 | End: 2020-09-08

## 2020-09-08 RX ORDER — HYDROCODONE BITARTRATE AND ACETAMINOPHEN 5; 325 MG/1; MG/1
1 TABLET ORAL EVERY 6 HOURS PRN
Qty: 12 TABLET | Refills: 0 | Status: SHIPPED | OUTPATIENT
Start: 2020-09-08 | End: 2020-09-11

## 2020-09-08 RX ADMIN — ONDANSETRON 4 MG: 2 INJECTION INTRAMUSCULAR; INTRAVENOUS at 13:24

## 2020-09-08 RX ADMIN — IOPAMIDOL 100 ML: 755 INJECTION, SOLUTION INTRAVENOUS at 13:18

## 2020-09-08 RX ADMIN — Medication 10 ML: at 13:18

## 2020-09-08 RX ADMIN — MORPHINE SULFATE 4 MG: 4 INJECTION, SOLUTION INTRAMUSCULAR; INTRAVENOUS at 13:25

## 2020-09-08 ASSESSMENT — ENCOUNTER SYMPTOMS
DIARRHEA: 0
NAUSEA: 0
VOMITING: 0
BACK PAIN: 1
SORE THROAT: 0
SHORTNESS OF BREATH: 0
COUGH: 0
COLOR CHANGE: 0
CONSTIPATION: 0
ABDOMINAL PAIN: 1
SINUS PRESSURE: 0
RHINORRHEA: 0
WHEEZING: 0

## 2020-09-08 ASSESSMENT — PAIN SCALES - GENERAL
PAINLEVEL_OUTOF10: 9
PAINLEVEL_OUTOF10: 4
PAINLEVEL_OUTOF10: 8

## 2020-09-08 ASSESSMENT — PAIN DESCRIPTION - PAIN TYPE: TYPE: ACUTE PAIN

## 2020-09-08 NOTE — ED PROVIDER NOTES
[address] [place] [address] ED  eMERGENCY dEPARTMENT eNCOUnter      Pt Name: Guillaume Painter  MRN: [id number]  [place] 1945  Date of evaluation: 9/8/2020  Provider: Aleida Bro NP, JUSTIN Casas [id number]       Chief Complaint   Patient presents with    Abdominal Pain    Back Pain         HISTORY OF PRESENT ILLNESS  (Location/Symptom, Timing/Onset, Context/Setting, Quality, Duration, Modifying Factors, Severity.)   Guillaume Painter is a 69 y.o. female who presents to the emergency department today by private vehicle for evaluation of a 3-day history of right-sided mid back pain that radiates into the abdomen. She states that it is worse when she moves and worse when she takes a deep breath. She states the symptoms was 3 days ago. Denies any specific injury, fall, or trauma. She denies a history of any GI or  problems. She states that she feels like the pain is worse when she takes a deep breath in but she does not feel short of breath. She denies any nausea, vomiting, or diarrhea. She does not smoke tobacco.  She denies any recent travel. Nursing Notes were reviewed. ALLERGIES     Cefdinir; Morphine; Avelox [moxifloxacin]; Ciprofloxacin; Quinolones; Statins; and Sulfa antibiotics    CURRENT MEDICATIONS       Discharge Medication List as of 9/8/2020  2:36 PM      CONTINUE these medications which have NOT CHANGED    Details   clonazePAM (KLONOPIN) 0.5 MG tablet Take 1 tablet by mouth 2 times daily for 30 days. , Disp-60 tablet,R-0Normal      omeprazole (PRILOSEC) 20 MG delayed release capsule Take 1 capsule by mouth 2 times daily (before meals), Disp-58 capsule, R-0Normal      clonazePAM (KLONOPIN) 0.5 MG tablet Take 1 tablet by mouth 2 times daily for 30 days. , Disp-60 tablet,R-0Normal      predniSONE (DELTASONE) 20 MG tablet Take 1 tablet by mouth daily Take 3 tabs po x 2 days, 2 tabs po x 3 days, 1 tab po x 2 days, half tab po x 2 days, Disp-15 tablet, R-0Normal      doxycycline hyclate (VIBRA-TABS) 100 MG tablet Take 1 tablet by mouth 2 times daily, Disp-20 tablet, R-0Normal      fluticasone (FLONASE) 50 MCG/ACT nasal spray 2 sprays by Nasal route daily for 10 days, Disp-1 Bottle, R-0Normal      Na Sulfate-K Sulfate-Mg Sulf 17.5-3.13-1.6 GM/177ML SOLN Use as directed in your patient instructions. , Disp-1 Bottle, R-0Normal      buPROPion (WELLBUTRIN XL) 150 MG extended release tablet Take 1 tablet by mouth every morning, Disp-30 tablet, R-2Normal             PAST MEDICAL HISTORY         Diagnosis Date    Arthritis     GENERALIZED    Fibromyalgia     GERD (gastroesophageal reflux disease) DX PRIOR TO     ON RX     GERD (gastroesophageal reflux disease)     Hyperlipidemia     NO RX ALLERGIC TO STATINS    Hypertension     Kidney stone  & 2013    X 2-SEVERAL STONES EACH TIME, PASSED ON OWN    Pneumonia     Recurrent UTI     Sleep apnea     HAS MACHINE BUT DOES NOT USE    Vision abnormalities     RT DISTORTED, LT DECREASED    Wears glasses        SURGICAL HISTORY           Procedure Laterality Date    APPENDECTOMY  [address] SECTION, CLASSIC  1964, 1972, & 4011    COLONOSCOPY      COLONOSCOPY N/A 10/31/2019    COLONOSCOPY WITH BIOPSY performed by Jazmín Katz MD at [address] VITRECTOMY Right 2014         FAMILY HISTORY           Problem Relation Age of Onset    Cancer Maternal Grandmother         UNKNOWN     Family Status   Relation Name Status    Mother Bipin Duran     Father Ann Santana     Sister Tanisha Current Alive    MGM      MGF      [facility]      PGF          SOCIAL HISTORY      reports that she quit smoking about 25 years ago. She has never used smokeless tobacco. She reports current alcohol use. She reports that she does not use drugs.     REVIEW OF SYSTEMS    (2-9 systems for level 4, 10 or more for level 5)     Review of Systems   Constitutional: Negative for chills, fever and unexpected weight change. HENT: Negative for congestion, rhinorrhea, sinus pressure and sore throat. Respiratory: Negative for cough, shortness of breath and wheezing. Cardiovascular: Negative for chest pain and palpitations. Gastrointestinal: Positive for abdominal pain. Negative for constipation, diarrhea, nausea and vomiting. Genitourinary: Negative for dysuria and hematuria. Musculoskeletal: Positive for back pain. Negative for arthralgias and myalgias. Skin: Negative for color change and rash. Neurological: Negative for dizziness, weakness and headaches. Hematological: Negative for adenopathy. Except as noted above the remainder of the review of systems was reviewed and negative. PHYSICAL EXAM    (up to 7 for level 4, 8 or more for level 5)     ED Triage Vitals   BP Temp Temp Source Pulse Resp SpO2 Height Weight   09/08/20 1032 09/08/20 1032 09/08/20 1417 09/08/20 1032 09/08/20 1032 09/08/20 1032 09/08/20 1031 09/08/20 1031   (!) 158/89 97.4 °F (36.3 °C) Oral 73 14 95 % 5' 4\" (1.626 m) 189 lb 11.2 oz (86 kg)       Physical Exam  Vitals signs reviewed. Constitutional:       Appearance: She is well-developed. HENT:      Head: Normocephalic and atraumatic. Eyes:      Conjunctiva/sclera: Conjunctivae normal.      Pupils: Pupils are equal, round, and reactive to light. Neck:      Musculoskeletal: Normal range of motion and neck supple. Cardiovascular:      Rate and Rhythm: Normal rate and regular rhythm. Pulmonary:      Effort: Pulmonary effort is normal. No respiratory distress. Breath sounds: Normal breath sounds. No stridor. Abdominal:      General: Bowel sounds are normal.      Palpations: Abdomen is soft. Musculoskeletal: Normal range of motion. Lymphadenopathy:      Cervical: No cervical adenopathy. Skin:     General: Skin is warm and dry. Findings: No rash.    Neurological:      Mental Status: She is alert and oriented to person, place, and time. RADIOLOGY:   Non-plain film images such as CT, Ultrasound and MRI are read by the radiologist. Quiana Gill radiographic images are visualized and preliminarily interpreted by the emergency physician with the below findings:    Cta Chest Abdomen Pelvis W Contrast    Result Date: 9/8/2020  EXAMINATION: CTA OF THE CHEST, ABDOMEN AND PELVIS WITH CONTRAST, 9/8/2020 12:58 pm TECHNIQUE: CTA of the chest, abdomen and pelvis was performed after the administration of intravenous contrast.  Multiplanar reformatted images are provided for review. MIP images are provided for review. Dose modulation, iterative reconstruction, and/or weight based adjustment of the mA/kV was utilized to reduce the radiation dose to as low as reasonably achievable. COMPARISON: 03/30/2018, 06/19/2007 HISTORY: ORDERING SYSTEM PROVIDED HISTORY: chest and abd pain TECHNOLOGIST PROVIDED HISTORY: chest and abd pain Reason for Exam: chest wall pain , rt side two days, no trauma, denies abd pain Acuity: Acute Type of Exam: Initial FINDINGS: Vasculature: Mild aortic atherosclerosis. No evidence of dissection or aneurysmal dilation. The visceral, renal, and iliac arteries are widely patent with mild atherosclerosis. The venous structures are grossly intact. No evidence of an intramural hematoma. Remainder of exam: The thyroid is within normal limits. No pericardial pleural effusion. The esophagus is within normal limits. No mediastinal adenopathy. Bibasilar atelectasis. No consolidations. Mild emphysematous changes. The central airways are patent. No acute abnormality of the intra-organs. The bile ducts and ureters are nondilated. The bladder is within normal limits. Colonic diverticulosis, predominantly in the sigmoid colon. The appendix is not well visualized. No bowel obstruction. No lymphadenopathy. No free air or free fluid. No acute osseous abnormality.      1. No evidence of aortic dissection or aneurysmal dilation. 2. Mild emphysematous changes. Interpretation per the Radiologist below, if available at the time of this note:    CTA CHEST [address]   Final Result   1. No evidence of aortic dissection or aneurysmal dilation. 2. Mild emphysematous changes. LABS:  Labs Reviewed   CBC WITH AUTO DIFFERENTIAL - Abnormal; Notable for the following components:       Result Value    RDW 14.6 (*)     All other components within normal limits   BASIC METABOLIC PANEL - Abnormal; Notable for the following components:    Bun/Cre Ratio 28 (*)     All other components within normal limits   TROP/MYOGLOBIN - Abnormal; Notable for the following components:    Myoglobin 23 (*)     All other components within normal limits   URINE RT REFLEX TO CULTURE - Abnormal; Notable for the following components:    Urine Hgb TRACE (*)     All other components within normal limits   LIPASE   AMYLASE   HEPATIC FUNCTION PANEL   MICROSCOPIC URINALYSIS       All other labs were within normal range or not returned as of this dictation. EMERGENCY DEPARTMENT COURSE and DIFFERENTIAL DIAGNOSIS/MDM:   Vitals:    Vitals:    09/08/20 1031 09/08/20 1032 09/08/20 1417   BP:  (!) 158/89 117/61   Pulse:  73 68   Resp:  14 16   Temp:  97.4 °F (36.3 °C) 98.1 °F (36.7 °C)   TempSrc:   Oral   SpO2:  95% 98%   Weight: 189 lb 11.2 oz (86 kg)     Height: 5' 4\" (1.626 m)         Medical Decision Making: Patient's laboratory studies to include CT of the chest abdomen pelvis are unremarkable. Patient is able to be discharged home. Follow-up with primary care physician. Medications   iopamidol (ISOVUE-370) 76 % injection 100 mL (100 mLs Intravenous Given 9/8/20 1318)   morphine sulfate (PF) injection 4 mg (4 mg Intravenous Given 9/8/20 1325)   ondansetron (ZOFRAN) injection 4 mg (4 mg Intravenous Given 9/8/20 1324)       FINAL IMPRESSION      1. Acute right-sided thoracic back pain    2.  Right upper quadrant abdominal pain DISPOSITION/PLAN   DISPOSITION Decision To Discharge 09/08/2020 02:35:14 PM      PATIENT REFERRED TO:   Herber Jenkins MD  [address] [place] [address]  [phone]    Schedule an appointment as soon as possible for a visit       [facility] ED  [address] [facility]  [phone]    If symptoms worsen      DISCHARGE MEDICATIONS:     Discharge Medication List as of 9/8/2020  2:36 PM      START taking these medications    Details   HYDROcodone-acetaminophen (NORCO) 5-325 MG per tablet Take 1 tablet by mouth every 6 hours as needed for Pain for up to 3 days. , Disp-12 tablet,R-0Print      cyclobenzaprine (FLEXERIL) 10 MG tablet Take 1 tablet by mouth 3 times daily as needed for Muscle spasms, Disp-21 tablet,R-0Print                 (Please note that portions of this note were completed with a voice recognition program.  Efforts were made to edit the dictations but occasionally words are mis-transcribed.)    [facility] CHEMA, JUSTIN - CNP  Certified Nurse Practitioner          JUSTIN Pride CNP  09/08/20 0164

## 2020-09-09 ENCOUNTER — TELEPHONE (OUTPATIENT)
Dept: FAMILY MEDICINE CLINIC | Age: 75
End: 2020-09-09

## 2020-09-09 LAB
EKG ATRIAL RATE: 69 BPM
EKG P AXIS: 60 DEGREES
EKG P-R INTERVAL: 182 MS
EKG Q-T INTERVAL: 432 MS
EKG QRS DURATION: 78 MS
EKG QTC CALCULATION (BAZETT): 462 MS
EKG R AXIS: 23 DEGREES
EKG T AXIS: 43 DEGREES
EKG VENTRICULAR RATE: 69 BPM

## 2020-09-10 ENCOUNTER — HOSPITAL ENCOUNTER (OUTPATIENT)
Dept: ULTRASOUND IMAGING | Age: 75
Discharge: HOME | End: 2020-09-12
Payer: MEDICARE

## 2020-09-10 ENCOUNTER — VIRTUAL VISIT (OUTPATIENT)
Dept: FAMILY MEDICINE CLINIC | Age: 75
End: 2020-09-10
Payer: MEDICARE

## 2020-09-10 PROBLEM — F32.0 MAJOR DEPRESSIVE DISORDER, SINGLE EPISODE, MILD (HCC): Status: ACTIVE | Noted: 2020-09-10

## 2020-09-10 PROCEDURE — 76705 ECHO EXAM OF ABDOMEN: CPT

## 2020-09-10 PROCEDURE — 99442 PR PHYS/QHP TELEPHONE EVALUATION 11-20 MIN: CPT | Performed by: FAMILY MEDICINE

## 2020-09-10 ASSESSMENT — PATIENT HEALTH QUESTIONNAIRE - PHQ9
SUM OF ALL RESPONSES TO PHQ QUESTIONS 1-9: 0
2. FEELING DOWN, DEPRESSED OR HOPELESS: 0
1. LITTLE INTEREST OR PLEASURE IN DOING THINGS: 0
SUM OF ALL RESPONSES TO PHQ9 QUESTIONS 1 & 2: 0
SUM OF ALL RESPONSES TO PHQ QUESTIONS 1-9: 0

## 2020-09-10 NOTE — PROGRESS NOTES
Zhou Harmon is a 69 y.o. female evaluated via telephone on 9/10/2020. Consent:  She and/or health care decision maker is aware that that she may receive a bill for this telephone service, depending on her insurance coverage, and has provided verbal consent to proceed: Yes      Documentation:  I communicated with the patient and/or health care decision maker about patient is being evaluated via telephone visit she states several days ago she developed extreme right upper quadrant pain underneath her breast wrapping around into her back she states the pain is there constantly movement makes a little worse she has not noted food as she is essentially had to stand better pain so bad. She did go to the emergency room they did a CT scan which was essentially negative UA was also negative they did not give her a opinion as to what was wrong with her and told her to follow-up with her PCP. Details of this discussion including any medical advice provided: Her having discussed patient's history with her I feel that a gallbladder ultrasound would be the next diagnostic test needed to be done I have placed order for that given patient phone number and instructions to schedule she is to stay on a very mild diet and will notify her of test results      I affirm this is a Patient Initiated Episode with a Patient who has not had a related appointment within my department in the past 7 days or scheduled within the next 24 hours.     Patient identification was verified at the start of the visit: Yes    Total Time: minutes: 11-20 minutes    Note: not billable if this call serves to triage the patient into an appointment for the relevant concern      Amarjit Lombardo

## 2020-09-11 ENCOUNTER — NURSE TRIAGE (OUTPATIENT)
Dept: OTHER | Facility: CLINIC | Age: 75
End: 2020-09-11

## 2020-09-11 ENCOUNTER — TELEPHONE (OUTPATIENT)
Dept: FAMILY MEDICINE CLINIC | Age: 75
End: 2020-09-11

## 2020-09-11 NOTE — TELEPHONE ENCOUNTER
Says she had gallbladder US done and in a lot of pain , wants to know, what to do next, been in bed for past week

## 2020-09-11 NOTE — TELEPHONE ENCOUNTER
n/a    Protocols used: ABDOMINAL PAIN - UPPER-ADULT-AH    Pt was in ED Tuesday pt d/c follow up with Dr Bhavana Kaminski pt states she was told one time her gallbladder had a stone and another person told her normal US of gallbladder pt would like to clarify these results and would like to know what to do next as pt still has pain in RUQ under right rib pt states small amount of swelling pt also states she feels her abdomen is bloated and is having nausea but did not have nausea until today. States some relief with pain medication and muscle relaxer but does not want to stay on those medications for too long. Warm transfer to [facility] to leave message for on call provider as office is closed. Second level NP unable to read test results or consult so defer to PCP office. Pt would like to avoid return to ED. Please do not respond to the triage nurse through this encounter. Any subsequent communication should be directly with the patient.

## 2020-09-29 ENCOUNTER — TELEPHONE (OUTPATIENT)
Dept: GASTROENTEROLOGY | Age: 75
End: 2020-09-29

## 2020-10-05 ENCOUNTER — OFFICE VISIT (OUTPATIENT)
Dept: FAMILY MEDICINE CLINIC | Age: 75
End: 2020-10-05
Payer: MEDICARE

## 2020-10-05 VITALS
BODY MASS INDEX: 34.78 KG/M2 | DIASTOLIC BLOOD PRESSURE: 81 MMHG | OXYGEN SATURATION: 97 % | TEMPERATURE: 97.5 F | HEIGHT: 62 IN | SYSTOLIC BLOOD PRESSURE: 159 MMHG | HEART RATE: 75 BPM | WEIGHT: 189 LBS

## 2020-10-05 PROCEDURE — 1123F ACP DISCUSS/DSCN MKR DOCD: CPT | Performed by: FAMILY MEDICINE

## 2020-10-05 PROCEDURE — 4040F PNEUMOC VAC/ADMIN/RCVD: CPT | Performed by: FAMILY MEDICINE

## 2020-10-05 PROCEDURE — 1090F PRES/ABSN URINE INCON ASSESS: CPT | Performed by: FAMILY MEDICINE

## 2020-10-05 PROCEDURE — G8484 FLU IMMUNIZE NO ADMIN: HCPCS | Performed by: FAMILY MEDICINE

## 2020-10-05 PROCEDURE — 99214 OFFICE O/P EST MOD 30 MIN: CPT | Performed by: FAMILY MEDICINE

## 2020-10-05 PROCEDURE — G8427 DOCREV CUR MEDS BY ELIG CLIN: HCPCS | Performed by: FAMILY MEDICINE

## 2020-10-05 PROCEDURE — G8400 PT W/DXA NO RESULTS DOC: HCPCS | Performed by: FAMILY MEDICINE

## 2020-10-05 PROCEDURE — G8417 CALC BMI ABV UP PARAM F/U: HCPCS | Performed by: FAMILY MEDICINE

## 2020-10-05 PROCEDURE — 3017F COLORECTAL CA SCREEN DOC REV: CPT | Performed by: FAMILY MEDICINE

## 2020-10-05 PROCEDURE — 1036F TOBACCO NON-USER: CPT | Performed by: FAMILY MEDICINE

## 2020-10-05 RX ORDER — LISINOPRIL 10 MG/1
10 TABLET ORAL DAILY
Qty: 30 TABLET | Refills: 0 | Status: SHIPPED | OUTPATIENT
Start: 2020-10-05

## 2020-10-05 NOTE — PROGRESS NOTES
[facility]  [address] [place] [address] [address]AL [address] [facility] [address]  Dept: [phone]      Mary Carmen Trivedi is a 69 y.o. female who presents today for follow up on her  medical conditions as noted below. Chief Complaint   Patient presents with   [facility] Establish Care       Patient Active Problem List:     Dyslipidemia     Obesity     HTN (hypertension)     OA (osteoarthritis of spine)     HA (headache)     COPD (chronic obstructive pulmonary disease) (HCC)     Macular pucker     Depression     Obesity (BMI 30-39. 9)     Hypertension     Nephrolithiasis     Acute cystitis with hematuria     GERD (gastroesophageal reflux disease)     Recurrent UTI     Migraine without aura and without status migrainosus, not intractable     Generalized OA     Atypical chest pain     Acute pain of right shoulder     Impingement syndrome of right shoulder     Otalgia of left ear     Polyp of colon     Major depressive disorder, single episode, mild ([address]     Past Medical History:   Diagnosis Date    Arthritis 2012    GENERALIZED    Fibromyalgia 2012    GERD (gastroesophageal reflux disease) DX PRIOR TO 2004    ON RX     GERD (gastroesophageal reflux disease)     Hyperlipidemia     NO RX ALLERGIC TO STATINS    Hypertension     Kidney stone 2012 & 2013    X 2-SEVERAL STONES EACH TIME, PASSED ON OWN    Pneumonia 2012    Recurrent UTI     Sleep apnea 2012    HAS MACHINE BUT DOES NOT USE    Vision abnormalities 2014    RT DISTORTED, LT DECREASED    Wears glasses       Past Surgical History:   Procedure Laterality Date    APPENDECTOMY  1963   [facility], CLASSIC  1964, 0, & 1389    COLONOSCOPY      COLONOSCOPY N/A 10/31/2019    COLONOSCOPY WITH BIOPSY performed by Pablito Pizarro MD at [address] VITRECTOMY Right 11/6/2014     Family History   Problem Relation Age of Onset    Cancer Maternal Grandmother UNKNOWN       Current Outpatient Medications   Medication Sig Dispense Refill    lisinopril (PRINIVIL;ZESTRIL) 10 MG tablet Take 1 tablet by mouth daily 30 tablet 0    omeprazole (PRILOSEC) 20 MG delayed release capsule Take 1 capsule by mouth 2 times daily (before meals) 58 capsule 0    clonazePAM (KLONOPIN) 0.5 MG tablet Take 1 tablet by mouth 2 times daily for 30 days. 60 tablet 0    clonazePAM (KLONOPIN) 0.5 MG tablet Take 1 tablet by mouth 2 times daily for 30 days. 60 tablet 0    predniSONE (DELTASONE) 20 MG tablet Take 1 tablet by mouth daily Take 3 tabs po x 2 days, 2 tabs po x 3 days, 1 tab po x 2 days, half tab po x 2 days (Patient not taking: Reported on 2020) 15 tablet 0    doxycycline hyclate (VIBRA-TABS) 100 MG tablet Take 1 tablet by mouth 2 times daily (Patient not taking: Reported on 9/10/2020) 20 tablet 0    fluticasone (FLONASE) 50 MCG/ACT nasal spray 2 sprays by Nasal route daily for 10 days 1 Bottle 0    Na Sulfate-K Sulfate-Mg Sulf 17.5-3.13-1.6 GM/177ML SOLN Use as directed in your patient instructions. (Patient not taking: Reported on 9/10/2020) 1 Bottle 0     No current facility-administered medications for this visit.       ALLERGIES:    Allergies   Allergen Reactions    Cefdinir Itching    Morphine Nausea And Vomiting    Avelox [Moxifloxacin]      UNSURE OF REACTION      Ciprofloxacin Itching    Quinolones      UNSURE OF REACTION      Statins Other (See Comments)     MUSCLE WEAKNESS      Sulfa Antibiotics Itching       Social History     Tobacco Use    Smoking status: Former Smoker     Packs/day: 1.00     Years: 20.00     Pack years: 20.00     Last attempt to quit: 10/31/1994     Years since quittin.9    Smokeless tobacco: Never Used   Substance Use Topics    Alcohol use: Yes     Comment: 2 DRINKS A WEEK        LDL Calculated (mg/dL)   Date Value   2020 142     HDL (mg/dL)   Date Value   2020 51     BUN (mg/dL)   Date Value   2020 18 CREATININE (mg/dL)   Date Value   09/08/2020 0.65     Glucose (mg/dL)   Date Value   09/08/2020 93   12/14/2011 75     Hemoglobin A1C (%)   Date Value   07/24/2020 5.7              Subjective:      HPI  she is here today to establish care as a new patient  She is having a few problems she recently was in the hospital they ran some various testing she was having right upper quadrant pain wrapping around to her back gallbladder ultrasound was negative and essentially all of her labs were normal she had a CT scan of her chest which showed some very minimal emphysema. She has been quite short of breath for some time now she barely can walk up her stairs without resting while she is walking up. And she thought from hearing she had emphysema it was from that  She is hypertensive and has not been treated for years  She was found to have fatty liver on ultrasound  She states she is tried to lose weight and it is impossible for her  She claims to be busy because she watches grandchildren but she does no other formal exercise  And she has a history of anxiety disorder and takes Klonopin twice a day  She also states she has ongoing reflux problems she cannot lie down at night without stuff coming up in her throat she takes Tums she takes omeprazole but she also takes a lot of Aleve    Review of Systems:     Constitutional: Negative for fever, appetite change and fatigue. Family social and medical history reviewed and unchanged     HENT: Negative. Negative for nosebleeds, trouble swallowing and neck pain. Eyes: Negative for photophobia and visual disturbance. Respiratory: Negative. Negative for chest tightness and shortness of breath. Cardiovascular: Negative. Negative for chest pain and leg swelling. Gastrointestinal: Negative. Negative for abdominal pain and blood in stool. Endocrine: Negative for cold intolerance and polyuria. Genitourinary: Negative for dysuria and hematuria. Musculoskeletal: Negative. Skin: Negative for rash. Allergic/Immunologic: Negative. Neurological: Negative. Negative for dizziness, weakness and numbness. Hematological: Negative. Negative for adenopathy. Does not bruise/bleed easily. Psychiatric/Behavioral: Negative for sleep disturbance, dysphoric mood and  decreased concentration. The patient is not nervous/anxious. Objective:     Physical Exam:     Nursing note and vitals reviewed. BP (!) 159/81   Pulse 75   Temp 97.5 °F (36.4 °C)   Ht 5' 2\" (1.575 m)   Wt 189 lb (85.7 kg)   LMP 10/31/1975 (Approximate)   SpO2 97%   BMI 34.57 kg/m²   Constitutional: She is oriented to person, place, and time. She   appears well-developed and well-nourished. HENT:   Head: Normocephalic and atraumatic. Right Ear: External ear normal. Tympanic membrane is not erythematous. No middle ear effusion. Left Ear: External ear normal. Tympanic membrane is not erythematous. No middle ear effusion. Nose: No mucosal edema. Mouth/Throat: Oropharynx is clear and moist. No posterior oropharyngeal erythema. Eyes: Conjunctivae and EOM are normal. Pupils are equal, round, and reactive to light. Neck: Normal range of motion. Neck supple. No thyromegaly present. Cardiovascular: Normal rate, regular rhythm and normal heart sounds. No murmur heard. Pulmonary/Chest: Effort normal and breath sounds normal. She has no wheezes. Shehas no rales. Abdominal: Soft. Bowel sounds are normal. She exhibits no distension and no mass. There is no tenderness. There is no rebound and no guarding. Genitourinary/Anorectal:deferred  Musculoskeletal: Normal range of motion. She exhibits no edema or tenderness. Lymphadenopathy: She has no cervical adenopathy. Neurological: She is alert and oriented to person, place, and time. She has normal reflexes. Skin: Skin is warm and dry. No rash noted. Psychiatric: She has a normal mood and affect.  Her   behavior is normal. Patient acted like she was having a bit of an anxiety attack during exam she states wearing the mask makes her feel that way and it makes her more short of breath      Assessment:      1. Establishing care with new doctor, encounter for    2. SOB (shortness of breath) on exertion    3. Essential hypertension    4. Fatty liver          Plan:      Call or return to clinic prn if these symptoms worsen or fail to improve as anticipated. I have reviewed the instructions with the patient, answering all questions to her satisfaction. Return in about 4 weeks (around 11/2/2020) for htn.   Orders Placed This Encounter   Procedures    ECHO Complete 2D W Doppler W Color     Standing Status:   Future     Standing Expiration Date:   10/5/2021     Order Specific Question:   Reason for exam:     Answer:   sob with exertion     Orders Placed This Encounter   Medications    lisinopril (PRINIVIL;ZESTRIL) 10 MG tablet     Sig: Take 1 tablet by mouth daily     Dispense:  30 tablet     Refill:  0     I discussed with patient the need to check her heart start on blood pressure meds she also needs to stop taking any anti-inflammatories follow-up with the EGD follow-up with me in a month for recheck blood pressure  Electronically signed by Nancy Chery DO on 10/5/2020 at 2:09 PM

## 2020-10-07 ENCOUNTER — HOSPITAL ENCOUNTER (OUTPATIENT)
Dept: NON INVASIVE DIAGNOSTICS | Age: 75
Discharge: HOME | End: 2020-10-07
Payer: MEDICARE

## 2020-10-07 LAB
LV EF: 65 %
LVEF MODALITY: NORMAL

## 2020-10-07 PROCEDURE — 93306 TTE W/DOPPLER COMPLETE: CPT

## 2020-10-13 RX ORDER — CLONAZEPAM 0.5 MG/1
0.5 TABLET ORAL 2 TIMES DAILY
Qty: 60 TABLET | Refills: 0 | Status: SHIPPED | OUTPATIENT
Start: 2020-10-13 | End: 2021-03-26

## 2020-10-14 NOTE — TELEPHONE ENCOUNTER
Contacted Susanne Cooper to reschedule, she will be out of town. She is now scheduled on Thursday [email or web address] at [facility] with Dr. Caitlin Rodriguez. Covid-19 testing is scheduled on Sunday [email or web address], Susanne Cooper is okay with doing this on her birthday. Updated instructions mailed.

## 2020-11-08 ENCOUNTER — HOSPITAL ENCOUNTER (OUTPATIENT)
Dept: PREADMISSION TESTING | Age: 75
Setting detail: SPECIMEN
Discharge: HOME | End: 2020-11-08
Attending: INTERNAL MEDICINE | Admitting: INTERNAL MEDICINE
Payer: MEDICARE

## 2020-11-08 PROCEDURE — U0003 INFECTIOUS AGENT DETECTION BY NUCLEIC ACID (DNA OR RNA); SEVERE ACUTE RESPIRATORY SYNDROME CORONAVIRUS 2 (SARS-COV-2) (CORONAVIRUS DISEASE [COVID-19]), AMPLIFIED PROBE TECHNIQUE, MAKING USE OF HIGH THROUGHPUT TECHNOLOGIES AS DESCRIBED BY CMS-2020-01-R: HCPCS

## 2020-11-10 LAB — SARS-COV-2, NAA: NOT DETECTED

## 2020-11-11 ENCOUNTER — TELEPHONE (OUTPATIENT)
Dept: GASTROENTEROLOGY | Age: 75
End: 2020-11-11

## 2020-11-11 ENCOUNTER — ANESTHESIA EVENT (OUTPATIENT)
Dept: OPERATING ROOM | Age: 75
End: 2020-11-11
Payer: MEDICARE

## 2020-11-11 NOTE — TELEPHONE ENCOUNTER
Spoke with Brittany Lai to inform her of 745AM arrival for EGD scheduled 11/12/20 rather than 715Am. Roseanna agreeable.

## 2020-11-12 ENCOUNTER — ANESTHESIA (OUTPATIENT)
Dept: OPERATING ROOM | Age: 75
End: 2020-11-12
Payer: MEDICARE

## 2020-11-12 ENCOUNTER — HOSPITAL ENCOUNTER (OUTPATIENT)
Age: 75
Setting detail: OUTPATIENT SURGERY
Discharge: HOME | End: 2020-11-12
Attending: INTERNAL MEDICINE | Admitting: INTERNAL MEDICINE
Payer: MEDICARE

## 2020-11-12 VITALS
SYSTOLIC BLOOD PRESSURE: 167 MMHG | DIASTOLIC BLOOD PRESSURE: 98 MMHG | RESPIRATION RATE: 20 BRPM | OXYGEN SATURATION: 97 %

## 2020-11-12 VITALS
WEIGHT: 189 LBS | HEART RATE: 65 BPM | RESPIRATION RATE: 21 BRPM | TEMPERATURE: 96.9 F | DIASTOLIC BLOOD PRESSURE: 66 MMHG | HEIGHT: 61 IN | SYSTOLIC BLOOD PRESSURE: 166 MMHG | OXYGEN SATURATION: 96 % | BODY MASS INDEX: 35.68 KG/M2

## 2020-11-12 PROCEDURE — 2500000003 HC RX 250 WO HCPCS: Performed by: NURSE ANESTHETIST, CERTIFIED REGISTERED

## 2020-11-12 PROCEDURE — 43235 EGD DIAGNOSTIC BRUSH WASH: CPT | Performed by: INTERNAL MEDICINE

## 2020-11-12 PROCEDURE — 2580000003 HC RX 258: Performed by: ANESTHESIOLOGY

## 2020-11-12 PROCEDURE — 2709999900 HC NON-CHARGEABLE SUPPLY: Performed by: INTERNAL MEDICINE

## 2020-11-12 PROCEDURE — 7100000010 HC PHASE II RECOVERY - FIRST 15 MIN: Performed by: INTERNAL MEDICINE

## 2020-11-12 PROCEDURE — 7100000011 HC PHASE II RECOVERY - ADDTL 15 MIN: Performed by: INTERNAL MEDICINE

## 2020-11-12 PROCEDURE — 3609017100 HC EGD: Performed by: INTERNAL MEDICINE

## 2020-11-12 PROCEDURE — 3700000000 HC ANESTHESIA ATTENDED CARE: Performed by: INTERNAL MEDICINE

## 2020-11-12 PROCEDURE — 6360000002 HC RX W HCPCS: Performed by: NURSE ANESTHETIST, CERTIFIED REGISTERED

## 2020-11-12 RX ORDER — LIDOCAINE HYDROCHLORIDE 20 MG/ML
INJECTION, SOLUTION EPIDURAL; INFILTRATION; INTRACAUDAL; PERINEURAL PRN
Status: DISCONTINUED | OUTPATIENT
Start: 2020-11-12 | End: 2020-11-12 | Stop reason: SDUPTHER

## 2020-11-12 RX ORDER — SODIUM CHLORIDE, SODIUM LACTATE, POTASSIUM CHLORIDE, CALCIUM CHLORIDE 600; 310; 30; 20 MG/100ML; MG/100ML; MG/100ML; MG/100ML
INJECTION, SOLUTION INTRAVENOUS CONTINUOUS
Status: DISCONTINUED | OUTPATIENT
Start: 2020-11-13 | End: 2020-11-12 | Stop reason: HOSPADM

## 2020-11-12 RX ORDER — PANTOPRAZOLE SODIUM 40 MG/1
40 TABLET, DELAYED RELEASE ORAL DAILY
Qty: 30 TABLET | Refills: 5 | Status: SHIPPED | OUTPATIENT
Start: 2020-11-12 | End: 2020-12-17 | Stop reason: ALTCHOICE

## 2020-11-12 RX ORDER — PROPOFOL 10 MG/ML
INJECTION, EMULSION INTRAVENOUS PRN
Status: DISCONTINUED | OUTPATIENT
Start: 2020-11-12 | End: 2020-11-12 | Stop reason: SDUPTHER

## 2020-11-12 RX ORDER — SODIUM CHLORIDE 9 MG/ML
INJECTION, SOLUTION INTRAVENOUS CONTINUOUS
Status: DISCONTINUED | OUTPATIENT
Start: 2020-11-13 | End: 2020-11-12

## 2020-11-12 RX ORDER — LIDOCAINE HYDROCHLORIDE 10 MG/ML
1 INJECTION, SOLUTION EPIDURAL; INFILTRATION; INTRACAUDAL; PERINEURAL
Status: DISCONTINUED | OUTPATIENT
Start: 2020-11-12 | End: 2020-11-12 | Stop reason: HOSPADM

## 2020-11-12 RX ORDER — SODIUM CHLORIDE 0.9 % (FLUSH) 0.9 %
10 SYRINGE (ML) INJECTION PRN
Status: DISCONTINUED | OUTPATIENT
Start: 2020-11-12 | End: 2020-11-12 | Stop reason: HOSPADM

## 2020-11-12 RX ORDER — SODIUM CHLORIDE 0.9 % (FLUSH) 0.9 %
10 SYRINGE (ML) INJECTION EVERY 12 HOURS SCHEDULED
Status: DISCONTINUED | OUTPATIENT
Start: 2020-11-12 | End: 2020-11-12 | Stop reason: HOSPADM

## 2020-11-12 RX ADMIN — LIDOCAINE HYDROCHLORIDE 50 MG: 20 INJECTION, SOLUTION EPIDURAL; INFILTRATION; INTRACAUDAL; PERINEURAL at 09:26

## 2020-11-12 RX ADMIN — PROPOFOL 40 MG: 10 INJECTION, EMULSION INTRAVENOUS at 09:27

## 2020-11-12 RX ADMIN — PROPOFOL 40 MG: 10 INJECTION, EMULSION INTRAVENOUS at 09:26

## 2020-11-12 RX ADMIN — SODIUM CHLORIDE, POTASSIUM CHLORIDE, SODIUM LACTATE AND CALCIUM CHLORIDE: 600; 310; 30; 20 INJECTION, SOLUTION INTRAVENOUS at 07:59

## 2020-11-12 RX ADMIN — PROPOFOL 40 MG: 10 INJECTION, EMULSION INTRAVENOUS at 09:30

## 2020-11-12 ASSESSMENT — PULMONARY FUNCTION TESTS
PIF_VALUE: 0
PIF_VALUE: 1
PIF_VALUE: 0
PIF_VALUE: 1
PIF_VALUE: 0
PIF_VALUE: 0

## 2020-11-12 ASSESSMENT — PAIN - FUNCTIONAL ASSESSMENT: PAIN_FUNCTIONAL_ASSESSMENT: 0-10

## 2020-11-12 NOTE — OP NOTE
[facility]     PROCEDURE DATE: 11/12/20    REFERRING PHYSICIAN: No ref. provider found     PRIMARY CARE PROVIDER: Renee Askew MD    ATTENDING PHYSICIAN: Cecy Conte MD     HISTORY: Ms. Gege Cotto is a 70 y.o. female who presents to the [place] [address] Endoscopy unit for upper endoscopy. The patient's clinical history is remarkable for GERD. She is currently medically stable and appropriate for the planned procedure. PREOPERATIVE DIAGNOSIS: GERD. PROCEDURES:   1) Transoral Upper Endoscopy. POSTOPERATIVE DIAGNOSIS:   Small sliding hiatal hernia  Mild esophagitis     SPECIMENS: none    MEDICATIONS:   MAC per anesthesia    EBL: minimal    INSTRUMENT: Olympus GIF-H190 flexible Gastroscope. PREPARATION: The nature and character of the procedure as well as risks, benefits, and alternatives were discussed with the patient and informed consent was obtained. Complications were said to include, but were not limited to: medication allergy, medication reaction, cardiovascular and respiratory problems, bleeding, perforation, infection, and/or missed diagnosis. Following arrival in the endoscopy room, the patient was placed in the left lateral decubitus position and final time-out accomplished in the presence of the nursing staff. Baseline vital signs were obtained and reviewed, and IV sedation was subsequently initiated. FINDINGS:   Esophagus: The esophagus was inspected to the Z-line. The endoscopic exam showed 2-3 cm sliding hiatal henria. Mild erythema and edema and GE junction suggestive of GERD. Stomach: The stomach was inspected in both forward and retroflex fashion and was appropriately distensible. The cardia, fundus, incisura, antrum and pylorus were identified via direct visualization. The endoscopic exam showed no pathology. Duodenum: The proximal small bowel was inspected through the bulb, sweep, and second portion of the duodenum.  The endoscopic exam showed no pathology. RECOMMENDATIONS:   1) Follow up with referring provider, as previously scheduled. 2) Protonix 40 mg po daily. 3) Follow up with me in office in 4-6 weeks.         Ryland Doherty

## 2020-11-12 NOTE — ANESTHESIA PRE PROCEDURE
Department of Anesthesiology  Preprocedure Note       Name:  Emeka Miller   Age:  70 y.o.  :  1945                                          MRN:  [id number]         Date:  2020      Surgeon: Pratibha Jaramillo):  Heloise Peabody, MD    Procedure: Procedure(s):  EGD ESOPHAGOGASTRODUODENOSCOPY    Medications prior to admission:   Prior to Admission medications    Medication Sig Start Date End Date Taking? Authorizing Provider   pantoprazole (PROTONIX) 40 MG tablet Take 1 tablet by mouth daily 20  Yes Heloise Peabody, MD   clonazePAM (KLONOPIN) 0.5 MG tablet Take 1 tablet by mouth 2 times daily for 30 days. 10/13/20 11/12/20 Yes Marissa Nielsen DO   fluticasone (FLONASE) 50 MCG/ACT nasal spray 2 sprays by Nasal route daily for 10 days 11/15/19 11/12/20 Yes Jose Alejandro Gerardo MD   lisinopril (PRINIVIL;ZESTRIL) 10 MG tablet Take 1 tablet by mouth daily 10/5/20   Marissa Nielsen DO   omeprazole (PRILOSEC) 20 MG delayed release capsule Take 1 capsule by mouth 2 times daily (before meals) 6/12/20 10/5/20  Marissa Nielsen DO   clonazePAM (KLONOPIN) 0.5 MG tablet Take 1 tablet by mouth 2 times daily for 30 days.  6/12/20 10/5/20  Marissa Nielsen DO       Current medications:    Current Facility-Administered Medications   Medication Dose Route Frequency Provider Last Rate Last Dose    [START ON 2020] lactated ringers infusion   Intravenous Continuous Nader Johnson  mL/hr at 20 0759      sodium chloride flush 0.9 % injection 10 mL  10 mL Intravenous 2 times per day Ludy Hung MD        sodium chloride flush 0.9 % injection 10 mL  10 mL Intravenous PRN Ludy Hung MD        lidocaine PF 1 % injection 1 mL  1 mL Intradermal Once PRN Ludy Hung MD         Facility-Administered Medications Ordered in Other Encounters   Medication Dose Route Frequency Provider Last Rate Last Dose    propofol injection    PRN [place] SisJUSTIN mcghee - CRNA   40 mg at 20 0930    lidocaine PF 2 % Leonie Hu MD at [address] VITRECTOMY Right 2014       Social History:    Social History     Tobacco Use    Smoking status: Former Smoker     Packs/day: 1.00     Years: 20.00     Pack years: 20.00     Last attempt to quit: 10/31/1994     Years since quittin.0    Smokeless tobacco: Never Used   Substance Use Topics    Alcohol use: Yes     Comment: 2 DRINKS A WEEK                                Counseling given: Not Answered      Vital Signs (Current):   Vitals:    20 0749 20 0753   BP: (!) 162/86    Pulse: 78    Resp: 16    Temp: 96.9 °F (36.1 °C)    SpO2: 95%    Weight:  189 lb (85.7 kg)   Height:  5' 1\" (1.549 m)                                              BP Readings from Last 3 Encounters:   20 (!) 162/86   10/05/20 (!) 159/81   20 117/61       NPO Status: Time of last liquid consumption:                         Time of last solid consumption:                         Date of last liquid consumption: 20                        Date of last solid food consumption: 20    BMI:   Wt Readings from Last 3 Encounters:   20 189 lb (85.7 kg)   10/05/20 189 lb (85.7 kg)   20 189 lb 11.2 oz (86 kg)     Body mass index is 35.71 kg/m².     CBC:   Lab Results   Component Value Date    WBC 8.5 2020    RBC 4.53 2020    RBC 3.91 2011    HGB 13.5 2020    HCT 40.8 2020    MCV 90.1 2020    RDW 14.6 2020     2020     2011       CMP:   Lab Results   Component Value Date     2020    K 4.2 2020     2020    CO2 22 2020    BUN 18 2020    CREATININE 0.65 2020    GFRAA >60 2020    LABGLOM >60 2020    GLUCOSE 93 2020    GLUCOSE 75 2011    PROT 7.0 2020    CALCIUM 9.2 2020    BILITOT 0.30 2020    ALKPHOS 79 2020    AST 17 2020    ALT 15 2020 POC Tests: No results for input(s): POCGLU, POCNA, POCK, POCCL, POCBUN, POCHEMO, POCHCT in the last 72 hours. Coags: No results found for: PROTIME, INR, APTT    HCG (If Applicable): No results found for: PREGTESTUR, PREGSERUM, HCG, HCGQUANT     ABGs:   Lab Results   Component Value Date    PHART 7.47 12/15/2011    PO2ART 64 12/15/2011    LAG3FCR 28 12/15/2011    EOM1GWT 20.1 12/15/2011    S7GRXAGG 96.9 12/15/2011        Type & Screen (If Applicable):  No results found for: LABABO, LABRH    Drug/Infectious Status (If Applicable):  No results found for: HIV, HEPCAB    COVID-19 Screening (If Applicable):   Lab Results   Component Value Date    COVID19 Not Detected 11/08/2020         Anesthesia Evaluation  Patient summary reviewed and Nursing notes reviewed no history of anesthetic complications:   Airway: Mallampati: II  TM distance: >3 FB   Neck ROM: full  Mouth opening: > = 3 FB Dental: normal exam         Pulmonary:normal exam    (+) COPD:  sleep apnea:                             Cardiovascular:  Exercise tolerance: no interval change,   (+) hypertension:,     (-) past MI, CAD and CABG/stent        Rate: normal                    Neuro/Psych:   (+) headaches:, psychiatric history:            GI/Hepatic/Renal:   (+) GERD:,      (-) hepatitis       Endo/Other:        (-) diabetes mellitus               Abdominal:           Vascular:                                        Anesthesia Plan      MAC and general     ASA 3       Induction: intravenous. Anesthetic plan and risks discussed with patient. Plan discussed with CRNA.     Attending anesthesiologist reviewed and agrees with Pre Eval content              Soannie Acosta DO   11/12/2020

## 2020-11-12 NOTE — H&P
History and Physical Service   [facility] [place] - [facility]PATIENT    HISTORY AND PHYSICAL EXAMINATION            Date of Evaluation: 11/12/2020  Patient name:  Brad Andrade  MRN:   [id number]  YOB: 1945  PCP:    Chayito Palafox MD    History Obtained From:     Patient, medical records    History of Present Illness: This is Brad Andrade a 70 y.o. female who presents today for an EGD by Dr. Nhung Jurado for GERD, abdominal pain. Patient c/o RUQ pain that goes around to her back. Went to [facility] ED 9/8/20 CT Chest/Abdomen showed  Colonic diverticulosis predominantly in sigmoid with no other abdominal changes. Patient was referred to a PCP for f/u. She had a new patient visit with Dr Kiana Allen 9/10/20 (refer to note in Epic). GB US 10/9/20 showed \"Echogenic liver compatible with hepatic steatosis. No acute sonographic abnormality in the right upper quadrant. Multiple nonobstructive right renal calculi. \" Patient recommended to have EGD. Additionally she has hypertension that was not treated, SOB with exertion and advised ECHO which was completed on 11/7/20 with EF 65% (see results below). Patient changed to new PCP Dr Sarai Hamilton. Patient had a screening colonoscopy by Dr Kade Mg 4+ months ago. She c/o acid reflux, sitting with head elevated at night due to her symptoms. She spoke to Dr Kade Mg and was scheduled for an  EGD today.   She denies unintentional weight loss, n/v/d, bloody tarry stools,  abdominal pain, dysuria, hematuria or flank pain today     Past Medical History:     Past Medical History:   Diagnosis Date    Arthritis 2012    GENERALIZED    Fibromyalgia 2012    GERD (gastroesophageal reflux disease) DX PRIOR TO 2004    ON RX     GERD (gastroesophageal reflux disease)     Hyperlipidemia     NO RX ALLERGIC TO STATINS    Hypertension     Kidney stone 2012 & 2013    X 2-SEVERAL STONES EACH TIME, PASSED ON OWN    Pneumonia 2012    Recurrent UTI     Sleep apnea 2012 HAS MACHINE BUT DOES NOT USE    Vision abnormalities 2014    RT DISTORTED, LT DECREASED    Wears glasses         Past Surgical History:     Past Surgical History:   Procedure Laterality Date    APPENDECTOMY  [address] SECTION, CLASSIC  1964, 0, & 1586    COLONOSCOPY      COLONOSCOPY N/A 10/31/2019    COLONOSCOPY WITH BIOPSY performed by Guera Salcedo MD at [address] VITRECTOMY Right 11/6/2014        Medications Prior to Admission:     Prior to Admission medications    Medication Sig Start Date End Date Taking? Authorizing Provider   clonazePAM (KLONOPIN) 0.5 MG tablet Take 1 tablet by mouth 2 times daily for 30 days. 10/13/20 11/12/20  Marissa Nielsen,    lisinopril (PRINIVIL;ZESTRIL) 10 MG tablet Take 1 tablet by mouth daily 10/5/20   Marissa Nielsen, DO   omeprazole (PRILOSEC) 20 MG delayed release capsule Take 1 capsule by mouth 2 times daily (before meals) 6/12/20 10/5/20  Marissa Nielsen, DO   clonazePAM (KLONOPIN) 0.5 MG tablet Take 1 tablet by mouth 2 times daily for 30 days. 6/12/20 10/5/20  Marissa Nielsen DO   predniSONE (DELTASONE) 20 MG tablet Take 1 tablet by mouth daily Take 3 tabs po x 2 days, 2 tabs po x 3 days, 1 tab po x 2 days, half tab po x 2 days  Patient not taking: Reported on 7/2/2020 3/23/20   JUSTIN Cuellar CNP   doxycycline hyclate (VIBRA-TABS) 100 MG tablet Take 1 tablet by mouth 2 times daily  Patient not taking: Reported on 9/10/2020 3/23/20   JUSTIN Cuellar CNP   fluticasone [facility]) 50 MCG/ACT nasal spray 2 sprays by Nasal route daily for 10 days 11/15/19 11/25/19  Vineet Ramon MD   Na Sulfate-K Sulfate-Mg Sulf 17.5-3.13-1.6 GM/177ML SOLN Use as directed in your patient instructions. Patient not taking: Reported on 9/10/2020 10/18/19   Guera Salcedo MD        Allergies:     Cefdinir; Morphine; Avelox [moxifloxacin];  Ciprofloxacin; Quinolones; Statins; and Sulfa antibiotics    Social History: Tobacco:    reports that she quit smoking about 26 years ago. She has a 20.00 pack-year smoking history. She has never used smokeless tobacco.  Alcohol:      reports current alcohol use. Drug Use:  reports no history of drug use. Family History:     Family History   Problem Relation Age of Onset    Cancer Maternal Grandmother         UNKNOWN       Review of Systems:     Positive and Negative as described in HPI. CONSTITUTIONAL:  negative for fevers, chills, sweats, fatigue, weight loss  HEENT:  negative for vision, hearing changes, runny nose, throat pain  RESPIRATORY:  positive shortness of breath, cough, congestion, wheezing. CARDIOVASCULAR:  negative for chest pain, palpitations. GASTROINTESTINAL: sleeps with head up due to acid reflux  negative for nausea, vomiting, diarrhea, constipation, change in bowel habits, abdominal pain   GENITOURINARY: past hx kidney stones negative for difficulty of urination, burning with urination, frequency   INTEGUMENT:  negative for rash, skin lesions, easy bruising   HEMATOLOGIC/LYMPHATIC:  negative for swelling/edema   ALLERGIC/IMMUNOLOGIC:  negative for urticaria , itching  ENDOCRINE:  negative increase in drinking, increase in urination, hot or cold intolerance  MUSCULOSKELETAL:  negative joint pains, muscle aches, swelling of joints  NEUROLOGICAL:  negative for headaches, dizziness, lightheadedness, numbness, pain, tingling extremities  BEHAVIOR/PSYCH:  negative for depression, anxiety    Physical Exam:   BP (!) 162/86   Pulse 78   Temp 96.9 °F (36.1 °C)   Resp 16   LMP 10/31/1975 (Approximate)   SpO2 95%   Patient's last menstrual period was 10/31/1975 (approximate). No obstetric history on file. No results for input(s): POCGLU in the last 72 hours. General Appearance: obese female  alert, well appearing, and in no acute distress  Mental status: oriented to person, place, and time with normal affect  Head:  normocephalic, atraumatic.   Eye: no icterus, redness, pupils equal and reactive, extraocular eye movements intact, conjunctiva clear  Ear: normal external ear, no discharge, hearing intact  Nose:  no drainage noted  Mouth: mucous membranes moist  Neck: supple, no carotid bruits, thyroid not palpable  Lungs: Bilateral equal air entry with diminished breath sounds, clear to ausculation, no wheezing, rales or rhonchi, normal effort  No CVA tenderness  Cardiovascular: HR 78 normal rate, regular rhythm, no murmur, gallop, rub. Abdomen: Soft, nontender, nondistended, normal bowel sounds, no hepatomegaly or splenomegaly  Neurologic: There are no new focal motor or sensory deficits, normal muscle tone and bulk, no abnormal sensation, normal speech, cranial nerves II through XII grossly intact  Skin: No gross lesions, rashes, bruising or bleeding on exposed skin area  Extremities:  peripheral pulses palpable, no pedal edema or calf pain with palpation  Psych: normal affect     Investigations:      Laboratory Testing:  No results found for this or any previous visit (from the past 24 hour(s)). No results for input(s): HGB, HCT, WBC, MCV, PLATELET, NA, K, CL, CO2, BUN, CREATININE, GLUCOSE, INR, PROTIME, APTT, AST, ALT, LABALBU, HCG in the last 720 hours. Recent Labs     11/08/20  1300   COVID19 Not Detected     Imaging/Diagnostics:    Procedure Date  Date: 10/07/2020 Start: 09:12 AM    Study Location: [facility]  Technical Quality: Adequate visualization    Indications:Shortness of breath. History / Tech. Comments:  Procedure explained to patient. Patient Status: Outpatient    Height: 62 inches Weight: 189 pounds BSA: 1.87 m^2 BMI: 34.57 kg/m^2    Allergies  - Morphine. CONCLUSIONS    Summary  Left ventricle is normal in size  Global left ventricular systolic function is normal  Estimated ejection fraction is 65 % . No significant valvular regurgitation or stenosis seen. No pericardial effusion seen. Normal aortic root dimension.

## 2020-11-23 NOTE — TELEPHONE ENCOUNTER
Pamela Boyd called the office stating she had a scope done on 11/13/20 and has been doing a lot of throwing up and the reflux medication is not working at all.   Please call back at [phone]

## 2020-12-01 RX ORDER — OMEPRAZOLE 40 MG/1
40 CAPSULE, DELAYED RELEASE ORAL
Qty: 30 CAPSULE | Refills: 1 | Status: SHIPPED | OUTPATIENT
Start: 2020-12-01 | End: 2021-01-27

## 2020-12-01 NOTE — TELEPHONE ENCOUNTER
Protonix is not working well and would like to try Omeprazole as she is taking her husbands and it is helping. He is taking 20 MG so we sent 40 MG to the pharmacy to try and a follow   Up appointment made.

## 2020-12-17 ENCOUNTER — TELEPHONE (OUTPATIENT)
Dept: GASTROENTEROLOGY | Age: 75
End: 2020-12-17

## 2020-12-17 ENCOUNTER — OFFICE VISIT (OUTPATIENT)
Dept: GASTROENTEROLOGY | Age: 75
End: 2020-12-17
Payer: MEDICARE

## 2020-12-17 VITALS — WEIGHT: 185 LBS | TEMPERATURE: 96.4 F | BODY MASS INDEX: 34.96 KG/M2

## 2020-12-17 PROCEDURE — 1123F ACP DISCUSS/DSCN MKR DOCD: CPT | Performed by: INTERNAL MEDICINE

## 2020-12-17 PROCEDURE — 3017F COLORECTAL CA SCREEN DOC REV: CPT | Performed by: INTERNAL MEDICINE

## 2020-12-17 PROCEDURE — 99213 OFFICE O/P EST LOW 20 MIN: CPT | Performed by: INTERNAL MEDICINE

## 2020-12-17 PROCEDURE — G8427 DOCREV CUR MEDS BY ELIG CLIN: HCPCS | Performed by: INTERNAL MEDICINE

## 2020-12-17 PROCEDURE — 1090F PRES/ABSN URINE INCON ASSESS: CPT | Performed by: INTERNAL MEDICINE

## 2020-12-17 PROCEDURE — 4040F PNEUMOC VAC/ADMIN/RCVD: CPT | Performed by: INTERNAL MEDICINE

## 2020-12-17 PROCEDURE — G8484 FLU IMMUNIZE NO ADMIN: HCPCS | Performed by: INTERNAL MEDICINE

## 2020-12-17 PROCEDURE — G8400 PT W/DXA NO RESULTS DOC: HCPCS | Performed by: INTERNAL MEDICINE

## 2020-12-17 PROCEDURE — 1036F TOBACCO NON-USER: CPT | Performed by: INTERNAL MEDICINE

## 2020-12-17 PROCEDURE — G8417 CALC BMI ABV UP PARAM F/U: HCPCS | Performed by: INTERNAL MEDICINE

## 2020-12-17 ASSESSMENT — ENCOUNTER SYMPTOMS
RECTAL PAIN: 0
TROUBLE SWALLOWING: 0
BLOOD IN STOOL: 0
CHOKING: 0
ABDOMINAL DISTENTION: 0
WHEEZING: 0
SORE THROAT: 0
ANAL BLEEDING: 0
ABDOMINAL PAIN: 0
BACK PAIN: 0
NAUSEA: 0
DIARRHEA: 1
CONSTIPATION: 0
COUGH: 1
SINUS PRESSURE: 0
VOMITING: 0
VOICE CHANGE: 0

## 2020-12-17 NOTE — TELEPHONE ENCOUNTER
Patient called for address to [facility]. The following given [address]. Writer thanked and call ended.

## 2020-12-17 NOTE — PROGRESS NOTES
GI CLINIC FOLLOW UP    INTERVAL HISTORY:   No referring provider defined for this encounter. Chief Complaint   Patient presents with    EGD     Patient had EGD done. She has a Hiatal hernia.  Diarrhea     She recently had diarrhea 2 weeks ago that was so terrible and she couldn't even stand up without it coming out.  Gastroesophageal Reflux     She als says she has been having trouble with acid reflux on and off for a while and throwing up They th3 Omeprazole was increased to 40 MG and she feels better. HISTORY OF PRESENT ILLNESS: Leticia Cole is a 70 y.o. female with GERD. She reports significant heartburns after her EGD. She has been doing much better on Protonix. No blood in stool or melena. She had transient diarrhea for few days. Currently occasional heartburns for which she takes Tums. Past Medical,Family, and Social History reviewed and does not contribute to the patient presentingcondition. Patient's PMH/PSH,SH,PSYCH Hx, MEDs, ALLERGIES, and ROS were all reviewed and updated in the appropriate sections.     PAST MEDICAL HISTORY:  Past Medical History:   Diagnosis Date    Arthritis 2012    GENERALIZED    Fibromyalgia 2012    GERD (gastroesophageal reflux disease) DX PRIOR TO 2004    ON RX     GERD (gastroesophageal reflux disease)     Hyperlipidemia     NO RX ALLERGIC TO STATINS    Hypertension     Kidney stone 2012 & 2013    X 2-SEVERAL STONES EACH TIME, PASSED ON OWN    Pneumonia 2012    Recurrent UTI     Sleep apnea 2012    HAS MACHINE BUT DOES NOT USE    Vision abnormalities 2014    RT DISTORTED, LT DECREASED    Wears glasses        Past Surgical History:   Procedure Laterality Date    APPENDECTOMY  [address] SECTION, CLASSIC  1964, 0, & 65    COLONOSCOPY      COLONOSCOPY N/A 10/31/2019    COLONOSCOPY WITH BIOPSY performed by Prince Macario MD at [facility]  UPPER GASTROINTESTINAL ENDOSCOPY N/A 11/12/2020    EGD ESOPHAGOGASTRODUODENOSCOPY performed by Benjamin Sparks MD at [facility] Right 11/6/2014       CURRENT MEDICATIONS:    Current Outpatient Medications:     omeprazole (PRILOSEC) 40 MG delayed release capsule, Take 1 capsule by mouth every morning (before breakfast), Disp: 30 capsule, Rfl: 1    pantoprazole (PROTONIX) 40 MG tablet, Take 1 tablet by mouth daily, Disp: 30 tablet, Rfl: 5    clonazePAM (KLONOPIN) 0.5 MG tablet, Take 1 tablet by mouth 2 times daily for 30 days. , Disp: 60 tablet, Rfl: 0    lisinopril (PRINIVIL;ZESTRIL) 10 MG tablet, Take 1 tablet by mouth daily, Disp: 30 tablet, Rfl: 0    omeprazole (PRILOSEC) 20 MG delayed release capsule, Take 1 capsule by mouth 2 times daily (before meals), Disp: 58 capsule, Rfl: 0    clonazePAM (KLONOPIN) 0.5 MG tablet, Take 1 tablet by mouth 2 times daily for 30 days. , Disp: 60 tablet, Rfl: 0    fluticasone (FLONASE) 50 MCG/ACT nasal spray, 2 sprays by Nasal route daily for 10 days, Disp: 1 Bottle, Rfl: 0    ALLERGIES:   Allergies   Allergen Reactions    Cefdinir Itching    Morphine Nausea And Vomiting    Avelox [Moxifloxacin]      UNSURE OF REACTION      Ciprofloxacin Itching    Quinolones      UNSURE OF REACTION      Statins Other (See Comments)     MUSCLE WEAKNESS      Sulfa Antibiotics Itching       FAMILY HISTORY:       Problem Relation Age of Onset    Cancer Maternal Grandmother         UNKNOWN         SOCIAL HISTORY:   Social History     Socioeconomic History    Marital status:      Spouse name: Not on file    Number of children: Not on file    Years of education: Not on file    Highest education level: Not on file   Occupational History    Not on file   Social Needs    Financial resource strain: Not on file    Food insecurity     Worry: Not on file     Inability: Not on file    Transportation needs     Medical: Not on file     Non-medical: Not on file Tobacco Use    Smoking status: Former Smoker     Packs/day: 1.00     Years: 20.00     Pack years: 20.00     Quit date: 10/31/1994     Years since quittin.1    Smokeless tobacco: Never Used   Substance and Sexual Activity    Alcohol use: Yes     Comment: 2 DRINKS A WEEK    Drug use: No    Sexual activity: Not on file   Lifestyle    Physical activity     Days per week: Not on file     Minutes per session: Not on file    Stress: Not on file   Relationships    Social connections     Talks on phone: Not on file     Gets together: Not on file     Attends Jehovah's witness service: Not on file     Active member of club or organization: Not on file     Attends meetings of clubs or organizations: Not on file     Relationship status: Not on file    Intimate partner violence     Fear of current or ex partner: Not on file     Emotionally abused: Not on file     Physically abused: Not on file     Forced sexual activity: Not on file   Other Topics Concern    Not on file   Social History Narrative    Not on file       REVIEW OF SYSTEMS: A 12-point review of systemswas obtained and pertinent positives and negatives were enumerated above in the history of present illness. All other reviewed systems / symptoms were negative. Review of Systems   Constitutional: Positive for fatigue. Negative for appetite change and unexpected weight change. HENT: Negative. Negative for dental problem, postnasal drip, sinus pressure, sore throat, trouble swallowing and voice change. Eyes: Positive for visual disturbance. Respiratory: Positive for cough. Negative for choking and wheezing. Cardiovascular: Negative. Negative for chest pain, palpitations and leg swelling. Gastrointestinal: Positive for diarrhea. Negative for abdominal distention, abdominal pain, anal bleeding, blood in stool, constipation, nausea, rectal pain and vomiting. Endocrine: Negative. Genitourinary: Negative. Negative for difficulty urinating. Musculoskeletal: Negative. Negative for arthralgias, back pain, gait problem and myalgias. Skin: Negative. Allergic/Immunologic: Negative for environmental allergies and food allergies. Neurological: Negative. Negative for dizziness, weakness, light-headedness, numbness and headaches. Hematological: Negative. Does not bruise/bleed easily. Psychiatric/Behavioral: Negative. Negative for sleep disturbance. The patient is not nervous/anxious. LABORATORY DATA: Reviewed  Lab Results   Component Value Date    WBC 8.5 09/08/2020    HGB 13.5 09/08/2020    HCT 40.8 09/08/2020    MCV 90.1 09/08/2020     09/08/2020     09/08/2020    K 4.2 09/08/2020     09/08/2020    CO2 22 09/08/2020    BUN 18 09/08/2020    CREATININE 0.65 09/08/2020    LABPROT 6.5 12/10/2011    LABALBU 4.0 09/08/2020    BILITOT 0.30 09/08/2020    ALKPHOS 79 09/08/2020    AST 17 09/08/2020    ALT 15 09/08/2020         Lab Results   Component Value Date    RBC 4.53 09/08/2020    HGB 13.5 09/08/2020    MCV 90.1 09/08/2020    MCH 29.8 09/08/2020    MCHC 33.1 09/08/2020    RDW 14.6 (H) 09/08/2020    MPV 10.4 09/08/2020    BASOPCT 1 09/08/2020    LYMPHSABS 2.64 09/08/2020    MONOSABS 0.58 09/08/2020    NEUTROABS 5.02 09/08/2020    EOSABS 0.18 09/08/2020    BASOSABS 0.08 09/08/2020         DIAGNOSTIC TESTING:     No results found. PHYSICAL EXAMINATION: Vital signs reviewed per the nursing documentation. LMP 10/31/1975 (Approximate)   There is no height or weight on file to calculate BMI. Physical Exam      I personally reviewed the nurse's notes and documentation and I agree with her notes. General: alert, appears stated age and cooperative Psych: Normal. and Alert and oriented, appropriate affect. . Normal affect. Mentation normal  HEENT: PERRLA. Clear conjunctivae and sclerae. Moist oral mucosae, no lesions or ulcers. The neck is supple, without lymphadenopathy or jugular venous distension. No masses. Normal thyroid. Cardiovascular: S1 S2 RRR no rubs or murmurs. Pulmonary: clear BL. No accessory muscle usage. Abdominal Exam: Soft, NT ND, no hepato or spleno megaly, +BS, no ascites. IMPRESSION: Ms. Lary Chen is a 70 y.o. female with GERD. Small sliding hiatal hernia. Change timing of Protonix to be taken half an hour before the major meal of the day. Follow-up in 6 months. Thank you for allowing me to participate in the care of Ms. Lary Chen. For any further questions please do not hesitate to contact me. I have reviewed and agree with the ROS entered by the MA/LPN. Note is dictated utilizing voice recognition software. Unfortunately this leads to occasional typographical errors. Please contact our office if you have any questions.     Panchito Kelly MD  [facility]  O: #[phone]

## 2021-01-17 ENCOUNTER — APPOINTMENT (OUTPATIENT)
Dept: GENERAL RADIOLOGY | Age: 76
End: 2021-01-17
Payer: MEDICARE

## 2021-01-17 ENCOUNTER — HOSPITAL ENCOUNTER (OUTPATIENT)
Age: 76
Setting detail: OBSERVATION
Discharge: HOME | End: 2021-01-18
Attending: EMERGENCY MEDICINE | Admitting: FAMILY MEDICINE
Payer: MEDICARE

## 2021-01-17 DIAGNOSIS — M25.561 ACUTE PAIN OF RIGHT KNEE: Primary | ICD-10-CM

## 2021-01-17 DIAGNOSIS — R26.2 INABILITY TO AMBULATE DUE TO RIGHT KNEE: ICD-10-CM

## 2021-01-17 DIAGNOSIS — S83.281A TEAR OF LATERAL MENISCUS OF RIGHT KNEE, CURRENT, UNSPECIFIED TEAR TYPE, INITIAL ENCOUNTER: ICD-10-CM

## 2021-01-17 PROBLEM — M81.0 AGE-RELATED OSTEOPOROSIS WITHOUT CURRENT PATHOLOGICAL FRACTURE: Status: ACTIVE | Noted: 2021-01-17

## 2021-01-17 PROBLEM — M25.569 KNEE PAIN: Status: ACTIVE | Noted: 2021-01-17

## 2021-01-17 LAB
ABSOLUTE EOS #: 0.13 K/UL (ref 0–0.44)
ABSOLUTE IMMATURE GRANULOCYTE: 0.04 K/UL (ref 0–0.3)
ABSOLUTE LYMPH #: 3.51 K/UL (ref 1.1–3.7)
ABSOLUTE MONO #: 0.59 K/UL (ref 0.1–1.2)
ANION GAP SERPL CALCULATED.3IONS-SCNC: 11 MMOL/L (ref 9–17)
BASOPHILS # BLD: 1 % (ref 0–2)
BASOPHILS ABSOLUTE: 0.07 K/UL (ref 0–0.2)
BUN BLDV-MCNC: 20 MG/DL (ref 8–23)
BUN/CREAT BLD: 33 (ref 9–20)
CALCIUM SERPL-MCNC: 9.1 MG/DL (ref 8.6–10.4)
CHLORIDE BLD-SCNC: 105 MMOL/L (ref 98–107)
CO2: 22 MMOL/L (ref 20–31)
CREAT SERPL-MCNC: 0.61 MG/DL (ref 0.5–0.9)
DIFFERENTIAL TYPE: NORMAL
EOSINOPHILS RELATIVE PERCENT: 1 % (ref 1–4)
GFR AFRICAN AMERICAN: >60 ML/MIN
GFR NON-AFRICAN AMERICAN: >60 ML/MIN
GFR SERPL CREATININE-BSD FRML MDRD: ABNORMAL ML/MIN/{1.73_M2}
GFR SERPL CREATININE-BSD FRML MDRD: ABNORMAL ML/MIN/{1.73_M2}
GLUCOSE BLD-MCNC: 124 MG/DL (ref 70–99)
HCT VFR BLD CALC: 39.3 % (ref 36.3–47.1)
HEMOGLOBIN: 12.8 G/DL (ref 11.9–15.1)
IMMATURE GRANULOCYTES: 0 %
LYMPHOCYTES # BLD: 36 % (ref 24–43)
MCH RBC QN AUTO: 29.2 PG (ref 25.2–33.5)
MCHC RBC AUTO-ENTMCNC: 32.6 G/DL (ref 28.4–34.8)
MCV RBC AUTO: 89.5 FL (ref 82.6–102.9)
MONOCYTES # BLD: 6 % (ref 3–12)
NRBC AUTOMATED: 0 PER 100 WBC
PDW BLD-RTO: 13.8 % (ref 11.8–14.4)
PLATELET # BLD: 345 K/UL (ref 138–453)
PLATELET ESTIMATE: NORMAL
PMV BLD AUTO: 10.6 FL (ref 8.1–13.5)
POTASSIUM SERPL-SCNC: 4 MMOL/L (ref 3.7–5.3)
RBC # BLD: 4.39 M/UL (ref 3.95–5.11)
RBC # BLD: NORMAL 10*6/UL
SEG NEUTROPHILS: 56 % (ref 36–65)
SEGMENTED NEUTROPHILS ABSOLUTE COUNT: 5.37 K/UL (ref 1.5–8.1)
SODIUM BLD-SCNC: 138 MMOL/L (ref 135–144)
WBC # BLD: 9.7 K/UL (ref 3.5–11.3)
WBC # BLD: NORMAL 10*3/UL

## 2021-01-17 PROCEDURE — 6370000000 HC RX 637 (ALT 250 FOR IP): Performed by: FAMILY MEDICINE

## 2021-01-17 PROCEDURE — 6360000002 HC RX W HCPCS: Performed by: EMERGENCY MEDICINE

## 2021-01-17 PROCEDURE — 99219 PR INITIAL OBSERVATION CARE/DAY 50 MINUTES: CPT | Performed by: FAMILY MEDICINE

## 2021-01-17 PROCEDURE — 96372 THER/PROPH/DIAG INJ SC/IM: CPT

## 2021-01-17 PROCEDURE — 73562 X-RAY EXAM OF KNEE 3: CPT

## 2021-01-17 PROCEDURE — 96374 THER/PROPH/DIAG INJ IV PUSH: CPT

## 2021-01-17 PROCEDURE — 73552 X-RAY EXAM OF FEMUR 2/>: CPT

## 2021-01-17 PROCEDURE — 6360000002 HC RX W HCPCS: Performed by: FAMILY MEDICINE

## 2021-01-17 PROCEDURE — 2580000003 HC RX 258: Performed by: FAMILY MEDICINE

## 2021-01-17 PROCEDURE — 6370000000 HC RX 637 (ALT 250 FOR IP): Performed by: EMERGENCY MEDICINE

## 2021-01-17 PROCEDURE — 85025 COMPLETE CBC W/AUTO DIFF WBC: CPT

## 2021-01-17 PROCEDURE — 80048 BASIC METABOLIC PNL TOTAL CA: CPT

## 2021-01-17 PROCEDURE — 99284 EMERGENCY DEPT VISIT MOD MDM: CPT

## 2021-01-17 PROCEDURE — 73501 X-RAY EXAM HIP UNI 1 VIEW: CPT

## 2021-01-17 PROCEDURE — G0378 HOSPITAL OBSERVATION PER HR: HCPCS

## 2021-01-17 RX ORDER — POLYETHYLENE GLYCOL 3350 17 G/17G
17 POWDER, FOR SOLUTION ORAL DAILY PRN
Status: DISCONTINUED | OUTPATIENT
Start: 2021-01-17 | End: 2021-01-18 | Stop reason: HOSPADM

## 2021-01-17 RX ORDER — MORPHINE SULFATE 2 MG/ML
2 INJECTION, SOLUTION INTRAMUSCULAR; INTRAVENOUS ONCE
Status: DISCONTINUED | OUTPATIENT
Start: 2021-01-17 | End: 2021-01-17

## 2021-01-17 RX ORDER — ONDANSETRON 4 MG/1
4 TABLET, ORALLY DISINTEGRATING ORAL ONCE
Status: COMPLETED | OUTPATIENT
Start: 2021-01-17 | End: 2021-01-17

## 2021-01-17 RX ORDER — OXYCODONE HYDROCHLORIDE AND ACETAMINOPHEN 5; 325 MG/1; MG/1
1 TABLET ORAL EVERY 4 HOURS PRN
Status: DISCONTINUED | OUTPATIENT
Start: 2021-01-17 | End: 2021-01-18 | Stop reason: HOSPADM

## 2021-01-17 RX ORDER — NICOTINE 21 MG/24HR
1 PATCH, TRANSDERMAL 24 HOURS TRANSDERMAL DAILY PRN
Status: DISCONTINUED | OUTPATIENT
Start: 2021-01-17 | End: 2021-01-18 | Stop reason: HOSPADM

## 2021-01-17 RX ORDER — SODIUM CHLORIDE 0.9 % (FLUSH) 0.9 %
10 SYRINGE (ML) INJECTION EVERY 12 HOURS SCHEDULED
Status: DISCONTINUED | OUTPATIENT
Start: 2021-01-17 | End: 2021-01-18 | Stop reason: HOSPADM

## 2021-01-17 RX ORDER — MAGNESIUM SULFATE 1 G/100ML
1 INJECTION INTRAVENOUS PRN
Status: DISCONTINUED | OUTPATIENT
Start: 2021-01-17 | End: 2021-01-18 | Stop reason: HOSPADM

## 2021-01-17 RX ORDER — ONDANSETRON 2 MG/ML
4 INJECTION INTRAMUSCULAR; INTRAVENOUS EVERY 6 HOURS PRN
Status: DISCONTINUED | OUTPATIENT
Start: 2021-01-17 | End: 2021-01-18 | Stop reason: HOSPADM

## 2021-01-17 RX ORDER — LISINOPRIL 10 MG/1
10 TABLET ORAL DAILY
Status: DISCONTINUED | OUTPATIENT
Start: 2021-01-17 | End: 2021-01-18 | Stop reason: HOSPADM

## 2021-01-17 RX ORDER — ONDANSETRON 2 MG/ML
4 INJECTION INTRAMUSCULAR; INTRAVENOUS ONCE
Status: DISCONTINUED | OUTPATIENT
Start: 2021-01-17 | End: 2021-01-17

## 2021-01-17 RX ORDER — OMEPRAZOLE 20 MG/1
40 CAPSULE, DELAYED RELEASE ORAL PRN
Status: ON HOLD | COMMUNITY
Start: 2020-06-12 | End: 2021-01-18 | Stop reason: HOSPADM

## 2021-01-17 RX ORDER — PROMETHAZINE HYDROCHLORIDE 12.5 MG/1
12.5 TABLET ORAL EVERY 6 HOURS PRN
Status: DISCONTINUED | OUTPATIENT
Start: 2021-01-17 | End: 2021-01-18 | Stop reason: HOSPADM

## 2021-01-17 RX ORDER — ONDANSETRON 2 MG/ML
4 INJECTION INTRAMUSCULAR; INTRAVENOUS ONCE
Status: COMPLETED | OUTPATIENT
Start: 2021-01-17 | End: 2021-01-17

## 2021-01-17 RX ORDER — ACETAMINOPHEN 325 MG/1
650 TABLET ORAL EVERY 6 HOURS PRN
Status: DISCONTINUED | OUTPATIENT
Start: 2021-01-17 | End: 2021-01-18 | Stop reason: HOSPADM

## 2021-01-17 RX ORDER — PROMETHAZINE HYDROCHLORIDE 25 MG/ML
6.25 INJECTION, SOLUTION INTRAMUSCULAR; INTRAVENOUS ONCE
Status: COMPLETED | OUTPATIENT
Start: 2021-01-17 | End: 2021-01-17

## 2021-01-17 RX ORDER — ACETAMINOPHEN 650 MG/1
650 SUPPOSITORY RECTAL EVERY 6 HOURS PRN
Status: DISCONTINUED | OUTPATIENT
Start: 2021-01-17 | End: 2021-01-18 | Stop reason: HOSPADM

## 2021-01-17 RX ORDER — LISINOPRIL 10 MG/1
10 TABLET ORAL DAILY
COMMUNITY
Start: 2020-11-07 | End: 2021-01-17

## 2021-01-17 RX ORDER — POTASSIUM CHLORIDE 20 MEQ/1
40 TABLET, EXTENDED RELEASE ORAL PRN
Status: DISCONTINUED | OUTPATIENT
Start: 2021-01-17 | End: 2021-01-18 | Stop reason: HOSPADM

## 2021-01-17 RX ORDER — SODIUM CHLORIDE 0.9 % (FLUSH) 0.9 %
10 SYRINGE (ML) INJECTION PRN
Status: DISCONTINUED | OUTPATIENT
Start: 2021-01-17 | End: 2021-01-18 | Stop reason: HOSPADM

## 2021-01-17 RX ORDER — FLUTICASONE PROPIONATE 50 MCG
1 SPRAY, SUSPENSION (ML) NASAL DAILY
COMMUNITY
Start: 2020-10-19 | End: 2021-01-17

## 2021-01-17 RX ORDER — POTASSIUM CHLORIDE 7.45 MG/ML
10 INJECTION INTRAVENOUS PRN
Status: DISCONTINUED | OUTPATIENT
Start: 2021-01-17 | End: 2021-01-18 | Stop reason: HOSPADM

## 2021-01-17 RX ADMIN — ONDANSETRON 4 MG: 2 INJECTION INTRAMUSCULAR; INTRAVENOUS at 17:11

## 2021-01-17 RX ADMIN — Medication 10 ML: at 22:33

## 2021-01-17 RX ADMIN — HYDROMORPHONE HYDROCHLORIDE 0.5 MG: 1 INJECTION, SOLUTION INTRAMUSCULAR; INTRAVENOUS; SUBCUTANEOUS at 15:32

## 2021-01-17 RX ADMIN — ENOXAPARIN SODIUM 40 MG: 100 INJECTION SUBCUTANEOUS at 22:33

## 2021-01-17 RX ADMIN — OXYCODONE AND ACETAMINOPHEN 1 TABLET: 5; 325 TABLET ORAL at 22:44

## 2021-01-17 RX ADMIN — PROMETHAZINE HYDROCHLORIDE 6.25 MG: 25 INJECTION INTRAMUSCULAR; INTRAVENOUS at 18:29

## 2021-01-17 RX ADMIN — ONDANSETRON 4 MG: 4 TABLET, ORALLY DISINTEGRATING ORAL at 15:53

## 2021-01-17 ASSESSMENT — ENCOUNTER SYMPTOMS
FACIAL SWELLING: 0
EYE PAIN: 0
CHEST TIGHTNESS: 0
VOMITING: 0
COUGH: 0
WHEEZING: 0
RHINORRHEA: 0
ABDOMINAL PAIN: 0
DIARRHEA: 0
ABDOMINAL DISTENTION: 0
EYE DISCHARGE: 0
BLOOD IN STOOL: 0
SHORTNESS OF BREATH: 0
BACK PAIN: 0
NAUSEA: 0
CONSTIPATION: 0

## 2021-01-17 ASSESSMENT — PAIN SCALES - GENERAL
PAINLEVEL_OUTOF10: 9
PAINLEVEL_OUTOF10: 9
PAINLEVEL_OUTOF10: 5

## 2021-01-17 ASSESSMENT — PAIN DESCRIPTION - ORIENTATION: ORIENTATION: RIGHT

## 2021-01-17 ASSESSMENT — PAIN DESCRIPTION - ONSET: ONSET: SUDDEN

## 2021-01-17 ASSESSMENT — PAIN DESCRIPTION - DESCRIPTORS: DESCRIPTORS: SHARP

## 2021-01-17 NOTE — ED NOTES
Attempted to ambulate pt. Placed knee immobilizer to R knee. Pt could not tolerate using crutches. Dr John Sanderson notified.       Jaida Perez RN  01/17/21 0564

## 2021-01-17 NOTE — ED PROVIDER NOTES
EMERGENCY DEPARTMENT ENCOUNTER    Pt Name: Chase Cain  MRN: [id number]  [place] 1945  Date of evaluation: 1/17/21  CHIEF COMPLAINT       Chief Complaint   Patient presents with    Knee Pain     R knee \"popped\" while squatting down today     HISTORY OF PRESENT ILLNESS   HPI   Patient is a 79-year-old female who presented to the emergency department via EMS from home secondary to right knee pain status post fall. Patient was in her kitchen squatting under the kitchen sink when she is heard a pop in her right knee she subsequently laid down on the floor of her home was unable to get up EMS was subsequently called. Patient is complaining of pain localized to her right knee 10 out of 10 on the pain scale. She has not been able to walk or bear weight since onset of pain. She had a previous history of injury to her knee, no surgeries on her knee. She does have a history of osteoarthritis. Patient denied lightheadedness, chest pain, shortness of breath prior to fall. REVIEW OF SYSTEMS     Review of Systems   Constitutional: Negative for chills, diaphoresis and fever. HENT: Negative for congestion, ear pain and facial swelling. Eyes: Negative for pain, discharge and visual disturbance. Respiratory: Negative for chest tightness and shortness of breath. Cardiovascular: Negative for chest pain and palpitations. Gastrointestinal: Negative for abdominal distention and abdominal pain. Genitourinary: Negative for difficulty urinating and flank pain. Musculoskeletal: Negative for back pain. Right knee pain   Skin: Negative for wound. Neurological: Negative for dizziness, light-headedness and headaches.      PASTMEDICAL HISTORY     Past Medical History:   Diagnosis Date    Age-related osteoporosis without current pathological fracture     Arthritis 2012    GENERALIZED    Fibromyalgia 2012    GERD (gastroesophageal reflux disease) DX PRIOR TO 2004    ON RX     GERD (gastroesophageal reflux disease)     Hyperlipidemia     NO RX ALLERGIC TO STATINS    Hypertension     Kidney stone 2012 & 2013    X 2-SEVERAL STONES EACH TIME, PASSED ON OWN    Pneumonia     Recurrent UTI     Sleep apnea     HAS MACHINE BUT DOES NOT USE    Vision abnormalities     RT DISTORTED, LT DECREASED    Wears glasses      SURGICAL HISTORY       Past Surgical History:   Procedure Laterality Date    APPENDECTOMY  [address] SECTION, CLASSIC  1964, 0, & 5313    COLONOSCOPY      COLONOSCOPY N/A 10/31/2019    COLONOSCOPY WITH BIOPSY performed by Chris Del Valle MD at [address] [facility] ENDOSCOPY N/A 2020    EGD ESOPHAGOGASTRODUODENOSCOPY performed by Chris Del Valle MD at [address] Right 2014     CURRENT MEDICATIONS       Previous Medications    CLONAZEPAM (KLONOPIN) 0.5 MG TABLET    Take 1 tablet by mouth 2 times daily for 30 days. LISINOPRIL (PRINIVIL;ZESTRIL) 10 MG TABLET    Take 1 tablet by mouth daily    OMEPRAZOLE (PRILOSEC) 20 MG DELAYED RELEASE CAPSULE    Take 40 mg by mouth as needed    OMEPRAZOLE (PRILOSEC) 40 MG DELAYED RELEASE CAPSULE    Take 1 capsule by mouth every morning (before breakfast)     ALLERGIES     is allergic to cefdinir; morphine; avelox [moxifloxacin]; ciprofloxacin; quinolones; statins; and sulfa antibiotics. FAMILY HISTORY     She indicated that her mother is . She indicated that her father is . She indicated that her sister is . She indicated that her maternal grandmother is . She indicated that her maternal grandfather is . She indicated that her paternal grandmother is . She indicated that her paternal grandfather is .      SOCIAL HISTORY       Social History     Tobacco Use    Smoking status: Former Smoker     Packs/day: 1.00     Years: 20.00     Pack years: 20.00     Quit date: 10/31/1994     Years since quittin.2    Smokeless tobacco: Never Used   Substance Use Topics    Alcohol use: Yes     Comment: 2 DRINKS A WEEK    Drug use: No     PHYSICAL EXAM     INITIAL VITALS: BP (!) 164/71   Pulse 64   Temp 98.1 °F (36.7 °C) (Oral)   Resp 16   Ht 5' 2\" (1.575 m)   Wt 183 lb (83 kg)   LMP 10/31/1975 (Approximate)   SpO2 100%   BMI 33.47 kg/m²    Physical Exam  Vitals signs and nursing note reviewed. Constitutional:       General: She is in acute distress. Appearance: She is well-developed. She is not ill-appearing, toxic-appearing or diaphoretic. HENT:      Head: Normocephalic and atraumatic. Eyes:      Pupils: Pupils are equal, round, and reactive to light. Neck:      Musculoskeletal: Normal range of motion and neck supple. Cardiovascular:      Rate and Rhythm: Normal rate and regular rhythm. Pulmonary:      Effort: Pulmonary effort is normal.      Breath sounds: Normal breath sounds. Abdominal:      General: Bowel sounds are normal.      Palpations: Abdomen is soft. Musculoskeletal: Normal range of motion. Comments: Right lower extremity: Negative Akhtar test, no calf tenderness. Tender palpation of the anterior medial aspect of the right knee no obvious bony deformity. Patient unable to flex or extend at the knee joint. Difficult assessing for ligamentous injury secondary to patient's increased pain with active movement. Skin:     General: Skin is warm. Capillary Refill: Capillary refill takes less than 2 seconds. Neurological:      Mental Status: She is alert and oriented to person, place, and time. MEDICAL DECISION MAKING:   Patient is a 68-year-old female who presented to the emergency department secondary to right knee pain. Differential diagnoses included occult fracture versus dislocation, also concern for ligamentous injury given mechanism. X-rays obtained of the right knee femur and hip.   Patient has a listed allergy to morphine to receive Dilaudid 0.5 mg IM. Patient will be reevaluated. After receiving Dilaudid patient had some nausea she received Zofran ODT. No acute findings on imaging, patient reevaluated placed in a knee immobilizer and crutches however difficult with ambulation, patient discussed with Dr. Sean Fajardo who is on for Ortho initially he was to see patient tomorrow in the office however given patient's inability to ambulate I discussed with patient admission for further testing Ortho consultation. Patient was discussed with the internal medicine service who agreed to admit for further evaluation treatment. Dr. Sean Fajardo updated on plan of care         All patient's question's and concerns were answered prior to disposition and patient and/or family expressed understanding and agreement of treatment plan. CRITICAL CARE:              NIH STROKE SCALE:            PROCEDURES:    Procedures    DIAGNOSTIC RESULTS   EKG:All EKG's are interpreted by the Emergency Department Physician who either signs or Co-signs this chart in the absence of a cardiologist.        RADIOLOGY:All plain film, CT, MRI, and formal ultrasound images (except ED bedside ultrasound) are read by the radiologist, see reports below, unless otherwisenoted in MDM or here. XR HIP 1 VW W PELVIS RIGHT   Final Result   No acute bony abnormalities are noted         XR FEMUR RIGHT (MIN 2 VIEWS)   Final Result   No acute bony abnormalities are noted         XR KNEE RIGHT (3 VIEWS)   Final Result   No acute bony abnormalities are noted         MRI KNEE RIGHT WO CONTRAST    (Results Pending)     LABS: All lab results were reviewed by myself, and all abnormals are listed below.   Labs Reviewed   BASIC METABOLIC PANEL W/ REFLEX TO MG FOR LOW K - Abnormal; Notable for the following components:       Result Value    Glucose 124 (*)     Bun/Cre Ratio 33 (*)     All other components within normal limits   CBC WITH AUTO DIFFERENTIAL   BASIC METABOLIC PANEL W/ REFLEX TO MG FOR LOW K CBC   PROTIME-INR       EMERGENCY DEPARTMENTCOURSE:         Vitals:    Vitals:    01/17/21 1511 01/17/21 1515   BP: (!) 164/71    Pulse: 64    Resp: 16    Temp: 98.1 °F (36.7 °C)    TempSrc: Oral    SpO2: 100%    Weight:  183 lb (83 kg)   Height:  5' 2\" (1.575 m)       The patient was given the following medications while in the emergency department:  Orders Placed This Encounter   Medications    DISCONTD: morphine (PF) injection 2 mg    DISCONTD: ondansetron (ZOFRAN) injection 4 mg    HYDROmorphone (DILAUDID) injection 0.5 mg    ondansetron (ZOFRAN-ODT) disintegrating tablet 4 mg    lisinopril (PRINIVIL;ZESTRIL) tablet 10 mg    sodium chloride flush 0.9 % injection 10 mL    sodium chloride flush 0.9 % injection 10 mL    OR Linked Order Group     potassium chloride (KLOR-CON M) extended release tablet 40 mEq     potassium bicarb-citric acid (EFFER-K) effervescent tablet 40 mEq     potassium chloride 10 mEq/100 mL IVPB (Peripheral Line)    magnesium sulfate 1 g in dextrose 5% 100 mL IVPB    enoxaparin (LOVENOX) injection 40 mg    OR Linked Order Group     promethazine (PHENERGAN) tablet 12.5 mg     ondansetron (ZOFRAN) injection 4 mg    polyethylene glycol (GLYCOLAX) packet 17 g    nicotine (NICODERM CQ) 21 MG/24HR 1 patch    OR Linked Order Group     acetaminophen (TYLENOL) tablet 650 mg     acetaminophen (TYLENOL) suppository 650 mg    ondansetron (ZOFRAN) injection 4 mg    oxyCODONE-acetaminophen (PERCOCET) 5-325 MG per tablet 1 tablet    promethazine (PHENERGAN) injection 6.25 mg     CONSULTS:  IP CONSULT TO INTERNAL MEDICINE    FINAL IMPRESSION      1. Acute pain of right knee    2. Inability to ambulate due to right knee          DISPOSITION/PLAN   DISPOSITION Admitted 01/17/2021 04:43:53 PM      PATIENT REFERRED TO:  No follow-up provider specified.   DISCHARGE MEDICATIONS:  New Prescriptions    No medications on file     Omar Cabrera MD  Attending Emergency Physician    This note was created with the assistance of a speech-recognition program. While intending to generate a document that actually reflects the content of the visit, no guarantees can be provided that every mistake has been identified and corrected by editing.                     Treasure Anne MD  21/29/60 2020

## 2021-01-17 NOTE — ED NOTES
Pt c/o increased nausea. Dr Sylvain Sagastume notified. N.o. given for IV and Zofran.       Hamilton Cheng RN  01/17/21 5430

## 2021-01-17 NOTE — ED NOTES
Pt to xr via stretcher. Warm blanket applied prior to transport.       Deborah Jordan, SHIRLEY  01/17/21 1531

## 2021-01-17 NOTE — H&P
fracture. Patient was given crutches with plan to follow-up with orthopedic as outpatient however patient was unable to stand on crutches and is getting admitted for pain control and Ortho evaluation in-house. Patient received Dilaudid in emergency room and is complaining of nausea. She reported side effects with morphine. Patient has underlying history of osteoporosis which was recently diagnosed about a week ago. Patient was prescribed medication however she has not started due to concern for side effects. Other comorbidities include COPD, sleep apnea, GERD. Past Medical History:     Past Medical History:   Diagnosis Date    Age-related osteoporosis without current pathological fracture     Arthritis 2012    GENERALIZED    Fibromyalgia 2012    GERD (gastroesophageal reflux disease) DX PRIOR TO 2004    ON RX     GERD (gastroesophageal reflux disease)     Hyperlipidemia     NO RX ALLERGIC TO STATINS    Hypertension     Kidney stone 2012 & 2013    X 2-SEVERAL STONES EACH TIME, PASSED ON OWN    Pneumonia 2012    Recurrent UTI     Sleep apnea 2012    HAS MACHINE BUT DOES NOT USE    Vision abnormalities 2014    RT DISTORTED, LT DECREASED    Wears glasses         Past Surgical History:     Past Surgical History:   Procedure Laterality Date    APPENDECTOMY  1963   [address], [place] [address] COLONOSCOPY N/A 10/31/2019    COLONOSCOPY WITH BIOPSY performed by Arminda Mota MD at [address] ENDOSCOPY N/A 11/12/2020    EGD ESOPHAGOGASTRODUODENOSCOPY performed by Arminda Mota MD at [address] VITRECTOMY Right 11/6/2014        Medications Prior to Admission:     Prior to Admission medications    Medication Sig Start Date End Date Taking?  Authorizing Provider   omeprazole (PRILOSEC) 20 MG delayed release capsule Take 40 mg by mouth as needed 6/12/20  Yes Historical Provider, MD (1.575 m)   Wt 183 lb (83 kg)   LMP 10/31/1975 (Approximate)   SpO2 100%   BMI 33.47 kg/m²   Temp (24hrs), Av.1 °F (36.7 °C), Min:98.1 °F (36.7 °C), Max:98.1 °F (36.7 °C)    No results for input(s): POCGLU in the last 72 hours. No intake or output data in the 24 hours ending 21 2515  Physical Exam  Vitals signs and nursing note reviewed. Constitutional:       General: She is not in acute distress. Appearance: She is not diaphoretic. HENT:      Head: Normocephalic and atraumatic. Nose:      Right Sinus: No maxillary sinus tenderness or frontal sinus tenderness. Left Sinus: No maxillary sinus tenderness or frontal sinus tenderness. Mouth/Throat:      Pharynx: No oropharyngeal exudate. Eyes:      General: No scleral icterus. Conjunctiva/sclera: Conjunctivae normal.      Pupils: Pupils are equal, round, and reactive to light. Neck:      Musculoskeletal: Full passive range of motion without pain and neck supple. Thyroid: No thyromegaly. Vascular: No JVD. Cardiovascular:      Rate and Rhythm: Normal rate and regular rhythm. Pulses:           Dorsalis pedis pulses are 2+ on the right side and 2+ on the left side. Heart sounds: Normal heart sounds. No murmur. Pulmonary:      Effort: Pulmonary effort is normal.      Breath sounds: Normal breath sounds. No wheezing or rales. Abdominal:      Palpations: Abdomen is soft. There is no mass. Tenderness: There is no abdominal tenderness. Musculoskeletal:      Right knee: She exhibits decreased range of motion. Tenderness found. Lymphadenopathy:      Head:      Right side of head: No submandibular adenopathy. Left side of head: No submandibular adenopathy. Cervical: No cervical adenopathy. Skin:     General: Skin is warm. Neurological:      Mental Status: She is alert and oriented to person, place, and time. Motor: No tremor. Psychiatric:         Behavior: Behavior is cooperative. Investigations:      Laboratory Testing:  Recent Results (from the past 24 hour(s))   CBC Auto Differential    Collection Time: 01/17/21  5:10 PM   Result Value Ref Range    WBC 9.7 3.5 - 11.3 k/uL    RBC 4.39 3.95 - 5.11 m/uL    Hemoglobin 12.8 11.9 - 15.1 g/dL    Hematocrit 39.3 36.3 - 47.1 %    MCV 89.5 82.6 - 102.9 fL    MCH 29.2 25.2 - 33.5 pg    MCHC 32.6 28.4 - 34.8 g/dL    RDW 13.8 11.8 - 14.4 %    Platelets 052 938 - 093 k/uL    MPV 10.6 8.1 - 13.5 fL    NRBC Automated 0.0 0.0 per 100 WBC    Differential Type NOT REPORTED     Seg Neutrophils 56 36 - 65 %    Lymphocytes 36 24 - 43 %    Monocytes 6 3 - 12 %    Eosinophils % 1 1 - 4 %    Basophils 1 0 - 2 %    Immature Granulocytes 0 0 %    Segs Absolute 5.37 1.50 - 8.10 k/uL    Absolute Lymph # 3.51 1.10 - 3.70 k/uL    Absolute Mono # 0.59 0.10 - 1.20 k/uL    Absolute Eos # 0.13 0.00 - 0.44 k/uL    Basophils Absolute 0.07 0.00 - 0.20 k/uL    Absolute Immature Granulocyte 0.04 0.00 - 0.30 k/uL    WBC Morphology NOT REPORTED     RBC Morphology NOT REPORTED     Platelet Estimate NOT REPORTED    Basic Metabolic Panel w/ Reflex to MG    Collection Time: 01/17/21  5:10 PM   Result Value Ref Range    Glucose 124 (H) 70 - 99 mg/dL    BUN 20 8 - 23 mg/dL    CREATININE 0.61 0.50 - 0.90 mg/dL    Bun/Cre Ratio 33 (H) 9 - 20    Calcium 9.1 8.6 - 10.4 mg/dL    Sodium 138 135 - 144 mmol/L    Potassium 4.0 3.7 - 5.3 mmol/L    Chloride 105 98 - 107 mmol/L    CO2 22 20 - 31 mmol/L    Anion Gap 11 9 - 17 mmol/L    GFR Non-African American >60 >60 mL/min    GFR African American >60 >60 mL/min    GFR Comment          GFR Staging NOT REPORTED        Imaging/Diagonstics:    Xr Femur Right (min 2 Views)    Result Date: 1/17/2021  No acute bony abnormalities are noted     Xr Knee Right (3 Views)    Result Date: 1/17/2021  No acute bony abnormalities are noted     Xr Hip 1 Vw W Pelvis Right    Result Date: 1/17/2021  No acute bony abnormalities are noted        Assessment : Primary Problem  Knee pain    Active Hospital Problems    Diagnosis Date Noted    Knee pain [M25.569] 01/17/2021    Age-related osteoporosis without current pathological fracture [M81.0] 01/17/2021    Major depressive disorder, single episode, mild (HCC) [F32.0] 09/10/2020    Hypertension [I10]     OA (osteoarthritis of spine) [M47.9] 07/23/2015    COPD (chronic obstructive pulmonary disease) ([facility] [address] [J44.9] 07/23/2015       Plan:     Patient status Admit as observation in the  Med/Surge    1. Right knee pain -traumatic injury -concern for meniscal injury. Ortho evaluation, plan for MRI knee. Pain control. Nonweightbearing. Percocet for pain control, antiemetics for nausea. 2. Age-related osteoporosis -vitamin D supplement, calcium supplement, Fosamax. Patient concerned due to history of GERD. Advised to take standing upright with ample water. 3. Major depression with anxiety disorder- -on clonazepam  4. Fibromyalgia  5. GERD-PPI. 6. Essential hypertension-continue lisinopril    Consultations:   IP CONSULT TO INTERNAL MEDICINE    Patient is admitted as observation status because of co-morbidities listed above, severity of signs and symptoms as outlined, requirement for current medical therapies and most importantly because of direct risk to patient if care not provided in a hospital setting.     Colette Love MD  1/17/2021    Copy sent to Dr. Arnol Valles MD    (Please note that portions of this note were completed with a voice recognition program. Efforts were made to edit the dictations but occasionally words are mis-transcribed.)

## 2021-01-17 NOTE — ED NOTES
Bed: 10  Expected date: 1/17/21  Expected time: 2:55 PM  Means of arrival: [address]  Comments:  [facility] [address] [facility].  Adelita Ryan RN  01/17/21 2657

## 2021-01-18 ENCOUNTER — APPOINTMENT (OUTPATIENT)
Dept: MRI IMAGING | Age: 76
End: 2021-01-18
Payer: MEDICARE

## 2021-01-18 VITALS
WEIGHT: 183 LBS | HEART RATE: 58 BPM | OXYGEN SATURATION: 96 % | BODY MASS INDEX: 33.68 KG/M2 | HEIGHT: 62 IN | RESPIRATION RATE: 16 BRPM | SYSTOLIC BLOOD PRESSURE: 118 MMHG | TEMPERATURE: 97.7 F | DIASTOLIC BLOOD PRESSURE: 68 MMHG

## 2021-01-18 LAB
ANION GAP SERPL CALCULATED.3IONS-SCNC: 8 MMOL/L (ref 9–17)
BUN BLDV-MCNC: 17 MG/DL (ref 8–23)
BUN/CREAT BLD: 25 (ref 9–20)
CALCIUM SERPL-MCNC: 9.1 MG/DL (ref 8.6–10.4)
CHLORIDE BLD-SCNC: 106 MMOL/L (ref 98–107)
CO2: 24 MMOL/L (ref 20–31)
CREAT SERPL-MCNC: 0.67 MG/DL (ref 0.5–0.9)
GFR AFRICAN AMERICAN: >60 ML/MIN
GFR NON-AFRICAN AMERICAN: >60 ML/MIN
GFR SERPL CREATININE-BSD FRML MDRD: ABNORMAL ML/MIN/{1.73_M2}
GFR SERPL CREATININE-BSD FRML MDRD: ABNORMAL ML/MIN/{1.73_M2}
GLUCOSE BLD-MCNC: 97 MG/DL (ref 70–99)
HCT VFR BLD CALC: 38.2 % (ref 36.3–47.1)
HEMOGLOBIN: 12.3 G/DL (ref 11.9–15.1)
INR BLD: 1.1
MCH RBC QN AUTO: 29.2 PG (ref 25.2–33.5)
MCHC RBC AUTO-ENTMCNC: 32.2 G/DL (ref 28.4–34.8)
MCV RBC AUTO: 90.7 FL (ref 82.6–102.9)
NRBC AUTOMATED: 0 PER 100 WBC
PDW BLD-RTO: 14.3 % (ref 11.8–14.4)
PLATELET # BLD: 323 K/UL (ref 138–453)
PMV BLD AUTO: 10.7 FL (ref 8.1–13.5)
POTASSIUM SERPL-SCNC: 4.1 MMOL/L (ref 3.7–5.3)
PROTHROMBIN TIME: 13.7 SEC (ref 11.5–14.2)
RBC # BLD: 4.21 M/UL (ref 3.95–5.11)
SODIUM BLD-SCNC: 138 MMOL/L (ref 135–144)
WBC # BLD: 8.8 K/UL (ref 3.5–11.3)

## 2021-01-18 PROCEDURE — 6370000000 HC RX 637 (ALT 250 FOR IP): Performed by: FAMILY MEDICINE

## 2021-01-18 PROCEDURE — 97535 SELF CARE MNGMENT TRAINING: CPT

## 2021-01-18 PROCEDURE — 96372 THER/PROPH/DIAG INJ SC/IM: CPT

## 2021-01-18 PROCEDURE — 2580000003 HC RX 258: Performed by: FAMILY MEDICINE

## 2021-01-18 PROCEDURE — 85027 COMPLETE CBC AUTOMATED: CPT

## 2021-01-18 PROCEDURE — 80048 BASIC METABOLIC PNL TOTAL CA: CPT

## 2021-01-18 PROCEDURE — G0378 HOSPITAL OBSERVATION PER HR: HCPCS

## 2021-01-18 PROCEDURE — 99225 PR SBSQ OBSERVATION CARE/DAY 25 MINUTES: CPT | Performed by: FAMILY MEDICINE

## 2021-01-18 PROCEDURE — 97167 OT EVAL HIGH COMPLEX 60 MIN: CPT

## 2021-01-18 PROCEDURE — 97163 PT EVAL HIGH COMPLEX 45 MIN: CPT

## 2021-01-18 PROCEDURE — 36415 COLL VENOUS BLD VENIPUNCTURE: CPT

## 2021-01-18 PROCEDURE — 6360000002 HC RX W HCPCS: Performed by: FAMILY MEDICINE

## 2021-01-18 PROCEDURE — 73721 MRI JNT OF LWR EXTRE W/O DYE: CPT

## 2021-01-18 PROCEDURE — 85610 PROTHROMBIN TIME: CPT

## 2021-01-18 PROCEDURE — 99220 PR INITIAL OBSERVATION CARE/DAY 70 MINUTES: CPT | Performed by: ORTHOPAEDIC SURGERY

## 2021-01-18 PROCEDURE — 97110 THERAPEUTIC EXERCISES: CPT

## 2021-01-18 PROCEDURE — 97116 GAIT TRAINING THERAPY: CPT

## 2021-01-18 RX ORDER — DOCUSATE SODIUM 100 MG/1
100 CAPSULE, LIQUID FILLED ORAL 2 TIMES DAILY
Qty: 60 CAPSULE | Refills: 0 | Status: SHIPPED | OUTPATIENT
Start: 2021-01-18 | End: 2021-02-17

## 2021-01-18 RX ORDER — OXYCODONE HYDROCHLORIDE AND ACETAMINOPHEN 5; 325 MG/1; MG/1
1 TABLET ORAL EVERY 4 HOURS PRN
Qty: 20 TABLET | Refills: 0 | Status: SHIPPED | OUTPATIENT
Start: 2021-01-18 | End: 2021-01-23

## 2021-01-18 RX ADMIN — OXYCODONE AND ACETAMINOPHEN 1 TABLET: 5; 325 TABLET ORAL at 09:22

## 2021-01-18 RX ADMIN — Medication 10 ML: at 09:23

## 2021-01-18 RX ADMIN — OXYCODONE AND ACETAMINOPHEN 1 TABLET: 5; 325 TABLET ORAL at 03:12

## 2021-01-18 RX ADMIN — ENOXAPARIN SODIUM 40 MG: 100 INJECTION SUBCUTANEOUS at 09:22

## 2021-01-18 RX ADMIN — OXYCODONE AND ACETAMINOPHEN 1 TABLET: 5; 325 TABLET ORAL at 15:01

## 2021-01-18 ASSESSMENT — ENCOUNTER SYMPTOMS
WHEEZING: 0
NAUSEA: 0
CHEST TIGHTNESS: 0
DIARRHEA: 0
CONSTIPATION: 0
VOMITING: 0
RHINORRHEA: 0
SHORTNESS OF BREATH: 0
COUGH: 0
BLOOD IN STOOL: 0
ABDOMINAL PAIN: 0

## 2021-01-18 ASSESSMENT — PAIN SCALES - GENERAL: PAINLEVEL_OUTOF10: 10

## 2021-01-18 NOTE — PROGRESS NOTES
Physical Therapy    Facility/Department: [facility]  Initial Assessment    NAME: Brigida Maki  : 1945  MRN: [id number]    Date of Service: 2021    Discharge Recommendations:  Home with Home health PT     The patient is a 70 y.o. Non-/non  female who presents with Knee Pain (R knee \"popped\" while squatting down today)   and she is admitted to the hospital for the management of  Knee pain. Patient came to emergency room by EMS after she had a fall at home. She reported that she was cleaning under the skin when she tried to bend down and heard a pop in her right knee. Patient immediately started to have severe right knee pain and was unable to bear weight. Assessment   Assessment: Pt. is prepared for d/c home, practicing transfers, gait and stairs, WBAT Rt. LE with Rt. knee immobilizer. D/C planner to get Yash Brooks. RW for pt. prior to d/c. Recommending home PT for safety eval.  Prognosis: Excellent  Decision Making: Medium Complexity  PT Education: Goals;PT Role;Plan of Care  Patient Education: walker safety, how to size Jr. RW to self, car transfer, stair negotiation, HEP, donning/doffing immobilizer and when to wear  REQUIRES PT FOLLOW UP: Yes  Activity Tolerance  Activity Tolerance: Patient Tolerated treatment well       Patient Diagnosis(es): The primary encounter diagnosis was Acute pain of right knee. Diagnoses of Inability to ambulate due to right knee and Tear of lateral meniscus of right knee, current, unspecified tear type, initial encounter were also pertinent to this visit. has a past medical history of Age-related osteoporosis without current pathological fracture, Arthritis, Fibromyalgia, GERD (gastroesophageal reflux disease), GERD (gastroesophageal reflux disease), Hyperlipidemia, Hypertension, Kidney stone, Pneumonia, Recurrent UTI, Sleep apnea, Vision abnormalities, and Wears glasses. has a past surgical history that includes Hysterectomy ();  Appendectomy (4965); Tonsillectomy (2);  section, classic (1964, 1972, & 1974); vitrectomy (Right, 2014); Colonoscopy; Colonoscopy (N/A, 10/31/2019); and Upper gastrointestinal endoscopy (N/A, 2020). Restrictions  Restrictions/Precautions  Restrictions/Precautions: Weight Bearing, General Precautions, Fall Risk  Lower Extremity Weight Bearing Restrictions  Right Lower Extremity Weight Bearing: Weight Bearing As Tolerated  Vision/Hearing  Vision: Impaired  Vision Exceptions: Wears glasses at all times  Hearing: Within functional limits     Subjective  General  Chart Reviewed: Yes  Patient assessed for rehabilitation services?: Yes  Family / Caregiver Present: No  Follows Commands: Within Functional Limits  Subjective  Subjective: no pain besides Rt. knee at time of eval          Orientation  Orientation  Overall Orientation Status: Within Normal Limits  Social/Functional History  Social/Functional History  Lives With: Spouse, Family(2 adult grandkids)  Type of Home: House  Home Layout: Two level, Bed/Bath upstairs, 1/2 bath on main level  Home Access: Stairs to enter without rails  Entrance Stairs - Number of Steps: 2+1  Bathroom Shower/Tub: Tub/Shower unit  Bathroom Toilet: Handicap height(upstairs is standard)  Home Equipment: (no DME)  ADL Assistance: Independent  Homemaking Assistance: Independent  Ambulation Assistance: Independent  Transfer Assistance: Independent  Active : Yes  Occupation: Unemployed  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  Receptive to all education and wanting to be prepared for getting home    AROM RLE (degrees)  RLE General AROM: ankle WFL;  pt. avoiding full DF due to Goals  Patient Goals   Patient goals : Pt. to d/c home after this eval.  Should pt. stay, will establish STG       Therapy Time   Individual Concurrent Group Co-treatment   Time In 0937         Time Out 1118         Minutes 101              Treatment time:  58min.     Adia Kelly, PT

## 2021-01-18 NOTE — CARE COORDINATION
Case Management Initial Discharge Plan  Shaina Cononr             Met with:patient to discuss discharge plans. Information verified: address, contacts, phone number, , insurance Yes  PCP: Saskia Melo MD  Date of last visit: 20    Insurance Provider: Medicare, [facility]    Discharge Planning    Living Arrangements:  Spouse/Significant Other   Support Systems:  Spouse/Significant Other    Home has 2 stories  3 stairs to climb to get into front door, 13 stairs to climb to reach second floor  Location of bedroom/bathroom in home  - second floor, has 1/2 bath on main    Patient able to perform ADL's:Assisted    Current Services (outpatient & in home) none  DME equipment: crutches  DME provider: N/A    Pharmacy: [facility] in [address] [place] [address] Needed:  [facility] [employer] [facility] for therapy, Jr walker    Patient agreeable to home care: Yes  Freedom of choice provided:  yes    Prior SNF/Rehab Placement and Facility: No  Agreeable to SNF/Rehab: No  Edward of choice provided: n/a   Evaluation: n/a    Expected Discharge date:  21  Patient expects to be discharged to:  home  Follow Up Appointment: Best Day/ Time: Tuesday PM    Transportation provider: betty  Transportation arrangements needed for discharge: No    Readmission Risk              Risk of Unplanned Readmission:        0             Does patient have a readmission risk score greater than 14?: No  If yes, follow-up appointment must be made within 7 days of discharge. Goal of Care:       Discharge Plan: Met with patient at bedside. Lives with , independent and drives. Admitted for rt knee pain after squatting down under sink and heard knee pop. Dr. Cadence Ray consulted and pt had MRI today. Has rt knee meniscus tear. Crutches at bedside.  walker ordered from [employer] and will be delivered to patients room prior to D/C today. Script, face sheet and MD note faxed.   F/U appointment scheduled with Dr. Cadence Ray 2-2 at 10:15am and pt informed. Therapy recommending home therapy and pt agreeable to [facility]. Referral called to [facility] and informed of D/C today. The Plan for Transition of Care is related to the following treatment goals: therapy S/P rt knee meniscus tear    The Patient was provided with a choice of provider and agrees   with the discharge plan. [x] Yes [] No    Freedom of choice list was provided with basic dialogue that supports the patient's individualized plan of care/goals, treatment preferences and shares the quality data associated with the providers.  [x] Yes [] No                     Electronically signed by Priya Wood RN on 1/18/21 at 11:30 AM EST

## 2021-01-18 NOTE — PROGRESS NOTES
CLINICAL PHARMACY NOTE: MEDS TO [address] Select Patient?: Yes  Total # of Prescriptions Filled: 2   The following medications were delivered to the patient:  · OXYCODONE/APAP 5/325 MG TABS  · STOOL SOFTENER 100 MG CAPS (OTC)  Total # of Interventions Completed: 1  Time Spent (min): 5    Additional Documentation:  HAD THE RXS READY FOR DELIVERY, BUT SPOKE TO THE PT AND SHE SAID SHE DID NOT HAVE THE MONEY ON HER CURRENTLY. SHE GAVE US HER 'S PHONE NUMBER AND TOLD US HE COULD PAY FOR THE MEDS. THE PT'S  CAME IN TO PAY FOR AND  THE MEDS AT THE PHARMACY ON 1/18/21. $14.40 COPAY.

## 2021-01-18 NOTE — DISCHARGE SUMMARY
[facility]  Office: [phone]  [facility] DO, Ayaka Shen MD, Sandra Smith MD, Yolis Mallory MD, Venancio Johnson MD, Jamaica Koch MD, Breanna Boyd MD, Caitlin Pérez MD, Catherine Johnson MD, Mbonu Mable Nyhan MD, Stuart Lackey DO, Wero Ortiz MD, Clarisse Tirado MD, Mayur Correa DO, Martha Pacheco MD,  Ricky Dorantes DO, Octavio Forbes MD, Clovis Esparza MD, Ramírez Chapa [facility], [facility], Kory Holguin CNP, Deirdre Milian CNS, Silvia Najera CNP, Baron Lozano CNP, Sarah Painting CNP, Morelia Martinez CNP, Daphne Maddox CNP, Ankit Quintanilla PA-C, Cyndi Candelario CNP, Lissette Cat CNP, Navdeep Saha CNP, Mell Fernandez CNP, Tomy Gama CNP, Ethan Rueda, [address]      Discharge Summary     Patient ID: Kory Moralez  :  1945   MRN: [id number]     ACCOUNT:  [de-identified]   Patient Location :   Patient's PCP: Zelda Mccann MD  Admit Date: 2021   Discharge Date: 2021     Length of Stay: 0  Code Status:  Prior  Admitting Physician: Yolis Mallory MD  Discharge Physician: Yolis Mallory MD     Active Discharge Diagnosis :     Primary Problem  Knee pain      [facility]    Diagnosis Date Noted    Knee pain [M25.569] 2021    Age-related osteoporosis without current pathological fracture [M81.0] 2021    Major depressive disorder, single episode, mild ([facility] [address] [F32.0] 09/10/2020    Hypertension [I10]     OA (osteoarthritis of spine) [M47.9] 2015    COPD (chronic obstructive pulmonary disease) ([facility] [address] [J44.9] 2015       Admission Condition:  fair     Discharged Condition: stable    Hospital Stay:     Hospital Course:  Kory Moralez is a 70 y.o. female who was admitted for the management of   Knee pain , presented to ER with Knee Pain (R knee \"popped\" while squatting down today)    Patient came to emergency room by EMS after 7/24/2020 12/19/2018   BUN 8 - 23 mg/dL 17 20 18 19 -   Cr 0.50 - 0.90 mg/dL 0.67 0.61 0.65 0.68 0.76   K 3.7 - 5.3 mmol/L 4.1 4.0 4.2 4.4 4.3   Na 135 - 144 mmol/L 138 138 139 140 -     Lab Results   Component Value Date    ALT 15 09/08/2020    AST 17 09/08/2020    ALKPHOS 79 09/08/2020    BILITOT 0.30 09/08/2020     Lab Results   Component Value Date    TSH 3.09 07/24/2020     No results found for: HAV, HEPAIGM, HEPBIGM, HEPBCAB, HBEAG, HEPCAB  Lab Results   Component Value Date    COLORU YELLOW 09/08/2020    NITRU NEGATIVE 09/08/2020    GLUCOSEU NEGATIVE 09/08/2020    GLUCOSEU NEGATIVE 12/09/2011    KETUA NEGATIVE 09/08/2020    UROBILINOGEN Normal 09/08/2020    BILIRUBINUR NEGATIVE 09/08/2020    BILIRUBINUR negative 10/10/2019    BILIRUBINUR NEGATIVE 12/09/2011     Lab Results   Component Value Date    LABA1C 5.7 07/24/2020     No results found for: EAG  Lab Results   Component Value Date    INR 1.1 01/18/2021    PROTIME 13.7 01/18/2021       Xr Femur Right (min 2 Views)    Result Date: 1/17/2021  No acute bony abnormalities are noted     Xr Knee Right (3 Views)    Result Date: 1/17/2021  No acute bony abnormalities are noted     Mri Knee Right Wo Contrast    Result Date: 1/18/2021  Abnormal signal and morphology of the lateral meniscus anterior horn extending to the anterior root attachment suspicious for a tear. No medial meniscus tear identified. Cruciate and collateral ligaments are intact. No acute fracture or significant bone marrow edema. Small knee joint effusion. Tricompartmental osteoarthrosis, most evident at the patella where there are areas of full-thickness cartilage loss.      Xr Hip 1 Vw W Pelvis Right    Result Date: 1/17/2021  No acute bony abnormalities are noted             Consultations:    Consults:     Final Specialist Recommendations/Findings:   IP CONSULT TO INTERNAL MEDICINE      The patient was seen and examined on day of discharge and this discharge summary is in conjunction with any daily progress note from day of discharge. Discharge plan:     Disposition: HomeCare     Physician Follow Up:     Harman Blackmon MD  [facility] [address]  [phone]    Go on 2/2/2021  Appointment 2-2-21 at 10:15am    [address] [facility]  [address]  [phone]      Hassan Litten       Requiring Further Evaluation/Follow Up POST HOSPITALIZATION/Incidental Findings:     Diet: regular diet    Activity: As tolerated. Instructions to Patient: walker with wheels . Follow up with Ortho. Discharge Medications:      Medication List      START taking these medications    docusate sodium 100 MG capsule  Commonly known as: COLACE  Take 1 capsule by mouth 2 times daily     oxyCODONE-acetaminophen 5-325 MG per tablet  Commonly known as: PERCOCET  Take 1 tablet by mouth every 4 hours as needed for Pain for up to 5 days. CHANGE how you take these medications    omeprazole 40 MG delayed release capsule  Commonly known as: PRILOSEC  Take 1 capsule by mouth every morning (before breakfast)  What changed: Another medication with the same name was removed. Continue taking this medication, and follow the directions you see here. CONTINUE taking these medications    clonazePAM 0.5 MG tablet  Commonly known as: KlonoPIN  Take 1 tablet by mouth 2 times daily for 30 days.      lisinopril 10 MG tablet  Commonly known as: PRINIVIL;ZESTRIL  Take 1 tablet by mouth daily           Where to Get Your Medications      These medications were sent to [facility] [address] [place] [address] [phone] [facility] [phone]  [address] [place] [address],  R  [facility] [address]    Phone: [phone]   · docusate sodium 100 MG capsule     You can get these medications from any pharmacy Bring a paper prescription for each of these medications  · oxyCODONE-acetaminophen 5-325 MG per tablet         Time Spent on discharge is  35 mins in patient examination, evaluation, counseling as well as medication reconciliation, prescriptions for required medications, discharge plan and follow up. Electronically signed by   Luis Angel Taylor MD  1/18/2021        Thank you Dr. Saskia Melo MD for the opportunity to be involved in this patient's care.

## 2021-01-18 NOTE — PLAN OF CARE
Problem: Falls - Risk of:  Goal: Will remain free from falls  Description: Will remain free from falls  Outcome: Ongoing   Patient is a fall risk during this admission. Fall risk assessment was performed. Patient is absent of falls. Bed is in the lowest position. Wheels on the bed are locked. Call light and bed side table are within reach. Clutter is removed. Patient was educated to call out when needing assistance or wanting to get out of bed. Patient offered toileting assistance during rounding. Hourly rounds have been performed.

## 2021-01-18 NOTE — DISCHARGE INSTR - COC
Continuity of Care Form    Patient Name: Shaina Connor   :  1945  MRN:  [id number]    Admit date:  2021  Discharge date:  21    Code Status Order: Full Code   Advance Directives:   Advance Care Flowsheet Documentation       Date/Time Healthcare Directive Type of Healthcare Directive Copy in [address] Agent's Name Healthcare Agent's Phone Number    21 1000  No, patient does not have an advance directive for healthcare treatment -- -- -- -- --            Admitting Physician:  Luis Angel Taylor MD  PCP: Saskia Melo MD    Discharging Nurse: Aye 23 Unit/Room#:   Discharging Unit Phone Number: [phone]    Emergency Contact:   Extended Emergency Contact Information  Primary Emergency Contact: Den Johnson  Address: [address]  Home Phone: [phone]  Mobile Phone: [phone]  Relation: Spouse  Secondary Emergency Contact: Marissa Puckett  Home Phone: [phone]  Relation: Child    Past Surgical History:  Past Surgical History:   Procedure Laterality Date    [address] [place] [address]    COLONOSCOPY      COLONOSCOPY N/A 10/31/2019    COLONOSCOPY WITH BIOPSY performed by Sylwia Houston MD at [address] N/A 2020    EGD ESOPHAGOGASTRODUODENOSCOPY performed by Sylwia Houston MD at [address] [place] [address] 2014       Immunization History:   Immunization History   Administered Date(s) Administered    Influenza Vaccine, unspecified formulation 2015, 10/25/2017    Influenza, High Dose (Fluzone 65 yrs and older) 10/14/2016, 10/01/2019    Influenza, Triv, 3 Years and older, IM (Afluria (5 yrs and older) 10/25/2017       Active Problems:  Patient Active Problem List   Diagnosis Code    Dyslipidemia E78.5    OA (osteoarthritis of spine) M47.9    HA (headache) R51.9    COPD (chronic obstructive pulmonary disease) (HCC) J44.9    Macular pucker H35.379    Depression F32.9    Obesity (BMI 30-39. 9) E66.9    Hypertension I10    Nephrolithiasis N20.0    GERD (gastroesophageal reflux disease) K21.9    Migraine without aura and without status migrainosus, not intractable G43.009    Generalized OA M15.9    Atypical chest pain R07.89    Acute pain of right shoulder M25.511    Impingement syndrome of right shoulder M75.41    Otalgia of left ear H92.02    Polyp of colon K63.5    Major depressive disorder, single episode, mild (HCC) F32.0    Knee pain M25.569    Age-related osteoporosis without current pathological fracture M81.0       Isolation/Infection:   Isolation            No Isolation          Patient Infection Status       Infection Onset Added Last Indicated Last Indicated By Review Planned Expiration Resolved Resolved By    None active    Resolved    COVID-19 Rule Out 11/08/20 11/09/20 11/08/20 Covid-19 Ambulatory (Ordered)   11/10/20 Rule-Out Test Resulted            Nurse Assessment:  Last Vital Signs: /63   Pulse 60   Temp 98.1 °F (36.7 °C) (Oral)   Resp 18   Ht 5' 2\" (1.575 m)   Wt 183 lb (83 kg)   LMP 10/31/1975 (Approximate)   SpO2 94%   BMI 33.47 kg/m²     Last documented pain score (0-10 scale): Pain Level: 5  Last Weight:   Wt Readings from Last 1 Encounters:   01/17/21 183 lb (83 kg)     Mental Status:  alert    IV Access:  - None    Nursing Mobility/ADLs:  Walking   Assisted  Transfer  Independent  Bathing  Assisted  Dressing  Independent  Toileting  Independent  Feeding  [address]  Independent  Med Delivery   whole    Wound Care Documentation and Therapy:        Elimination:  Continence: Bowel: No  Bladder: No  Urinary Catheter: None   Colostomy/Ileostomy/Ileal Conduit: No       Date of Last BM: ***  No intake or output data in the 24 hours ending 01/18/21 1127  No intake/output data recorded. Safety Concerns:      At Risk for Falls    Impairments/Disabilities:      rt knee pain    Nutrition Therapy:  Current Nutrition Therapy:   { MIGUEL Diet List:870887570}    Routes of Feeding: {CHP DME Other Feedings:503304482}  Liquids: {Slp liquid thickness:53816}  Daily Fluid Restriction: {CHP DME Yes amt example:327114204}  Last Modified Barium Swallow with Video (Video Swallowing Test): {Done Not Done PMF}    Treatments at the Time of Hospital Discharge:   Respiratory Treatments: ***  Oxygen Therapy:  is not on home oxygen therapy. Ventilator:    - No ventilator support    Rehab Therapies: Physical Therapy and Occupational Therapy  Weight Bearing Status/Restrictions: No weight bearing restirctions  Other Medical Equipment (for information only, NOT a DME order):  walker  Other Treatments: ***    Patient's personal belongings (please select all that are sent with patient):  {[facility] DME Belongings:909999687}    RN SIGNATURE:  Electronically signed by Justin Kilpatrick RN on 21 at 2:31 PM EST    CASE MANAGEMENT/SOCIAL WORK SECTION    Inpatient Status Date: ***    Readmission Risk Assessment Score:  Readmission Risk              Risk of Unplanned Readmission:        0           Discharging to Facility/ Agency   Name: [facility] Eason  Address:  Phone: [phone]  Fax: [phone]      / signature: Electronically signed by Deepti Nichols RN on 21 at 11:28 AM EST    PHYSICIAN SECTION    Prognosis: Good    Condition at Discharge: Stable    Rehab Potential (if transferring to Rehab): Good    Recommended Labs or Other Treatments After Discharge: Use walker. Follow-up with orthopedic surgery. Physician Certification: I certify the above information and transfer of Lindsey Landeros  is necessary for the continuing treatment of the diagnosis listed and that she requires Home Care for less 30 days.      Update Admission H&P: No change in H&P    PHYSICIAN SIGNATURE:  Electronically signed by Abel Eckert Gilson Hunter MD on 1/18/21 at 2:56 PM EST

## 2021-01-18 NOTE — PROGRESS NOTES
Pt admitted to room 2015. Oriented to room, call light and bed mechanics. Side rails up x2. Call light within reach. Orders reviewed. Dr. Sakshi Pinto is at the bedside. She is OK to ambulate with a knee brace and crutches. She is OK to go home per Dr Sakshi Pinto and follow up in the office.

## 2021-01-18 NOTE — PROGRESS NOTES
The pt was discharged to home. The discharged instructions were given and reviewed with the pt. She was escorted to the exit per wheelchair in stable condition.

## 2021-01-18 NOTE — PLAN OF CARE
Problem: Falls - Risk of:  Goal: Will remain free from falls  Description: Will remain free from falls  1/18/2021 1537 by Anupama Kuhn RN  Outcome: Completed  1/18/2021 1301 by Anupama Kuhn RN  Outcome: Ongoing  Goal: Absence of physical injury  Description: Absence of physical injury  Outcome: Completed     Problem: Infection:  Goal: Will remain free from infection  Description: Will remain free from infection  Outcome: Completed     Problem: Safety:  Goal: Free from accidental physical injury  Description: Free from accidental physical injury  Outcome: Completed  Goal: Free from intentional harm  Description: Free from intentional harm  Outcome: Completed     Problem: Daily Care:  Goal: Daily care needs are met  Description: Daily care needs are met  Outcome: Completed     Problem: Pain:  Goal: Patient's pain/discomfort is manageable  Description: Patient's pain/discomfort is manageable  Outcome: Completed     Problem: Skin Integrity:  Goal: Skin integrity will stabilize  Description: Skin integrity will stabilize  Outcome: Completed     Problem: Discharge Planning:  Goal: Patients continuum of care needs are met  Description: Patients continuum of care needs are met  Outcome: Completed

## 2021-01-18 NOTE — PROGRESS NOTES
[facility]  Office: [address], DO, Zoey Velasquez, DO, Ju Dodson, DO, Netty Sacks Blood, DO, Bib Ervin MD, Antonella Caldwell MD, Samson Roman MD, Remberto Condon MD, Mally Hernandez MD, Isabel Lehman MD, Sharan Felty, MD, Rik Rocha MD, William Holt MD, Joseluis Walker, DO, Russ Grover MD, Sola Barnes MD, Jourdan Davila, DO, Hitesh Uriostegui MD,  Geronimo Barone, DO, Tamanna Nath MD, Jamar Wynne MD, Rickie Zamudio, [facility], [facility], CNP, Marisa Ortiz, CNP, Lizz Gardner, CNS, Singh Chatman, CNP, Jim Quintero, CNP, Milagros Gomes, CNP, Tavon Villatoro, CNP, John Will, CNP, Yo Michaud PA-C, Dariel Baker, [facility], Santiago Farias, CNP, Reanna Carroll, CNP, Cleveland Gonzalez, CNP, Teddy Montgomery, CNP, [facility]      Daily Progress Note     Admit Date: 1/17/2021  Bed/Room No.  2015/2015-01  Admitting Physician : Samson Roman MD  Code Status :Full Code  Hospital Day:  LOS: 0 days   Chief Complaint:     Chief Complaint   Patient presents with    Knee Pain     R knee \"popped\" while squatting down today     Principal Problem:    Knee pain  Active Problems:    OA (osteoarthritis of spine)    COPD (chronic obstructive pulmonary disease) ([facility] [address]    Hypertension    Major depressive disorder, single episode, mild ([facility]    Age-related osteoporosis without current pathological fracture  Resolved Problems:    * No resolved hospital problems. *    Subjective : Interval History/Significant events :  01/18/21    Patient reports improvement in right knee pain. She was able to ambulate with walker with physical therapy. She denies any weakness, numbness. She is alert and oriented. Vitals - Stable afebrile  Labs - normal kidney function. Nursing notes , Consults notes reviewed. Overnight events and updates discussed with Nursing staff .    Background History:         Thu Cornfield is 70 y.o. female  Who was admitted to the [facility] on 1/17/2021 for treatment of Knee pain. Patient came to emergency room by EMS after she had a fall at home. She reported that she was cleaning under the skin when she tried to bend down and heard a pop in her right knee. Patient immediately started to have severe right knee pain and was unable to bear weight. EMS was called and patient was transported to ER. Patient is in severe pain, uncomfortable. She denies any weakness. X-rays were negative for any fracture. Patient was given crutches with plan to follow-up with orthopedic as outpatient however patient was unable to stand on crutches and is getting admitted for pain control and Ortho evaluation in-house. Patient received Dilaudid in emergency room and is complaining of nausea. She reported side effects with morphine. Patient has underlying history of osteoporosis which was recently diagnosed about a week ago. Patient was prescribed medication however she has not started due to concern for side effects. Other comorbidities include COPD, sleep apnea, GERD. Patient was admitted for pain control as she was unable to ambulate with crutches. Orthopedic surgery was consulted. Patient had MRI knee without contrast which showed lateral meniscus anterior horn tear without any acute fractures. PMH:  Past Medical History:   Diagnosis Date    Age-related osteoporosis without current pathological fracture     Arthritis 2012    GENERALIZED    Fibromyalgia 2012    GERD (gastroesophageal reflux disease) DX PRIOR TO 2004    ON RX     GERD (gastroesophageal reflux disease)     Hyperlipidemia     NO RX ALLERGIC TO STATINS    Hypertension     Kidney stone 2012 & 2013    X 2-SEVERAL STONES EACH TIME, PASSED ON OWN    Pneumonia 2012    Recurrent UTI     Sleep apnea 2012    HAS MACHINE BUT DOES NOT USE    Vision abnormalities 2014    RT DISTORTED, LT DECREASED    Wears glasses       Allergies:    Allergies   Allergen Reactions    Cefdinir Itching    Morphine Nausea And Vomiting    Avelox [Moxifloxacin]      UNSURE OF REACTION      Ciprofloxacin Itching    Quinolones      UNSURE OF REACTION      Statins Other (See Comments)     MUSCLE WEAKNESS      Sulfa Antibiotics Itching      Medications :      lisinopril, 10 mg, Oral, Daily      sodium chloride flush, 10 mL, Intravenous, 2 times per day      enoxaparin, 40 mg, Subcutaneous, Daily        Review of Systems   Review of Systems   Constitutional: Negative for appetite change, fatigue, fever and unexpected weight change. HENT: Negative for congestion, rhinorrhea and sneezing. Eyes: Negative for visual disturbance. Respiratory: Negative for cough, chest tightness, shortness of breath and wheezing. Cardiovascular: Negative for chest pain and palpitations. Gastrointestinal: Negative for abdominal pain, blood in stool, constipation, diarrhea, nausea and vomiting. Genitourinary: Negative for dysuria, enuresis, frequency and hematuria. Musculoskeletal: Negative for arthralgias and myalgias. R knee Pain    Skin: Negative for rash. Neurological: Negative for dizziness, weakness, light-headedness and headaches. Hematological: Does not bruise/bleed easily. Psychiatric/Behavioral: Negative for dysphoric mood and sleep disturbance. Objective :      Current Vitals : Temp: 98.1 °F (36.7 °C),  Pulse: 60, Resp: 18, BP: 136/63, SpO2: 94 %  Last 24 Hrs Vitals   Patient Vitals for the past 24 hrs:   BP Temp Temp src Pulse Resp SpO2 Height Weight   01/18/21 0807 136/63 98.1 °F (36.7 °C) Oral 60 18 94 %     01/18/21 0604 (!) 116/56 97.8 °F (36.6 °C) Oral 53 16 94 %     01/17/21 2239 (!) 128/57 98.1 °F (36.7 °C)  71 16 95 %     01/17/21 1515       5' 2\" (1.575 m) 183 lb (83 kg)   01/17/21 1511 (!) 164/71 98.1 °F (36.7 °C) Oral 64 16 100 %       Intake / output   No intake/output data recorded. Physical Exam:  Physical Exam  Vitals signs and nursing note reviewed. Value Ref Range Status   09/08/2020 1.010 1.005 - 1.030 Final     Protein, UA   Date Value Ref Range Status   09/08/2020 NEGATIVE NEGATIVE Final     RBC, UA   Date Value Ref Range Status   09/08/2020 2 TO 5 0 - 2 /HPF Final     Blood, UA POC   Date Value Ref Range Status   10/10/2019 25  Final     Bacteria, UA   Date Value Ref Range Status   09/08/2020 NOT REPORTED None Final     Nitrite, Urine   Date Value Ref Range Status   09/08/2020 NEGATIVE NEGATIVE Final     WBC, UA   Date Value Ref Range Status   09/08/2020 0 TO 2 0 - 5 /HPF Final     Leukocyte Esterase, Urine   Date Value Ref Range Status   09/08/2020 NEGATIVE NEGATIVE Final       Imaging / Clinical Data :-   Xr Femur Right (min 2 Views)    Result Date: 1/17/2021  No acute bony abnormalities are noted     Xr Knee Right (3 Views)    Result Date: 1/17/2021  No acute bony abnormalities are noted     Xr Hip 1 Vw W Pelvis Right    Result Date: 1/17/2021  No acute bony abnormalities are noted        Clinical Course : stable  Assessment and Plan  :        1. Right knee pain secondary to lateral meniscal tear -  Nonweightbearing. Percocet for pain control,   2. Age-related osteoporosis -vitamin D supplement, calcium supplement, Fosamax. Patient concerned due to history of GERD. Advised to take standing upright with ample water. 3. Major depression with anxiety disorder- -on clonazepam  4. Fibromyalgia  5. GERD-PPI. 6. Essential hypertension-continue lisinopril     Tito Miles was evaluated today and a DME order was entered for a wheeled walker because she requires this to successfully complete daily living tasks of ambulating. A wheeled walker is necessary due to the patient's unsteady gait, upper body weakness, and inability to  an ambulation device; and she can ambulate only by pushing a walker instead of a lesser assistive device such as a cane, crutch, or standard walker.   The need for this equipment was discussed with the patient and she understands and is in agreement. Discharge home, arrange home care. Continue to monitor vitals , Intake / output ,  Cell count , HGB , Kidney function, oxygenation  as indicated . Plan and updates discussed with patient ,  answers  explained to satisfaction.    Plan discussed with Staff Kofi Quintana RN     (Please note that portions of this note were completed with a voice recognition program. Efforts were made to edit the dictations but occasionally words are mis-transcribed.)      Yolis Mallory MD  1/18/2021

## 2021-01-18 NOTE — FLOWSHEET NOTE
Pt sleeping during time of attempted visit. Silent prayer said outside of pt room.        01/18/21 1424   Encounter Summary   Services provided to: Patient not available   Referral/Consult From: Luis Alfredo   Continue Visiting   (1/18/21 sleeping)   Complexity of Encounter Low   Routine   Type Initial   Assessment Sleeping     Electronically signed by Diane Duncan on 1/18/2021 at 2:25 PM.  Deshaun  [phone]

## 2021-01-18 NOTE — PROGRESS NOTES
Occupational Therapy   Occupational Therapy Initial Assessment  Date: 2021   Patient Name: Yeni Vivas  MRN: [id number]     : 1945    Date of Service: 2021    Discharge Recommendations:  Home with assist PRN, Home with Home health OT  OT Equipment Recommendations  Equipment Needed: Yes  Mobility Devices: ADL Assistive Devices; Cloteal Saner: Rolling(JR)  ADL Assistive Devices: Transfer Tub Bench  RN reports patient is medically stable for therapy treatment this date. Chart reviewed prior to treatment and patient is agreeable for therapy. All lines intact and patient positioned comfortably at end of treatment. All patient needs addressed prior to ending therapy session. Assessment   Performance deficits / Impairments: Decreased functional mobility ; Decreased ADL status; Decreased strength;Decreased safe awareness;Decreased balance;Decreased endurance  Prognosis: Good  Decision Making: High Complexity  OT Education: OT Role;Plan of Care;Home Exercise Program;Precautions; ADL Adaptive Strategies;Transfer Training;Energy Conservation;Equipment; Family Education  REQUIRES OT FOLLOW UP: Yes  Activity Tolerance  Activity Tolerance: Patient Tolerated treatment well  Safety Devices  Safety Devices in place: Yes  Type of devices: Nurse notified;Call light within reach; Patient at risk for falls;Gait belt;Left in bed           Patient Diagnosis(es): The primary encounter diagnosis was Acute pain of right knee. Diagnoses of Inability to ambulate due to right knee and Tear of lateral meniscus of right knee, current, unspecified tear type, initial encounter were also pertinent to this visit.      has a past medical history of Age-related osteoporosis without current pathological fracture, Arthritis, Fibromyalgia, GERD (gastroesophageal reflux disease), GERD (gastroesophageal reflux disease), Hyperlipidemia, Hypertension, Kidney stone, Pneumonia, Recurrent UTI, Sleep apnea, Vision abnormalities, and Wears glasses. has a past surgical history that includes Hysterectomy (); Appendectomy (); Tonsillectomy ();  section, classic (1964, 1972, & 1974); vitrectomy (Right, 2014); Colonoscopy; Colonoscopy (N/A, 10/31/2019); and Upper gastrointestinal endoscopy (N/A, 2020). Restrictions  Restrictions/Precautions  Restrictions/Precautions: Weight Bearing, General Precautions, Fall Risk  Lower Extremity Weight Bearing Restrictions  Right Lower Extremity Weight Bearing: Weight Bearing As Tolerated    Subjective   General  Chart Reviewed: Yes  Patient assessed for rehabilitation services?: Yes  Family / Caregiver Present: No  Vital Signs  Temp: 97.7 °F (36.5 °C)  Temp Source: Oral  Pulse: 58  Heart Rate Source: Monitor  Resp: 16  BP: 118/68  BP Location: Right Arm  MAP (mmHg): 80  Oxygen Therapy  SpO2: 96 %  O2 Device: None (Room air)  Social/Functional History  Social/Functional History  Lives With: Spouse, Family(2 adult grandkids)  Type of Home: House  Home Layout: Two level, Bed/Bath upstairs, 1/2 bath on main level  Home Access: Stairs to enter without rails  Entrance Stairs - Number of Steps: 2+1  Bathroom Shower/Tub: Tub/Shower unit  Bathroom Toilet: Handicap height(upstairs is standard)  Home Equipment: (no DME)  ADL Assistance: Independent  Homemaking Assistance: Independent  Ambulation Assistance: Independent  Transfer Assistance: Independent  Active : Yes  Occupation: Unemployed  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  Receptive to all education and wanting to be prepared for getting home  Balance  Sitting Balance: Stand by assistance  Standing Balance: Minimal assistance(x2 initially, progressed to CGA. Pt very nervous to start)  Functional Mobility  Functional - Mobility Device: Rolling Walker  Assist Level: Minimal assistance(x2 initially; assist for safe use of walker and for assuring safe wt. bearing with RLE. Pt is able to tolerate some weight with knee brace in place and use of UE's on RW. Pt able to complete mob in room distances and into hallway.)  Functional Mobility Comments: pt also assisted with trialing stairs as pt has 2+1 step into house. Pt min A x 2 for several trials of steps and education throughout on technique , fall prevention, and use of RW to approach steps and for placement of device by family member up onto porch/into house. Functional mob progressed to CGA by the time pt returned to room. Benefitted from multiple trials of sit to stand, mob, and lots of verbal education on techniques for home situations, inc. car transfer, tub transfer, recliner, toilet, etc. Pt remains guarded, but reassured she is able to navigate household distances. Toilet Transfers  Equipment Used: Standard toilet  Toilet Transfer: Minimal assistance  ADL  Feeding: Independent  Grooming: Independent;Setup  UE Bathing: Independent  LE Bathing: Minimal assistance; Moderate assistance  UE Dressing: Independent  LE Dressing: Moderate assistance  Toileting: Moderate assistance  Additional Comments: assist for R LE; pt educated on donning/doffing brace. Pt able to practice and demo correct position and tightness. Tone RUE  RUE Tone: Normotonic  Tone LUE  LUE Tone: Normotonic  Coordination  Movements Are Fluid And Coordinated: Yes     Bed mobility  Supine to Sit: Minimal assistance  Sit to Supine: Minimal assistance  Transfers  Sit to stand: Minimal assistance;2 Person assistance; Moderate assistance  Stand to sit: Minimal assistance;2 Person assistance; Moderate assistance  Transfer Comments: mod cues for hand placement, RW tech. Pt with R knee in extension d/t brace     Cognition  Overall Cognitive Status: WNL  Perception  Overall Perceptual Status: WFL     Sensation  Overall Sensation Status: WFL        LUE AROM (degrees)  LUE AROM : WFL  RUE AROM (degrees)  RUE AROM : WFL  LUE Strength  Gross LUE Strength: WFL  LUE Strength Comment: B UE's 4/5  RUE Strength  Gross RUE Strength: WFL                   Plan   Plan  Times per week: 4-5x/week, 1-2x/day  Current Treatment Recommendations: Strengthening, Balance Training, Functional Mobility Training, Endurance Training, Safety Education & Training, Self-Care / ADL, Patient/Caregiver Education & Training, Equipment Evaluation, Education, & procurement, Positioning         Goals  Short term goals  Time Frame for Short term goals: by discharge, pt will  Short term goal 1: demo SBA/CGA with ADL transfers with good follow through on safety techs, use of RW  Short term goal 2: demo SBA/CGA with functional mob in room distances for ADL completion with good safety/pacing  Short term goal 3: demo SBA with toileting routine with good safety  Short term goal 4: demo I with UB ADLs and CGA/min A with LB ADLs (inc. brace) with good safety, DME/AE as needed  Short term goal 5: demo and verb good understanding of fall prevention techs, EC/WS techs, equip needs, and B UE  HEP  Patient Goals   Patient goals : to go home       Therapy Time   Individual Concurrent Group Co-treatment   Time In 1000         Time Out 1117(+10 min chart review)         Minutes 77              Verbal edu provided to pt regarding fall prevention in the areas of community safety, transportation, proper footwear and clothing, reducing risk of falls, environmental modifications, importance of exercise, consequences of falling, plan if a fall occurs (\"rest and wait\" vs \"up and about\").     Pt would benefit from skilled home OT services in order to maximize occupational performance, safety, and independence in the home environment.      Ana Irvinchure, OT

## 2021-01-19 ENCOUNTER — CARE COORDINATION (OUTPATIENT)
Dept: CASE MANAGEMENT | Age: 76
End: 2021-01-19

## 2021-01-21 ENCOUNTER — TELEPHONE (OUTPATIENT)
Dept: GASTROENTEROLOGY | Age: 76
End: 2021-01-21

## 2021-01-21 DIAGNOSIS — M25.561 RIGHT KNEE PAIN, UNSPECIFIED CHRONICITY: Primary | ICD-10-CM

## 2021-01-21 NOTE — TELEPHONE ENCOUNTER
LVM for Roseanna to inform her that her follow up scheduled 6/17/21 has been changed from 11AM to 1PM due to a scheduling error.

## 2021-01-22 ENCOUNTER — OFFICE VISIT (OUTPATIENT)
Dept: ORTHOPEDIC SURGERY | Age: 76
End: 2021-01-22
Payer: MEDICARE

## 2021-01-22 VITALS
HEIGHT: 62 IN | HEART RATE: 85 BPM | WEIGHT: 183 LBS | TEMPERATURE: 97.1 F | BODY MASS INDEX: 33.68 KG/M2 | RESPIRATION RATE: 16 BRPM

## 2021-01-22 DIAGNOSIS — M17.11 PRIMARY OSTEOARTHRITIS OF RIGHT KNEE: ICD-10-CM

## 2021-01-22 DIAGNOSIS — S83.206D ACUTE MENISCAL TEAR OF RIGHT KNEE, SUBSEQUENT ENCOUNTER: Primary | ICD-10-CM

## 2021-01-22 PROCEDURE — 99213 OFFICE O/P EST LOW 20 MIN: CPT | Performed by: ORTHOPAEDIC SURGERY

## 2021-01-22 PROCEDURE — G8400 PT W/DXA NO RESULTS DOC: HCPCS | Performed by: ORTHOPAEDIC SURGERY

## 2021-01-22 PROCEDURE — G8484 FLU IMMUNIZE NO ADMIN: HCPCS | Performed by: ORTHOPAEDIC SURGERY

## 2021-01-22 PROCEDURE — G8427 DOCREV CUR MEDS BY ELIG CLIN: HCPCS | Performed by: ORTHOPAEDIC SURGERY

## 2021-01-22 PROCEDURE — 1090F PRES/ABSN URINE INCON ASSESS: CPT | Performed by: ORTHOPAEDIC SURGERY

## 2021-01-22 PROCEDURE — G8417 CALC BMI ABV UP PARAM F/U: HCPCS | Performed by: ORTHOPAEDIC SURGERY

## 2021-01-22 PROCEDURE — 20610 DRAIN/INJ JOINT/BURSA W/O US: CPT | Performed by: ORTHOPAEDIC SURGERY

## 2021-01-22 PROCEDURE — 4040F PNEUMOC VAC/ADMIN/RCVD: CPT | Performed by: ORTHOPAEDIC SURGERY

## 2021-01-22 PROCEDURE — 1123F ACP DISCUSS/DSCN MKR DOCD: CPT | Performed by: ORTHOPAEDIC SURGERY

## 2021-01-22 PROCEDURE — 1036F TOBACCO NON-USER: CPT | Performed by: ORTHOPAEDIC SURGERY

## 2021-01-22 PROCEDURE — 3017F COLORECTAL CA SCREEN DOC REV: CPT | Performed by: ORTHOPAEDIC SURGERY

## 2021-01-22 RX ORDER — TRIAMCINOLONE ACETONIDE 40 MG/ML
40 INJECTION, SUSPENSION INTRA-ARTICULAR; INTRAMUSCULAR ONCE
Status: COMPLETED | OUTPATIENT
Start: 2021-01-22 | End: 2021-01-22

## 2021-01-22 RX ORDER — LIDOCAINE HYDROCHLORIDE 10 MG/ML
4 INJECTION, SOLUTION INFILTRATION; PERINEURAL ONCE
Status: COMPLETED | OUTPATIENT
Start: 2021-01-22 | End: 2021-01-22

## 2021-01-22 RX ADMIN — LIDOCAINE HYDROCHLORIDE 4 ML: 10 INJECTION, SOLUTION INFILTRATION; PERINEURAL at 13:37

## 2021-01-22 RX ADMIN — TRIAMCINOLONE ACETONIDE 40 MG: 40 INJECTION, SUSPENSION INTRA-ARTICULAR; INTRAMUSCULAR at 13:37

## 2021-01-22 NOTE — PROGRESS NOTES
[facility]  [facility] Dong  [address] [place] [address]  Dept: [phone]    Ambulatory Orthopedic Consult      CHIEF COMPLAINT:    Chief Complaint   Patient presents with    Knee Pain     right knee       HISTORY OF PRESENT ILLNESS:      The patient is a 70 y.o. female who is being seen for consultation and evaluation of pain at the anterior knee, which began secondary to an injury on 1/17/2021. The pain is described mainly with mechanical terms (dull/sharp/throbbing). The pain is worse with activity and better with rest. The patient reports a progressive course. The patient has tried:      [x]  rest/activity modification          []  NSAIDs      []  opiates      []  orthotics        []  change in shoes   [x]  home exercises  []  physical therapy      []  CAM boot     [x]  brace:    []  injection:       []  surgery:      I recently saw the patient in the hospital for this problem. REVIEW OF SYSTEMS:  Constitutional: Negative for fever. HENT: Negative for tinnitus. Eyes: Negative for pain. Respiratory: Negative for shortness of breath. Cardiovascular: Negative for chest pain. Gastrointestinal: Negative for abdominal pain. Genitourinary: Negative for dysuria. Skin: Negative for rash. Neurological: Negative for headaches. Hematological: Does not bruise/bleed easily. Musculoskeletal: See HPI for pertinent positives     Past Medical History:    She  has a past medical history of Age-related osteoporosis without current pathological fracture, Arthritis (2012), Fibromyalgia (2012), GERD (gastroesophageal reflux disease) (DX PRIOR TO 2004), GERD (gastroesophageal reflux disease), Hyperlipidemia, Hypertension, Kidney stone (2012 & 2013), Pneumonia (2012), Recurrent UTI, Sleep apnea (2012), Vision abnormalities (2014), and Wears glasses.      Past Surgical History:    She  has a past surgical history that includes Hysterectomy (); Appendectomy (); Tonsillectomy ();  section, classic (1964, 1972, & 1974); vitrectomy (Right, 2014); Colonoscopy; Colonoscopy (N/A, 10/31/2019); and Upper gastrointestinal endoscopy (N/A, 2020). Current Medications:     Current Outpatient Medications:     oxyCODONE-acetaminophen (PERCOCET) 5-325 MG per tablet, Take 1 tablet by mouth every 4 hours as needed for Pain for up to 5 days. , Disp: 20 tablet, Rfl: 0    docusate sodium (COLACE) 100 MG capsule, Take 1 capsule by mouth 2 times daily, Disp: 60 capsule, Rfl: 0    omeprazole (PRILOSEC) 40 MG delayed release capsule, Take 1 capsule by mouth every morning (before breakfast) (Patient taking differently: Take 20 mg by mouth every morning (before breakfast) Indications: pt states 40 mg was too much for her ), Disp: 30 capsule, Rfl: 1    clonazePAM (KLONOPIN) 0.5 MG tablet, Take 1 tablet by mouth 2 times daily for 30 days. , Disp: 60 tablet, Rfl: 0    lisinopril (PRINIVIL;ZESTRIL) 10 MG tablet, Take 1 tablet by mouth daily, Disp: 30 tablet, Rfl: 0     Allergies:    Cefdinir, Morphine, Avelox [moxifloxacin], Ciprofloxacin, Quinolones, Statins, and Sulfa antibiotics    Family History:  family history includes Arthritis in her mother; Cancer in her maternal grandmother; No Known Problems in her father.     Social History:   Social History     Occupational History    Not on file   Tobacco Use    Smoking status: Former Smoker     Packs/day: 1.00     Years: 20.00     Pack years: 20.00     Quit date: 10/31/1994     Years since quittin.2    Smokeless tobacco: Never Used   Substance and Sexual Activity    Alcohol use: Yes     Comment: 2 DRINKS A WEEK    Drug use: No    Sexual activity: Not on file     Occupation: Retired     OBJECTIVE:  Pulse 85   Temp 97.1 °F (36.2 °C)   Resp 16   Ht 5' 2\" (1.575 m)   Wt 183 lb (83 kg)   LMP 10/31/1975 (Approximate)   BMI 33.47 kg/m²    Psych: alert and oriented to person, time, and place  Cardio:  well perfused extremities  Resp:  normal respiratory effort  Skin:  no cyanosis  Hem/lymph:  no lymphedema  Neuro:  sensation to light touch grossly intact throughout all nerve distributions in the foot   Musculoskeletal:    MUSCULOSKELETAL (right lower extremity):  Grossly normal hip/knee/ankle range of motion  Skin intact without erythema/warmth  Painless range of motion of above, but pain with deep flexion of knee  Neurovascularly intact distally in the lower extremity, able to fire FHL/EHL/TA/GCS  Stable hip and knee exam   Negative anterior drawer/Lachman's test/posterior drawer; No instability with varus/valgus stress at 0 and 30° of flexion  Appropriate strength on manual muscle testing of the iliopsoas, hamstrings, quadriceps, tibialis anterior, gastroc-soleus complex  Tenderness to palpation:  anterior knee joint line medially greater than laterally  -positive Gerry's test for pain      MUSCULOSKELETAL (left lower extremity):  Grossly normal hip/knee/ankle range of motion  Skin intact without erythema/warmth  Painless range of motion of above  Neurovascularly intact distally in the lower extremity, able to fire FHL/EHL/TA/GCS  Stable hip and knee exam  Negative anterior drawer/Lachman's test/posterior drawer; No instability with varus/valgus stress at 0 and 30° of flexion  Appropriate strength on manual muscle testing of the iliopsoas, hamstrings, quadriceps, tibialis anterior, gastroc-soleus complex  No significant tenderness to palpation over bony prominences       RADIOLOGY:   1/22/2021 FINDINGS:  Four weightbearing views (AP, Lateral, Tunnel and Sunrise) of the right knee were obtained in the office today and reviewed, revealing no acute fracture, dislocation, or radioopaque foreign body/tumor. Overall alignment is satisfactory. IMPRESSION: No acute fracture/dislocation.      Electronically signed by Dakota Belle MD        -MRI from 1/18/2021 reviewed, both images and report patient agrees with the above plan. The patient will return to clinic in 8 weeks without x-rays. At her next visit, depending on how she is doing, we may consider an arthroscopic meniscal debridement. KNEE INJECTION PROCEDURE NOTE: After discussing the risks/benefits/alternatives to injection, an informed consent was obtained verbally. The right knee was verified as the correct location and allergies were reviewed. The skin overlying the injection site was cleaned with an alcohol swab followed by a local prep with Chloraprep. A 22 gauge needle was introduced into the above location via the following approach:  superolateral. A mixture of 40 mg of Kenalog and 4 mL of 1% Lidocaine without epinephrine was injected. The patient was noted to tolerate the procedure well without immediate complication. Return in about 8 weeks (around 3/19/2021). Orders Placed This Encounter   Medications    triamcinolone acetonide (KENALOG-40) injection 40 mg    lidocaine 1 % injection 4 mL     Orders Placed This Encounter   Procedures    Ambulatory referral to Physical Therapy     Referral Priority:   Routine     Referral Type:   Eval and Treat     Referral Reason:   Specialty Services Required     Requested Specialty:   Physical Therapy     Number of Visits Requested:   1    DME Order for (Specify) as OP     - DME device ordered: Knee Sleeve  - Length of Need:  80         Earnest Delcid MD  Orthopedic Surgery        Please excuse any typos/errors, as this note was created with the assistance of voice recognition software. While intending to generate a document that actually reflects the content of the visit, the document can still have some errors including those of syntax and sound-a-like substitutions which may escape proof reading. In such instances, actual meaning can be extrapolated by context.

## 2021-01-22 NOTE — LETTER
Dr. Jaleel Chung  [address]  [phone]        1/22/2021     Patient: Traci Gasca  YOB: 1945    Dear Emmanuel Pierre MD,    I had the pleasure of seeing one of your patients, Traci Gasca recently in the office. Below are the relevant portions of my assessment and plan of care. ASSESSMENT AND PLAN:  She has right knee pain, with a lateral meniscal tear of the anterior horn, secondary to an injury that occurred 1/17/2021. She also has underlying tricompartmental arthritis noted on MRI with full-thickness cartilage loss underneath her patella. Notably, she has the past medical history as above. She has a history of COPD, major depressive disorder, and GERD. I had a long discussion today with the patient about the likely diagnosis and its natural history, physical exam and imaging findings, as well as treatment options in detail. We discussed rest/activity modification, swelling control, NSAIDs/Acetaminophen/topical anesthetics, bracing/immobilization, injections, and physical therapy. Surgically, we discussed a possible arthroscopic surgery. Orders/referrals were placed as below at today's visit. She was provided information on how to obtain an over-the-counter knee sleeve. I referred the patient to physical therapy for her right knee. I provided a prescription for Voltaren (4g TOPL q QID PRN pain). After discussing all the options as above, she also wished to proceed with a right knee injection as below. All questions were answered and the patient agrees with the above plan. The patient will return to clinic in 8 weeks without x-rays. At her next visit, depending on how she is doing, we may consider an arthroscopic meniscal debridement. I look forward to serving you and your patients again in the future. Please don't hesitate to contact me at my mobile number .         Mc Polanco MD  Orthopedic Surgery

## 2021-01-22 NOTE — CONSULTS
[facility]  [address] [address]  Dept: [phone]  Loc: [phone]    Inpatient Orthopedic Consult      CHIEF COMPLAINT:    right knee pain    HISTORY OF PRESENT ILLNESS:      The patient is a 70 y.o. female who is being seen for evaluation of pain at the above location, secondary to an injury that occurred 1/17/2020 secondary to an injury at home. The patient was brought to the emergency department, and Orthopaedics was consulted for evaluation and definitive treatment. The patient localizes the pain to the above location. Prior to this injury, the patient used no assistive devices for ambulation. She reports that she felt a pop and had immediate pain in her knee, greatest anteromedially.      REVIEW OF SYSTEMS:  Musculoskeletal: See HPI for pertinent positives     Past Medical History:    Past Medical History:   Diagnosis Date    Age-related osteoporosis without current pathological fracture     Arthritis 2012    GENERALIZED    Fibromyalgia 2012    GERD (gastroesophageal reflux disease) DX PRIOR TO 2004    ON RX     GERD (gastroesophageal reflux disease)     Hyperlipidemia     NO RX ALLERGIC TO STATINS    Hypertension     Kidney stone 2012 & 2013    X 2-SEVERAL STONES EACH TIME, PASSED ON OWN    Pneumonia 2012    Recurrent UTI     Sleep apnea 2012    HAS MACHINE BUT DOES NOT USE    Vision abnormalities 2014    RT DISTORTED, LT DECREASED    Wears glasses        Past Surgical History:    Past Surgical History:   Procedure Laterality Date    APPENDECTOMY  1963   [address], [place] [address] COLONOSCOPY N/A 10/31/2019    COLONOSCOPY WITH BIOPSY performed by Kimberly Anderson MD at [address] ENDOSCOPY N/A 11/12/2020    EGD ESOPHAGOGASTRODUODENOSCOPY performed by Kimberly Anderson MD at [address] Right 11/6/2014 Current Medications:   No current facility-administered medications for this encounter. Current Outpatient Medications   Medication Sig Dispense Refill    oxyCODONE-acetaminophen (PERCOCET) 5-325 MG per tablet Take 1 tablet by mouth every 4 hours as needed for Pain for up to 5 days. 20 tablet 0    docusate sodium (COLACE) 100 MG capsule Take 1 capsule by mouth 2 times daily 60 capsule 0    diclofenac sodium (VOLTAREN) 1 % GEL Apply 2 g topically 2 times daily 350 g 0    omeprazole (PRILOSEC) 40 MG delayed release capsule Take 1 capsule by mouth every morning (before breakfast) (Patient taking differently: Take 20 mg by mouth every morning (before breakfast) Indications: pt states 40 mg was too much for her ) 30 capsule 1    clonazePAM (KLONOPIN) 0.5 MG tablet Take 1 tablet by mouth 2 times daily for 30 days.  60 tablet 0    lisinopril (PRINIVIL;ZESTRIL) 10 MG tablet Take 1 tablet by mouth daily 30 tablet 0       Allergies:    Cefdinir, Morphine, Avelox [moxifloxacin], Ciprofloxacin, Quinolones, Statins, and Sulfa antibiotics    Family History:  Family History   Problem Relation Age of Onset    Arthritis Mother     No Known Problems Father     Cancer Maternal Grandmother         UNKNOWN       Social History:   Social History     Socioeconomic History    Marital status:      Spouse name: Not on file    Number of children: Not on file    Years of education: Not on file    Highest education level: Not on file   Occupational History    Not on file   Social Needs    Financial resource strain: Not on file    Food insecurity     Worry: Not on file     Inability: Not on file    Transportation needs     Medical: Not on file     Non-medical: Not on file   Tobacco Use    Smoking status: Former Smoker     Packs/day: 1.00     Years: 20.00     Pack years: 20.00     Quit date: 10/31/1994     Years since quittin.2    Smokeless tobacco: Never Used   Substance and Sexual Activity    Alcohol use: Yes     Comment: 2 DRINKS A WEEK    Drug use: No    Sexual activity: Not on file   Lifestyle    Physical activity     Days per week: Not on file     Minutes per session: Not on file    Stress: Not on file   Relationships    Social connections     Talks on phone: Not on file     Gets together: Not on file     Attends Hoahaoism service: Not on file     Active member of club or organization: Not on file     Attends meetings of clubs or organizations: Not on file     Relationship status: Not on file    Intimate partner violence     Fear of current or ex partner: Not on file     Emotionally abused: Not on file     Physically abused: Not on file     Forced sexual activity: Not on file   Other Topics Concern    Not on file   Social History Narrative    Not on file        OBJECTIVE:  /68   Pulse 58   Temp 97.7 °F (36.5 °C) (Oral)   Resp 16   Ht 5' 2\" (1.575 m)   Wt 183 lb (83 kg)   LMP 10/31/1975 (Approximate)   SpO2 96%   BMI 33.47 kg/m²    Psych: awake, alert, no acute distress and alert and oriented to person, time, and place. Cardio:  well perfused extremities  Resp:  normal respiratory effort  Skin:  no cyanosis  Hem/lymph:  no lymphedema  Neuro:  sensation to light touch intact throughout all nerve distributions in the foot   Musculoskeletal:    MUSCULOSKELETAL (right lower extremity):  Grossly normal hip/knee/ankle range of motion  Skin intact without erythema/warmth  Painless range of motion of above, but pain with deep flexion of knee  Neurovascularly intact distally in the lower extremity, able to fire FHL/EHL/TA/GCS  Stable hip and knee exam   Negative anterior drawer/Lachman's test/posterior drawer;  No instability with varus/valgus stress at 0 and 30° of flexion  Appropriate strength on manual muscle testing of the iliopsoas, hamstrings, quadriceps, tibialis anterior, gastroc-soleus complex  Tenderness to palpation:  anterior knee joint line   -positive Gerry's test for pain      MUSCULOSKELETAL (left lower extremity):  Grossly normal hip/knee/ankle range of motion  Skin intact without erythema/warmth  Painless range of motion of above  Neurovascularly intact distally in the lower extremity, able to fire FHL/EHL/TA/GCS  Stable hip and knee exam  Negative anterior drawer/Lachman's test/posterior drawer; No instability with varus/valgus stress at 0 and 30° of flexion  Appropriate strength on manual muscle testing of the iliopsoas, hamstrings, quadriceps, tibialis anterior, gastroc-soleus complex  No significant tenderness to palpation over bony prominences       Secondary survey exam:   No gross deformity, erythema, or significant tenderness of other bony prominences/joints noted  No tenderness to palpation over other bony prominences/joints of the bilateral upper and lower extremities   Log roll test negative on contralateral hip  Grossly neurovascularly intact bilateral upper extremity without focal deficit      RADIOLOGY:   Radiographic images and reports reviewed. Xr Femur Right (min 2 Views)    Result Date: 1/17/2021  EXAMINATION: ONE XRAY VIEW OF THE PELVIS AND TWO XRAY VIEWS RIGHT HIP;   XRAY VIEWS OF THE RIGHT FEMUR; THREE XRAY VIEWS OF THE RIGHT KNEE 1/17/2021 12:49 pm COMPARISON: None. HISTORY: ORDERING SYSTEM PROVIDED HISTORY: pain TECHNOLOGIST PROVIDED HISTORY: pain Reason for Exam: Pt states felt \"popping\" sensation in right knee today after bending down. Acuity: Acute Type of Exam: Initial; ORDERING SYSTEM PROVIDED HISTORY: pain TECHNOLOGIST PROVIDED HISTORY: pain Reason for Exam: Pt states felt \"popping\" sensation in knee today after bending down. Acuity: Acute Type of Exam: Initial FINDINGS: The visualized bones are normal. There is no evidence of fracture or dislocation. Mild chondrocalcinosis of the knee. Spurring of the patella. Osteitis pubis. The  joint spaces appear well maintained. The soft tissues are unremarkable.      No acute bony abnormalities are noted     Xr Knee Right (3 Views)    Result Date: 1/17/2021  EXAMINATION: ONE XRAY VIEW OF THE PELVIS AND TWO XRAY VIEWS RIGHT HIP;   XRAY VIEWS OF THE RIGHT FEMUR; THREE XRAY VIEWS OF THE RIGHT KNEE 1/17/2021 12:49 pm COMPARISON: None. HISTORY: ORDERING SYSTEM PROVIDED HISTORY: pain TECHNOLOGIST PROVIDED HISTORY: pain Reason for Exam: Pt states felt \"popping\" sensation in right knee today after bending down. Acuity: Acute Type of Exam: Initial; ORDERING SYSTEM PROVIDED HISTORY: pain TECHNOLOGIST PROVIDED HISTORY: pain Reason for Exam: Pt states felt \"popping\" sensation in knee today after bending down. Acuity: Acute Type of Exam: Initial FINDINGS: The visualized bones are normal. There is no evidence of fracture or dislocation. Mild chondrocalcinosis of the knee. Spurring of the patella. Osteitis pubis. The  joint spaces appear well maintained. The soft tissues are unremarkable. No acute bony abnormalities are noted     Xr Knee Right (min 4 Views)    Result Date: 1/22/2021 1/22/2021 FINDINGS:  Four weightbearing views (AP, Lateral, Tunnel and Sunrise) of the right knee were obtained in the office today and reviewed, revealing no acute fracture, dislocation, or radioopaque foreign body/tumor.  Overall alignment is satisfactory.       No acute fracture/dislocation.   Electronically signed by Tracey Miguel MD      Mri Knee Right Wo Contrast    Result Date: 1/18/2021  EXAMINATION: MRI OF THE RIGHT KNEE WITHOUT CONTRAST, 1/18/2021 8:32 am TECHNIQUE: Multiplanar multisequence MRI of the right knee was performed without the administration of intravenous contrast. COMPARISON: Radiographs 01/17/2021 HISTORY: ORDERING SYSTEM PROVIDED HISTORY: R knee injury TECHNOLOGIST PROVIDED HISTORY: R knee injury contrast if radiology recommends Reason for Exam: Pt fell at home and heard a pop in right knee Acuity: Chronic Type of Exam: Subsequent/Follow-up Additional signs and symptoms: right knee pain and swelling Relevant Medical/Surgical History: unable to bear wt FINDINGS: MENISCI: There is abnormal signal and morphology of the lateral meniscus anterior horn extending to the anterior root attachment. No medial meniscus tear identified. CRUCIATE LIGAMENTS: ACL and PCL are intact. EXTENSOR MECHANISM: Patellar and distal quadriceps tendons are intact. The medial and lateral patellar retinaculum are intact. LATERAL COLLATERAL LIGAMENT COMPLEX: Iliotibial band, fibular collateral ligament, and biceps femoris tendon are intact. MEDIAL COLLATERAL LIGAMENT COMPLEX: MCL is intact. KNEE JOINT: Small knee joint effusion. Tiny Jones's cyst.  Tricompartmental osteoarthrosis is seen. Severe cartilage thinning of the patella with focal areas of full-thickness cartilage loss and subchondral reactive marrow changes. There is cartilage thinning of the trochlea. Partial-thickness cartilage loss is seen at the medial femoral condyle and lateral tibial plateau. BONE MARROW: No acute fracture or significant bone marrow edema. Abnormal signal and morphology of the lateral meniscus anterior horn extending to the anterior root attachment suspicious for a tear. No medial meniscus tear identified. Cruciate and collateral ligaments are intact. No acute fracture or significant bone marrow edema. Small knee joint effusion. Tricompartmental osteoarthrosis, most evident at the patella where there are areas of full-thickness cartilage loss. Xr Hip 1 Vw W Pelvis Right    Result Date: 1/17/2021  EXAMINATION: ONE XRAY VIEW OF THE PELVIS AND TWO XRAY VIEWS RIGHT HIP;   XRAY VIEWS OF THE RIGHT FEMUR; THREE XRAY VIEWS OF THE RIGHT KNEE 1/17/2021 12:49 pm COMPARISON: None. HISTORY: ORDERING SYSTEM PROVIDED HISTORY: pain TECHNOLOGIST PROVIDED HISTORY: pain Reason for Exam: Pt states felt \"popping\" sensation in right knee today after bending down.  Acuity: Acute Type of Exam: Initial; ORDERING SYSTEM PROVIDED HISTORY: pain TECHNOLOGIST PROVIDED HISTORY: pain Reason for Exam: Pt states felt \"popping\" sensation in knee today after bending down. Acuity: Acute Type of Exam: Initial FINDINGS: The visualized bones are normal. There is no evidence of fracture or dislocation. Mild chondrocalcinosis of the knee. Spurring of the patella. Osteitis pubis. The  joint spaces appear well maintained. The soft tissues are unremarkable. No acute bony abnormalities are noted         ASSESSMENT AND PLAN:  Body mass index is 33.47 kg/m². She has right knee pain, secondary to a lateral meniscal tear of the anterior horn, secondary to an injury that occurred 1/17/2021. She also has underlying tricompartmental osteoarthritis noted on MRI with full-thickness cartilage loss underneath her patella. Notably, she has the past medical history as above. She has a history of COPD, major depressive disorder, and GERD. We had a discussion today about the likely diagnosis and its natural history, physical exam and imaging findings, as well as various treatment options in detail. Surgically, we discussed that a possible future surgery may be beneficial, however no surgery is planned during this inpatient stay. -DVT prophylaxis in the hospital  -pain control  -PT/OT  -WBAT with walker    All questions were answered and the patient agrees with the above plan. I would like to see her in my office in 1 week. Shaan Aggarwal MD  Orthopedic Surgery        Please excuse any typos/errors, as this note was created with the assistance of voice recognition software. While intending to generate a document that actually reflects the content of the visit, the document can still have some errors including those of syntax and sound-a-like substitutions which may escape proof reading. In such instances, actual meaning can be extrapolated by context.

## 2021-01-26 ENCOUNTER — CARE COORDINATION (OUTPATIENT)
Dept: CASE MANAGEMENT | Age: 76
End: 2021-01-26

## 2021-01-26 NOTE — CARE COORDINATION
[facility] Transitions Follow Up Call- final COVID risk follow up call       2021    Patient: Piedad Reeves  Patient : 1945   MRN: [id number]  Reason for Admission: acute right knee pain, inability to ambulate   Discharge Date: 21 RARS: No data recorded       Spoke with: Renowiltonnithya Garciaeveline     Call to pt who states she is doing \"not bad\". Rates pain in right knee 3/10 and she has only been taking tylenol for the pain which has been sufficient  States she is getting around well   States incision looks good and she has a new brace doctor suggested for her to keep it in place  Denies needs    You Patient resolved from the Care Transitions episode on 2021  Discussed COVID-19 related testing which was available at this time. Test results were negative. Patient informed of results, if available? N/a     Patient/family has been provided the following resources and education related to COVID-19:                         Signs, symptoms and red flags related to COVID-19            CDC exposure and quarantine guidelines            [facility] exposure contact - [phone]            Contact for their local Department of Health                 Patient currently reports that the following symptoms have improved:  fatigue, pain or aching joints and no new/worsening symptoms     No further outreach scheduled with this CTN/ACM. Episode of Care resolved. Patient has this CTN/ACM contact information if future needs arise. Care Transitions Subsequent and Final Call    Subsequent and Final Calls  Do you have any ongoing symptoms?: No  Have your medications changed?: No  Do you have any questions related to your medications?: No  Do you currently have any active services?: No  Are you currently active with any services?: Home Health  Do you have any needs or concerns that I can assist you with?: No  Identified Barriers: Lack of Education  Care Transitions Interventions  Other Interventions:            Follow Up  Future Appointments   Date Time Provider Dirk Jordan   3/19/2021  1:15 PM Ramiro Romberg, MD [facility]   6/17/2021  1:00 PM MD charissa Zepeda RN

## 2021-01-27 DIAGNOSIS — K21.9 GASTROESOPHAGEAL REFLUX DISEASE WITHOUT ESOPHAGITIS: ICD-10-CM

## 2021-01-27 RX ORDER — OMEPRAZOLE 40 MG/1
40 CAPSULE, DELAYED RELEASE ORAL
Qty: 30 CAPSULE | Refills: 11 | Status: SHIPPED | OUTPATIENT
Start: 2021-01-27 | End: 2021-03-31

## 2021-03-26 ENCOUNTER — OFFICE VISIT (OUTPATIENT)
Dept: ORTHOPEDIC SURGERY | Age: 76
End: 2021-03-26
Payer: MEDICARE

## 2021-03-26 VITALS — HEART RATE: 96 BPM | WEIGHT: 188.2 LBS | BODY MASS INDEX: 34.63 KG/M2 | HEIGHT: 62 IN | TEMPERATURE: 97.3 F

## 2021-03-26 DIAGNOSIS — M17.11 PRIMARY OSTEOARTHRITIS OF RIGHT KNEE: Primary | ICD-10-CM

## 2021-03-26 DIAGNOSIS — M17.11 PRIMARY OSTEOARTHRITIS OF RIGHT KNEE: ICD-10-CM

## 2021-03-26 DIAGNOSIS — S83.206D ACUTE MENISCAL TEAR OF RIGHT KNEE, SUBSEQUENT ENCOUNTER: Primary | ICD-10-CM

## 2021-03-26 PROCEDURE — G8484 FLU IMMUNIZE NO ADMIN: HCPCS | Performed by: ORTHOPAEDIC SURGERY

## 2021-03-26 PROCEDURE — G8417 CALC BMI ABV UP PARAM F/U: HCPCS | Performed by: ORTHOPAEDIC SURGERY

## 2021-03-26 PROCEDURE — 3017F COLORECTAL CA SCREEN DOC REV: CPT | Performed by: ORTHOPAEDIC SURGERY

## 2021-03-26 PROCEDURE — 1090F PRES/ABSN URINE INCON ASSESS: CPT | Performed by: ORTHOPAEDIC SURGERY

## 2021-03-26 PROCEDURE — 99214 OFFICE O/P EST MOD 30 MIN: CPT | Performed by: ORTHOPAEDIC SURGERY

## 2021-03-26 PROCEDURE — G8400 PT W/DXA NO RESULTS DOC: HCPCS | Performed by: ORTHOPAEDIC SURGERY

## 2021-03-26 PROCEDURE — 1036F TOBACCO NON-USER: CPT | Performed by: ORTHOPAEDIC SURGERY

## 2021-03-26 PROCEDURE — G8427 DOCREV CUR MEDS BY ELIG CLIN: HCPCS | Performed by: ORTHOPAEDIC SURGERY

## 2021-03-26 PROCEDURE — 1123F ACP DISCUSS/DSCN MKR DOCD: CPT | Performed by: ORTHOPAEDIC SURGERY

## 2021-03-26 PROCEDURE — 4040F PNEUMOC VAC/ADMIN/RCVD: CPT | Performed by: ORTHOPAEDIC SURGERY

## 2021-03-26 NOTE — PROGRESS NOTES
[facility]  [facility] Yareli Biswas  [address] [place] [address]  Dept: [phone]    Ambulatory Orthopedic Consult      CHIEF COMPLAINT:    Chief Complaint   Patient presents with    Knee Pain     right knee       HISTORY OF PRESENT ILLNESS:      The patient is a 70 y.o. female who is being seen for consultation and evaluation of pain at the anterior knee, which began secondary to an injury on 1/17/2021. The pain is described mainly with mechanical terms (dull/sharp/throbbing). The pain is worse with activity and better with rest. The patient reports a progressive course. The patient has tried:      [x]  rest/activity modification          []  NSAIDs      []  opiates      []  orthotics        []  change in shoes   [x]  home exercises  []  physical therapy      []  CAM boot     [x]  brace:    []  injection:       []  surgery:      I recently saw the patient in the hospital for this problem. INTERVAL HISTORY 3/26/2021:  She is seen again today in the office for follow up of a previous issue (as above). Since being seen last, the patient is doing better. At today's visit, she is not using a brace or assistive device. The location and quality of the pain have not significantly changed since the last visit. REVIEW OF SYSTEMS:  Constitutional: Negative for fever. HENT: Negative for tinnitus. Eyes: Negative for pain. Respiratory: Negative for shortness of breath. Cardiovascular: Negative for chest pain. Gastrointestinal: Negative for abdominal pain. Genitourinary: Negative for dysuria. Skin: Negative for rash. Neurological: Negative for headaches. Hematological: Does not bruise/bleed easily.    Musculoskeletal: See HPI for pertinent positives     Past Medical History:    She  has a past medical history of Age-related osteoporosis without current pathological fracture, Arthritis (2012), Fibromyalgia (2012), GERD (gastroesophageal reflux disease) (DX PRIOR TO ), GERD (gastroesophageal reflux disease), Hyperlipidemia, Hypertension, Kidney stone ( & ), Pneumonia (), Recurrent UTI, Sleep apnea (), Vision abnormalities (), and Wears glasses. Past Surgical History:    She  has a past surgical history that includes Hysterectomy (); Appendectomy (); Tonsillectomy ();  section, classic (1964, 1972, & 1974); vitrectomy (Right, 2014); Colonoscopy; Colonoscopy (N/A, 10/31/2019); and Upper gastrointestinal endoscopy (N/A, 2020). Current Medications:     Current Outpatient Medications:     omeprazole (PRILOSEC) 40 MG delayed release capsule, Take 1 capsule by mouth every morning (before breakfast), Disp: 30 capsule, Rfl: 11    clonazePAM (KLONOPIN) 0.5 MG tablet, Take 1 tablet by mouth 2 times daily for 30 days. , Disp: 60 tablet, Rfl: 0    lisinopril (PRINIVIL;ZESTRIL) 10 MG tablet, Take 1 tablet by mouth daily, Disp: 30 tablet, Rfl: 0    diclofenac sodium (VOLTAREN) 1 % GEL, Apply 2 g topically 2 times daily (Patient not taking: Reported on 3/26/2021), Disp: 350 g, Rfl: 0     Allergies:    Cefdinir, Morphine, Avelox [moxifloxacin], Ciprofloxacin, Quinolones, Statins, and Sulfa antibiotics    Family History:  family history includes Arthritis in her mother; Cancer in her maternal grandmother; No Known Problems in her father.     Social History:   Social History     Occupational History    Not on file   Tobacco Use    Smoking status: Former Smoker     Packs/day: 1.00     Years: 20.00     Pack years: 20.00     Quit date: 10/31/1994     Years since quittin.4    Smokeless tobacco: Never Used   Substance and Sexual Activity    Alcohol use: Yes     Comment: 2 DRINKS A WEEK    Drug use: No    Sexual activity: Not on file     Occupation: Retired     OBJECTIVE:  Pulse 96   Temp 97.3 °F (36.3 °C)   Ht 5' 2\" (1.575 m)   Wt 188 lb 3.2 oz (85.4 kg)   LMP 10/31/1975 (Approximate)   BMI 34.42 kg/m²    Psych: alert and oriented to person, time, and place  Cardio:  well perfused extremities  Resp:  normal respiratory effort  Skin:  no cyanosis  Hem/lymph:  no lymphedema  Neuro:  sensation to light touch grossly intact throughout all nerve distributions in the foot   Musculoskeletal:    MUSCULOSKELETAL (right lower extremity):  Grossly normal hip/knee/ankle range of motion  Skin intact without erythema/warmth  Painless range of motion of above, but pain with deep flexion of knee  Neurovascularly intact distally in the lower extremity, able to fire FHL/EHL/TA/GCS  Stable hip and knee exam   Negative anterior drawer/Lachman's test/posterior drawer; No instability with varus/valgus stress at 0 and 30° of flexion  Appropriate strength on manual muscle testing of the iliopsoas, hamstrings, quadriceps, tibialis anterior, gastroc-soleus complex  Tenderness to palpation:  anterior knee joint line medially greater than laterally--improved  -positive Gerry's test for pain      MUSCULOSKELETAL (left lower extremity):  Grossly normal hip/knee/ankle range of motion  Skin intact without erythema/warmth  Painless range of motion of above  Neurovascularly intact distally in the lower extremity, able to fire FHL/EHL/TA/GCS  Stable hip and knee exam  Negative anterior drawer/Lachman's test/posterior drawer; No instability with varus/valgus stress at 0 and 30° of flexion  Appropriate strength on manual muscle testing of the iliopsoas, hamstrings, quadriceps, tibialis anterior, gastroc-soleus complex  No significant tenderness to palpation over bony prominences       RADIOLOGY:   3/26/2021 No new radiology images today. Prior images reviewed for reference. FINDINGS:  Four weightbearing views (AP, Lateral, Tunnel and Sunrise) of the right knee were obtained in the office today and reviewed, revealing no acute fracture, dislocation, or radioopaque foreign body/tumor. Overall alignment is satisfactory. IMPRESSION: No acute fracture/dislocation. Electronically signed by Harman Blackmon MD        -MRI from 1/18/2021 reviewed, both images and report as below:  Abnormal signal and morphology of the lateral meniscus anterior horn   extending to the anterior root attachment suspicious for a tear.  No medial   meniscus tear identified.       Cruciate and collateral ligaments are intact.       No acute fracture or significant bone marrow edema.       Small knee joint effusion.       Tricompartmental osteoarthrosis, most evident at the patella where there are   areas of full-thickness cartilage loss.           ASSESSMENT AND PLAN:  Maryrose Spatz was seen today for Knee Pain (right knee)  The primary encounter diagnosis was Acute meniscal tear of right knee, subsequent encounter. A diagnosis of Primary osteoarthritis of right knee was also pertinent to this visit. Body mass index is 34.42 kg/m². She has right knee pain, with a lateral meniscal tear of the anterior horn, secondary to an injury that occurred 1/17/2021. She also has underlying tricompartmental arthritis noted on MRI with full-thickness cartilage loss underneath her patella. A right knee steroid injection on 1/22/2021 helped her pain approximately 75%. Notably, she has the past medical history as above. She has a history of COPD, major depressive disorder, and GERD. I had another discussion today with the patient about the likely diagnosis and its natural history, physical exam and imaging findings, as well as treatment options in detail. We discussed rest/activity modification, swelling control, NSAIDs/Acetaminophen/topical anesthetics, bracing/immobilization, injections, and physical therapy. Surgically, we discussed a possible arthroscopic surgery. At this time, she reports she is doing much better due to the injection, and we decided to continue with conservative management for the time being.       Orders/referrals were placed as below at today's visit. She was again provided a prescription for Voltaren gel, and we discussed obtaining this (she reports she was unable to find it after her last prescription). She will continue to do home exercises per physical therapy for her right knee, and may use her over-the-counter knee sleeve as needed. All questions were answered and the patient agrees with the above plan. The patient will return to clinic in the future as needed with right shoulder x-rays. At her next visit, depending how she is doing, we will evaluate her right shoulder, and may consider a right shoulder injection (she reports she is previously performed physical therapy for her right shoulder), possibly repeat the right knee injection and/or consider a right knee arthroscopic meniscal debridement as above. Return if symptoms worsen or fail to improve. No orders of the defined types were placed in this encounter. No orders of the defined types were placed in this encounter. Zelda Landin MD  Orthopedic Surgery        Please excuse any typos/errors, as this note was created with the assistance of voice recognition software. While intending to generate a document that actually reflects the content of the visit, the document can still have some errors including those of syntax and sound-a-like substitutions which may escape proof reading. In such instances, actual meaning can be extrapolated by context.

## 2021-03-31 ENCOUNTER — TELEPHONE (OUTPATIENT)
Dept: GASTROENTEROLOGY | Age: 76
End: 2021-03-31

## 2021-03-31 DIAGNOSIS — K21.9 GASTROESOPHAGEAL REFLUX DISEASE WITHOUT ESOPHAGITIS: Primary | ICD-10-CM

## 2021-03-31 RX ORDER — OMEPRAZOLE 20 MG/1
20 CAPSULE, DELAYED RELEASE ORAL DAILY
Qty: 90 CAPSULE | Refills: 2 | Status: SHIPPED | OUTPATIENT
Start: 2021-03-31 | End: 2021-04-01

## 2021-03-31 NOTE — TELEPHONE ENCOUNTER
Patient called stating the Omeprazole 40 MG is to strong for her stomach an would like to go back down to 20.

## 2021-04-01 RX ORDER — OMEPRAZOLE 20 MG/1
20 CAPSULE, DELAYED RELEASE ORAL DAILY
Qty: 90 CAPSULE | Refills: 3 | Status: SHIPPED | OUTPATIENT
Start: 2021-04-01

## 2021-06-11 ENCOUNTER — OFFICE VISIT (OUTPATIENT)
Dept: ORTHOPEDIC SURGERY | Age: 76
End: 2021-06-11
Payer: MEDICARE

## 2021-06-11 VITALS — BODY MASS INDEX: 34.6 KG/M2 | HEIGHT: 62 IN | WEIGHT: 188 LBS | TEMPERATURE: 97.4 F | RESPIRATION RATE: 12 BRPM

## 2021-06-11 DIAGNOSIS — S83.206D ACUTE MENISCAL TEAR OF RIGHT KNEE, SUBSEQUENT ENCOUNTER: ICD-10-CM

## 2021-06-11 DIAGNOSIS — M17.0 PRIMARY OSTEOARTHRITIS OF BOTH KNEES: Primary | ICD-10-CM

## 2021-06-11 DIAGNOSIS — M25.511 RIGHT SHOULDER PAIN, UNSPECIFIED CHRONICITY: Primary | ICD-10-CM

## 2021-06-11 PROCEDURE — G8417 CALC BMI ABV UP PARAM F/U: HCPCS | Performed by: ORTHOPAEDIC SURGERY

## 2021-06-11 PROCEDURE — 1090F PRES/ABSN URINE INCON ASSESS: CPT | Performed by: ORTHOPAEDIC SURGERY

## 2021-06-11 PROCEDURE — 1036F TOBACCO NON-USER: CPT | Performed by: ORTHOPAEDIC SURGERY

## 2021-06-11 PROCEDURE — 99213 OFFICE O/P EST LOW 20 MIN: CPT | Performed by: ORTHOPAEDIC SURGERY

## 2021-06-11 PROCEDURE — 4040F PNEUMOC VAC/ADMIN/RCVD: CPT | Performed by: ORTHOPAEDIC SURGERY

## 2021-06-11 PROCEDURE — G8400 PT W/DXA NO RESULTS DOC: HCPCS | Performed by: ORTHOPAEDIC SURGERY

## 2021-06-11 PROCEDURE — 3017F COLORECTAL CA SCREEN DOC REV: CPT | Performed by: ORTHOPAEDIC SURGERY

## 2021-06-11 PROCEDURE — G8427 DOCREV CUR MEDS BY ELIG CLIN: HCPCS | Performed by: ORTHOPAEDIC SURGERY

## 2021-06-11 PROCEDURE — 20610 DRAIN/INJ JOINT/BURSA W/O US: CPT | Performed by: ORTHOPAEDIC SURGERY

## 2021-06-11 PROCEDURE — 1123F ACP DISCUSS/DSCN MKR DOCD: CPT | Performed by: ORTHOPAEDIC SURGERY

## 2021-06-11 RX ORDER — TRIAMCINOLONE ACETONIDE 40 MG/ML
40 INJECTION, SUSPENSION INTRA-ARTICULAR; INTRAMUSCULAR ONCE
Status: COMPLETED | OUTPATIENT
Start: 2021-06-11 | End: 2021-06-11

## 2021-06-11 RX ORDER — LIDOCAINE HYDROCHLORIDE 10 MG/ML
8 INJECTION, SOLUTION INFILTRATION; PERINEURAL ONCE
Status: COMPLETED | OUTPATIENT
Start: 2021-06-11 | End: 2021-06-11

## 2021-06-11 RX ADMIN — LIDOCAINE HYDROCHLORIDE 8 ML: 10 INJECTION, SOLUTION INFILTRATION; PERINEURAL at 11:50

## 2021-06-11 RX ADMIN — TRIAMCINOLONE ACETONIDE 40 MG: 40 INJECTION, SUSPENSION INTRA-ARTICULAR; INTRAMUSCULAR at 11:29

## 2021-06-11 NOTE — PROGRESS NOTES
[facility]  [facility] Kristofer Sat  [address] [place] [address]  Dept: [phone]    Ambulatory Orthopedic Consult      CHIEF COMPLAINT:    Chief Complaint   Patient presents with    Knee Pain     bilateral        HISTORY OF PRESENT ILLNESS:      The patient is a 70 y.o. female who is being seen for consultation and evaluation of pain at the anterior knee, which began secondary to an injury on 1/17/2021. The pain is described mainly with mechanical terms (dull/sharp/throbbing). The pain is worse with activity and better with rest. The patient reports a progressive course. The patient has tried:      [x]  rest/activity modification          []  NSAIDs      []  opiates      []  orthotics        []  change in shoes   [x]  home exercises  []  physical therapy      []  CAM boot     [x]  brace:    []  injection:       []  surgery:      I recently saw the patient in the hospital for this problem. INTERVAL HISTORY 3/26/2021:  She is seen again today in the office for follow up of a previous issue (as above). Since being seen last, the patient is doing better. At today's visit, she is not using a brace or assistive device. The location and quality of the pain have not significantly changed since the last visit. INTERVAL HISTORY 6/11/2021:  She is seen again today in the office for evaluation of a new issue as above, which began many years ago atraumatically (localizes the pain to her left knee medially greater than laterally)  . At today's visit, she is using no brace/assistive device. History is obtained today from:   [x]  the patient     []  EMR     []  one family member/friend    []  multiple family members/friends    []  other:      Regarding the previous issue, she reports the previous problem is doing worse. REVIEW OF SYSTEMS:  Constitutional: Negative for fever. HENT: Negative for tinnitus. Eyes: Negative for pain. Respiratory: Negative for shortness of breath. Cardiovascular: Negative for chest pain. Gastrointestinal: Negative for abdominal pain. Genitourinary: Negative for dysuria. Skin: Negative for rash. Neurological: Negative for headaches. Hematological: Does not bruise/bleed easily. Musculoskeletal: See HPI for pertinent positives     Past Medical History:    She  has a past medical history of Age-related osteoporosis without current pathological fracture, Arthritis (), Fibromyalgia (), GERD (gastroesophageal reflux disease) (DX PRIOR TO ), GERD (gastroesophageal reflux disease), Hyperlipidemia, Hypertension, Kidney stone ( & ), Pneumonia (), Recurrent UTI, Sleep apnea (), Vision abnormalities (), and Wears glasses. Past Surgical History:    She  has a past surgical history that includes Hysterectomy (); Appendectomy (); Tonsillectomy ();  section, classic (, 1972, & 1974); vitrectomy (Right, 2014); Colonoscopy; Colonoscopy (N/A, 10/31/2019); and Upper gastrointestinal endoscopy (N/A, 2020). Current Medications:     Current Outpatient Medications:     omeprazole (PRILOSEC) 20 MG delayed release capsule, Take 1 capsule by mouth Daily 30 minutes before dinner, Disp: 90 capsule, Rfl: 3    diclofenac sodium (VOLTAREN) 1 % GEL, Apply 2 g topically 2 times daily, Disp: 350 g, Rfl: 0    diclofenac sodium (VOLTAREN) 1 % GEL, Apply 2 g topically 2 times daily (Patient not taking: Reported on 3/26/2021), Disp: 350 g, Rfl: 0    clonazePAM (KLONOPIN) 0.5 MG tablet, Take 1 tablet by mouth 2 times daily for 30 days. , Disp: 60 tablet, Rfl: 0    lisinopril (PRINIVIL;ZESTRIL) 10 MG tablet, Take 1 tablet by mouth daily, Disp: 30 tablet, Rfl: 0     Allergies:    Cefdinir, Morphine, Avelox [moxifloxacin], Ciprofloxacin, Quinolones, Statins, and Sulfa antibiotics    Family History:  family history includes Arthritis in her mother; Cancer in her maternal grandmother; No Known Problems in her father. Social History:   Social History     Occupational History    Not on file   Tobacco Use    Smoking status: Former Smoker     Packs/day: 1.00     Years: 20.00     Pack years: 20.00     Quit date: 10/31/1994     Years since quittin.6    Smokeless tobacco: Never Used   Vaping Use    Vaping Use: Never used   Substance and Sexual Activity    Alcohol use: Yes     Comment: 2 DRINKS A WEEK    Drug use: No    Sexual activity: Not on file     Occupation: Retired     OBJECTIVE:  Temp 97.4 °F (36.3 °C)   Resp 12   Ht 5' 2\" (1.575 m)   Wt 188 lb (85.3 kg)   LMP 10/31/1975 (Approximate)   BMI 34.39 kg/m²    Psych: alert and oriented to person, time, and place  Cardio:  well perfused extremities  Resp:  normal respiratory effort  Skin:  no cyanosis  Hem/lymph:  no lymphedema  Neuro:  sensation to light touch grossly intact throughout all nerve distributions in the foot   Musculoskeletal:    RLE:  Vascular: Limb well perfused, compartments soft/compressible. Skin: No erythema/ulcers. Intact. Neurovascular Status:  Grossly neurovascularly intact throughout   Tenderness to Palpation:  anteromedial and anterolateral knee joint line  -Positive Gerry's test for pain  -Stable ligamentous exam      LLE:  Vascular: Limb well perfused, compartments soft/compressible. Skin: No erythema/ulcers. Intact. Neurovascular Status:  Grossly neurovascularly intact throughout   Tenderness to Palpation:  anteromedial and anterolateral knee joint line  -Positive Gerry's test for pain  -Stable ligamentous exam          RADIOLOGY:   2021 FINDINGS:  Four weightbearing views (AP, Tunnel, Lateral, and Sunrise) of the left knee were obtained in the office today and reviewed, revealing no acute fracture, dislocation, or radioopaque foreign body/tumor. Overall alignment is satisfactory.   Tricompartmental degenerative changes of the knee with joint narrowing, sclerosis, and osteophytes. IMPRESSION: No acute fracture/dislocation. Degenerative changes as above. Electronically signed by Krupa Pickering MD        FINDINGS:  Four weightbearing views (AP, Lateral, Tunnel and Sunrise) of the right knee were obtained in the office today and reviewed, revealing no acute fracture, dislocation, or radioopaque foreign body/tumor. Overall alignment is satisfactory. IMPRESSION: No acute fracture/dislocation. Electronically signed by Krupa Pickering MD        -MRI from 1/18/2021 reviewed, both images and report as below:  Abnormal signal and morphology of the lateral meniscus anterior horn   extending to the anterior root attachment suspicious for a tear.  No medial   meniscus tear identified.       Cruciate and collateral ligaments are intact.       No acute fracture or significant bone marrow edema.       Small knee joint effusion.       Tricompartmental osteoarthrosis, most evident at the patella where there are   areas of full-thickness cartilage loss.           ASSESSMENT AND PLAN:    Body mass index is 34.39 kg/m². She has bilateral knee pain, secondary to tricompartmental osteoarthritis, along with a right knee meniscal tear secondary to injury that occurred on 1/17/2021 (MRI showed a tear of the anterior horn of the lateral meniscus, as well as tricompartmental arthritis with full-thickness cartilage loss underneath the patella; a right knee steroid injection on 1/22/2021 helped her pain approximately 75%). Notably, she has the past medical history as above. She has a history of COPD, major depressive disorder, and GERD. I had another discussion today with the patient about the likely diagnosis and its natural history, physical exam and imaging findings, as well as treatment options in detail. Surgically, we discussed the possible arthroscopic meniscal debridement, depending on her clinical course.   At this time, she does wish to avoid surgery if possible, but may consider surgery in the future depending on her symptoms. Orders/referrals were placed as below at today's visit. She may continue to use Voltaren gel as needed, but she does report that this does not significantly help her pain. I encouraged her to continue doing home exercises for physical therapy. She may use her over-the-counter knee sleeve as needed to help with pain. Per the patient's request, she was also provided a referral to see an endocrinologist.    As her right knee pain did previously improve with an injection, she did wish to proceed with repeat bilateral knee injections today, as below. All questions were answered and the patient agrees with the above plan. The patient will return to clinic 3 months with possible right shoulder x-rays (if her shoulder is bothering her). At her next visit, we may evaluate her right shoulder and consider a possible injection (she reports that she has previously performed physical therapy for her right shoulder), and for her knees we may consider gel injections versus a possible right knee arthroscopic meniscal debridement. KNEE INJECTION PROCEDURE NOTE: After discussing the risks/benefits/alternatives to injection, an informed consent was obtained verbally. The bilateral knee was verified as the correct location and allergies were reviewed. The skin overlying the injection site was cleaned with an alcohol swab followed by a local sterile prep. A 22 gauge needle was introduced into the above location under sterile conditions. A mixture of 40 mg of Kenalog and 4 mL of 1% Lidocaine without epinephrine was injected into each knee. The patient was noted to tolerate the procedure well without immediate complication. A dressing was applied and verbal instruction/education was provided. Return in about 3 months (around 9/11/2021).     Orders Placed This Encounter   Medications    lidocaine 1 % injection 8 mL    triamcinolone acetonide (KENALOG-40) injection 40 mg    triamcinolone acetonide (KENALOG-40) injection 40 mg     Orders Placed This Encounter   Procedures    XR KNEE LEFT (MIN 4 VIEWS)     Standing Status:   Future     Number of Occurrences:   1     Standing Expiration Date:   6/11/2022     Order Specific Question:   Reason for exam:     Answer:   knee pain    External Referral To Endocrinology     Referral Priority:   Routine     Referral Type:   Eval and Treat     Referral Reason:   Specialty Services Required     Requested Specialty:   Endocrinology     Number of Visits Requested:   1         Nilo Vicente MD  Orthopedic Surgery        Please excuse any typos/errors, as this note was created with the assistance of voice recognition software. While intending to generate a document that actually reflects the content of the visit, the document can still have some errors including those of syntax and sound-a-like substitutions which may escape proof reading. In such instances, actual meaning can be extrapolated by context.

## 2021-09-20 ENCOUNTER — TELEPHONE (OUTPATIENT)
Dept: FAMILY MEDICINE CLINIC | Age: 76
End: 2021-09-20

## 2021-09-20 NOTE — TELEPHONE ENCOUNTER
Kumar Beltran was contacted as a part of [employer]. A voicemail message was left reminding the patient to schedule an AWV with their PCP.

## 2021-11-11 ENCOUNTER — OFFICE VISIT (OUTPATIENT)
Dept: ORTHOPEDIC SURGERY | Age: 76
End: 2021-11-11
Payer: MEDICARE

## 2021-11-11 VITALS — HEIGHT: 62 IN | BODY MASS INDEX: 34.6 KG/M2 | WEIGHT: 188 LBS | RESPIRATION RATE: 16 BRPM

## 2021-11-11 DIAGNOSIS — M17.0 PRIMARY OSTEOARTHRITIS OF BOTH KNEES: Primary | ICD-10-CM

## 2021-11-11 PROCEDURE — 3017F COLORECTAL CA SCREEN DOC REV: CPT | Performed by: ORTHOPAEDIC SURGERY

## 2021-11-11 PROCEDURE — 99212 OFFICE O/P EST SF 10 MIN: CPT | Performed by: ORTHOPAEDIC SURGERY

## 2021-11-11 PROCEDURE — 20610 DRAIN/INJ JOINT/BURSA W/O US: CPT | Performed by: ORTHOPAEDIC SURGERY

## 2021-11-11 PROCEDURE — G8427 DOCREV CUR MEDS BY ELIG CLIN: HCPCS | Performed by: ORTHOPAEDIC SURGERY

## 2021-11-11 PROCEDURE — 1123F ACP DISCUSS/DSCN MKR DOCD: CPT | Performed by: ORTHOPAEDIC SURGERY

## 2021-11-11 PROCEDURE — G8400 PT W/DXA NO RESULTS DOC: HCPCS | Performed by: ORTHOPAEDIC SURGERY

## 2021-11-11 PROCEDURE — 1036F TOBACCO NON-USER: CPT | Performed by: ORTHOPAEDIC SURGERY

## 2021-11-11 PROCEDURE — G8484 FLU IMMUNIZE NO ADMIN: HCPCS | Performed by: ORTHOPAEDIC SURGERY

## 2021-11-11 PROCEDURE — G8417 CALC BMI ABV UP PARAM F/U: HCPCS | Performed by: ORTHOPAEDIC SURGERY

## 2021-11-11 PROCEDURE — 4040F PNEUMOC VAC/ADMIN/RCVD: CPT | Performed by: ORTHOPAEDIC SURGERY

## 2021-11-11 PROCEDURE — 1090F PRES/ABSN URINE INCON ASSESS: CPT | Performed by: ORTHOPAEDIC SURGERY

## 2021-11-11 RX ORDER — LIDOCAINE HYDROCHLORIDE 10 MG/ML
4 INJECTION, SOLUTION INFILTRATION; PERINEURAL ONCE
Status: COMPLETED | OUTPATIENT
Start: 2021-11-11 | End: 2021-11-11

## 2021-11-11 RX ORDER — TRIAMCINOLONE ACETONIDE 40 MG/ML
40 INJECTION, SUSPENSION INTRA-ARTICULAR; INTRAMUSCULAR ONCE
Status: COMPLETED | OUTPATIENT
Start: 2021-11-11 | End: 2021-11-11

## 2021-11-11 RX ADMIN — TRIAMCINOLONE ACETONIDE 40 MG: 40 INJECTION, SUSPENSION INTRA-ARTICULAR; INTRAMUSCULAR at 16:45

## 2021-11-11 RX ADMIN — LIDOCAINE HYDROCHLORIDE 4 ML: 10 INJECTION, SOLUTION INFILTRATION; PERINEURAL at 16:45

## 2021-11-11 NOTE — PROGRESS NOTES
P[facility] Schmidt AND SPORTS MEDICINE  [facility] Jameson Haro  [facility] [address]  Dept: [phone]    Ambulatory Orthopedic Consult      CHIEF COMPLAINT:    Chief Complaint   Patient presents with    Knee Pain     Bilateral       HISTORY OF PRESENT ILLNESS:      The patient is a 78 y.o. female who is being seen for consultation and evaluation of pain at the anterior knee, which began secondary to an injury on 1/17/2021. The pain is described mainly with mechanical terms (dull/sharp/throbbing). The pain is worse with activity and better with rest. The patient reports a progressive course. The patient has tried:      [x]  rest/activity modification          []  NSAIDs      []  opiates      []  orthotics        []  change in shoes   [x]  home exercises  []  physical therapy      []  CAM boot     [x]  brace:    []  injection:       []  surgery:      I recently saw the patient in the hospital for this problem. INTERVAL HISTORY 3/26/2021:  She is seen again today in the office for follow up of a previous issue (as above). Since being seen last, the patient is doing better. At today's visit, she is not using a brace or assistive device. The location and quality of the pain have not significantly changed since the last visit. INTERVAL HISTORY 6/11/2021:  She is seen again today in the office for evaluation of a new issue as above, which began many years ago atraumatically (localizes the pain to her left knee medially greater than laterally)  . At today's visit, she is using no brace/assistive device. History is obtained today from:   [x]  the patient     []  EMR     []  one family member/friend    []  multiple family members/friends    []  other:      Regarding the previous issue, she reports the previous problem is doing worse. INTERVAL HISTORY 11/11/2021:  She is seen again today in the office for follow up of a previous issue (as above).  Since being seen grandmother; No Known Problems in her father. Social History:   Social History     Occupational History    Not on file   Tobacco Use    Smoking status: Former Smoker     Packs/day: 1.00     Years: 20.00     Pack years: 20.00     Quit date: 10/31/1994     Years since quittin.0    Smokeless tobacco: Never Used   Vaping Use    Vaping Use: Never used   Substance and Sexual Activity    Alcohol use: Yes     Comment: 2 DRINKS A WEEK    Drug use: No    Sexual activity: Not on file     Occupation: Retired     OBJECTIVE:  Resp 16   Ht 5' 2\" (1.575 m)   Wt 188 lb (85.3 kg)   LMP 10/31/1975 (Approximate)   BMI 34.39 kg/m²    Psych: alert and oriented to person, time, and place  Cardio:  well perfused extremities  Resp:  normal respiratory effort  Skin:  no cyanosis  Hem/lymph:  no lymphedema  Neuro:  sensation to light touch grossly intact throughout all nerve distributions in the foot   Musculoskeletal:    RLE:  Vascular: Limb well perfused, compartments soft/compressible. Skin: No erythema/ulcers. Intact. Neurovascular Status:  Grossly neurovascularly intact throughout   Tenderness to Palpation:  anteromedial and anterolateral knee joint line  -Positive Gerry's test for pain  -Stable ligamentous exam      LLE:  Vascular: Limb well perfused, compartments soft/compressible. Skin: No erythema/ulcers. Intact. Neurovascular Status:  Grossly neurovascularly intact throughout   Tenderness to Palpation:  anteromedial and anterolateral knee joint line  -Positive Gerry's test for pain  -Stable ligamentous exam          RADIOLOGY:   2021 No new radiology images today. Prior images reviewed for reference. FINDINGS:  Four weightbearing views (AP, Tunnel, Lateral, and Sunrise) of the left knee were obtained in the office today and reviewed, revealing no acute fracture, dislocation, or radioopaque foreign body/tumor. Overall alignment is satisfactory.   Tricompartmental degenerative changes of the knee with joint narrowing, sclerosis, and osteophytes. IMPRESSION: No acute fracture/dislocation. Degenerative changes as above. Electronically signed by Klaus Shi MD        FINDINGS:  Four weightbearing views (AP, Lateral, Tunnel and Sunrise) of the right knee were obtained in the office today and reviewed, revealing no acute fracture, dislocation, or radioopaque foreign body/tumor. Overall alignment is satisfactory. IMPRESSION: No acute fracture/dislocation. Electronically signed by Klaus Shi MD        -MRI from 1/18/2021 reviewed, both images and report as below:  Abnormal signal and morphology of the lateral meniscus anterior horn   extending to the anterior root attachment suspicious for a tear.  No medial   meniscus tear identified.       Cruciate and collateral ligaments are intact.       No acute fracture or significant bone marrow edema.       Small knee joint effusion.       Tricompartmental osteoarthrosis, most evident at the patella where there are   areas of full-thickness cartilage loss.           ASSESSMENT AND PLAN:    Body mass index is 34.39 kg/m². She has bilateral knee pain, secondary to tricompartmental osteoarthritis (bilateral knee corticosteroid injections on 6/11/2021 helped her pain approximately 75%), along with a right knee meniscal tear secondary to injury that occurred on 1/17/2021 (MRI showed a tear of the anterior horn of the lateral meniscus, as well as tricompartmental arthritis with full-thickness cartilage loss underneath the patella). Notably, she has the past medical history as above. She has a history of COPD, major depressive disorder, and GERD. We had a discussion about the likely diagnosis and its natural history, physical exam and imaging findings, as well as treatment options in detail. Surgically, we have discussed a possible right knee arthroscopic meniscal debridement, depending on her clinical course.  At today's visit, she does wish to continue with conservative management, and avoid surgery if possible. Orders/referrals were placed as below at today's visit. She may continue to use her over-the-counter knee sleeve, Voltaren gel as needed, and physical therapy/home exercises. After discussing her options, both surgical and nonsurgical, she wished to proceed with bilateral knee injections today as below. All questions were answered and the patient agrees with the above plan. The patient will return to clinic in 3 to 6 months as needed. At her next visit, we may consider repeat bilateral knee injections (versus possible gel injections in the future), as well as a possible right knee arthroscopic debridement as above. KNEE INJECTION PROCEDURE NOTE: After discussing the risks/benefits/alternatives to injection, an informed consent was obtained verbally. The bilateral knee was verified as the correct location and allergies were reviewed. The skin overlying the injection site was cleaned with an alcohol swab followed by a local sterile prep. A 22 gauge needle was introduced into the above location under sterile conditions. A mixture of 40 mg of Kenalog and 4 mL of 1% Lidocaine without epinephrine was injected into each knee. The patient was noted to tolerate the procedure well without immediate complication. A dressing was applied and verbal instruction/education was provided. No follow-ups on file. No orders of the defined types were placed in this encounter. No orders of the defined types were placed in this encounter. Skyler Paez MD  Orthopedic Surgery        Please excuse any typos/errors, as this note was created with the assistance of voice recognition software.  While intending to generate a document that actually reflects the content of the visit, the document can still have some errors including those of syntax and sound-a-like substitutions which may escape proof reading. In such instances, actual meaning can be extrapolated by context.

## 2021-12-12 NOTE — CARE COORDINATION
[facility] Transitions Initial Follow Up Call- COVID risk follow up call       Call within 2 business days of discharge: Yes    Patient: Arnulfo Ingram Patient : 1945   MRN: [id number]  Reason for Admission: acute right knee pain, inability to ambulate  Discharge Date: 21 RARS: No data recorded    Last Discharge [address] [address]       Complaint Diagnosis Description Type Department Provider    21 Knee Pain Acute pain of right knee . .. ED to Hosp-Admission (Discharged) (ADMITTED) Rosa Isela Santana MD; Jose Murcia,... Spoke with: Alvaro Lowery     Call to pt who states she is doing \"okay\". States pain in her right knee 6/10. States she took a pain pill last night but has not taken one this morning. States understanding she can take one every 4 hours as needed  Advised ice to knee 10-20 minutes at a time throughout the day  States she is walking with walker okay but has not tried the steps yet   Denies fever/ chills, cough      Challenges to be reviewed by the provider   Additional needs identified to be addressed with provider No  none    Discussed COVID-19 related testing which was not done at this time. Test results were not done. Patient informed of results, if available? Yes         Method of communication with provider : none    Advance Care Planning:   Does patient have an Advance Directive:  not on file; education provided. Was this a readmission? No  Patient stated reason for admission: knee   Patients top risk factors for readmission: medical condition    Care Transition Nurse (CTN) contacted the patient by telephone to perform post hospital discharge assessment. Verified name and  with patient as identifiers. Provided introduction to self, and explanation of the CTN role. CTN reviewed discharge instructions, medical action plan and red flags with patient who verbalized understanding.  Patient given an opportunity to ask questions and does not have any further questions or concerns at this time. Were discharge instructions available to patient? Yes. Reviewed appropriate site of care based on symptoms and resources available to patient including: Specialist, Home health and ctn. The patient agrees to contact the PCP office for questions related to their healthcare. Medication reconciliation was performed with patient, who verbalizes understanding of administration of home medications. Advised obtaining a 90-day supply of all daily and as-needed medications. Covid Risk Education    Patient has following risk factors of: COPD. Education provided regarding infection prevention, and signs and symptoms of COVID-19 and when to seek medical attention with patient who verbalized understanding. Discussed exposure protocols and quarantine From CDC: Are you at higher risk for severe illness?   and given an opportunity for questions and concerns. The patient agrees to contact the COVID-19 hotline [phone] or PCP office for questions related to COVID-19. For more information on steps you can take to protect yourself, see CDC's How to Protect Yourself     Patient/family/caregiver given information for [employer] [facility] and agrees to enroll no  Patient's preferred e-mail: declines  Patient's preferred phone number: declines    Discussed follow-up appointments. If no appointment was previously scheduled, appointment scheduling offered: No. Is follow up appointment scheduled within 7 days of discharge? No  Non-[facility] follow up appointment(s): n/a     Plan for follow-up call in 7-10 days based on severity of symptoms and risk factors. Plan for next call: symptom management-knee pain, ambulation  and follow up appointment-ortho follow up   CTN provided contact information for future needs.             Non-face-to-face services provided:  Scheduled appointment with Specialist-confirmed appt with Dr Leticia Hanna 2/2 but states she might try to get in with another ortho doctor she is familiar with   Obtained and reviewed discharge summary and/or continuity of care documents  Communication with home health agencies or other community services the patient is currently using-states she [facility]- writer call to Herrera with Clarisse Tirado who states pt is set for [facility] tomorrow- writer informs pt has not heard from them yet- states she will have pt called  Assessment and support for treatment adherence and medication management-confirms new meds     Care Transitions 24 Hour Call    Do you have any ongoing symptoms?: No  Do you have a copy of your discharge instructions?: Yes  Do you have all of your prescriptions and are they filled?: Yes  Have you been contacted by a [address]?: No  Have you scheduled your follow up appointment?: Yes  How are you going to get to your appointment?: Car - family or friend to transport  Were you discharged with any Home Care or Post Acute Services: Yes  Post Acute Services: [address] (Comment: [facility])  Do you feel like you have everything you need to keep you well at home?: Yes  Care Transitions Interventions         Follow Up  Future Appointments   Date Time Provider Dirk Jordan   2/2/2021 10:15 Trung Pearl, [facility]   6/17/2021 11:00 AM Lindsay Perez MD pa[email or web address]       Francisco Villatoro RN [facility]

## 2022-01-27 ENCOUNTER — OFFICE VISIT (OUTPATIENT)
Dept: ORTHOPEDIC SURGERY | Age: 77
End: 2022-01-27
Payer: MEDICARE

## 2022-01-27 VITALS — WEIGHT: 188 LBS | RESPIRATION RATE: 12 BRPM | HEIGHT: 62 IN | BODY MASS INDEX: 34.6 KG/M2

## 2022-01-27 DIAGNOSIS — S83.206D ACUTE MENISCAL TEAR OF RIGHT KNEE, SUBSEQUENT ENCOUNTER: ICD-10-CM

## 2022-01-27 DIAGNOSIS — M17.0 PRIMARY OSTEOARTHRITIS OF BOTH KNEES: Primary | ICD-10-CM

## 2022-01-27 PROCEDURE — 4040F PNEUMOC VAC/ADMIN/RCVD: CPT | Performed by: ORTHOPAEDIC SURGERY

## 2022-01-27 PROCEDURE — 3017F COLORECTAL CA SCREEN DOC REV: CPT | Performed by: ORTHOPAEDIC SURGERY

## 2022-01-27 PROCEDURE — 1123F ACP DISCUSS/DSCN MKR DOCD: CPT | Performed by: ORTHOPAEDIC SURGERY

## 2022-01-27 PROCEDURE — G8484 FLU IMMUNIZE NO ADMIN: HCPCS | Performed by: ORTHOPAEDIC SURGERY

## 2022-01-27 PROCEDURE — 1036F TOBACCO NON-USER: CPT | Performed by: ORTHOPAEDIC SURGERY

## 2022-01-27 PROCEDURE — 20610 DRAIN/INJ JOINT/BURSA W/O US: CPT | Performed by: ORTHOPAEDIC SURGERY

## 2022-01-27 PROCEDURE — 99212 OFFICE O/P EST SF 10 MIN: CPT | Performed by: ORTHOPAEDIC SURGERY

## 2022-01-27 PROCEDURE — G8427 DOCREV CUR MEDS BY ELIG CLIN: HCPCS | Performed by: ORTHOPAEDIC SURGERY

## 2022-01-27 PROCEDURE — G8417 CALC BMI ABV UP PARAM F/U: HCPCS | Performed by: ORTHOPAEDIC SURGERY

## 2022-01-27 PROCEDURE — G8400 PT W/DXA NO RESULTS DOC: HCPCS | Performed by: ORTHOPAEDIC SURGERY

## 2022-01-27 PROCEDURE — 1090F PRES/ABSN URINE INCON ASSESS: CPT | Performed by: ORTHOPAEDIC SURGERY

## 2022-01-27 RX ORDER — LIDOCAINE HYDROCHLORIDE 10 MG/ML
4 INJECTION, SOLUTION INFILTRATION; PERINEURAL ONCE
Status: COMPLETED | OUTPATIENT
Start: 2022-01-27 | End: 2022-01-27

## 2022-01-27 RX ORDER — TRIAMCINOLONE ACETONIDE 40 MG/ML
40 INJECTION, SUSPENSION INTRA-ARTICULAR; INTRAMUSCULAR ONCE
Status: COMPLETED | OUTPATIENT
Start: 2022-01-27 | End: 2022-01-27

## 2022-01-27 RX ADMIN — LIDOCAINE HYDROCHLORIDE 4 ML: 10 INJECTION, SOLUTION INFILTRATION; PERINEURAL at 10:14

## 2022-01-27 RX ADMIN — TRIAMCINOLONE ACETONIDE 40 MG: 40 INJECTION, SUSPENSION INTRA-ARTICULAR; INTRAMUSCULAR at 10:15

## 2022-01-27 NOTE — PROGRESS NOTES
[address] [place] [address] AND SPORTS MEDICINE  [facility]  [address] [place] [id number]  Dept: [phone]    Ambulatory Orthopedic Consult      CHIEF COMPLAINT:    Chief Complaint   Patient presents with    Knee Pain     bilateral        HISTORY OF PRESENT ILLNESS:      The patient is a 78 y.o. female who is being seen for consultation and evaluation of pain at the anterior knee, which began secondary to an injury on 1/17/2021. The pain is described mainly with mechanical terms (dull/sharp/throbbing). The pain is worse with activity and better with rest. The patient reports a progressive course. The patient has tried:      [x]  rest/activity modification          []  NSAIDs      []  opiates      []  orthotics        []  change in shoes   [x]  home exercises  []  physical therapy      []  CAM boot     [x]  brace:    []  injection:       []  surgery:      I recently saw the patient in the hospital for this problem. INTERVAL HISTORY 3/26/2021:  She is seen again today in the office for follow up of a previous issue (as above). Since being seen last, the patient is doing better. At today's visit, she is not using a brace or assistive device. The location and quality of the pain have not significantly changed since the last visit. INTERVAL HISTORY 6/11/2021:  She is seen again today in the office for evaluation of a new issue as above, which began many years ago atraumatically (localizes the pain to her left knee medially greater than laterally)  . At today's visit, she is using no brace/assistive device. History is obtained today from:   [x]  the patient     []  EMR     []  one family member/friend    []  multiple family members/friends    []  other:      Regarding the previous issue, she reports the previous problem is doing worse.      INTERVAL HISTORY 11/11/2021:  She is seen again today in the office for follow up of a previous issue (as above). Since being seen last, the patient is doing better. At today's visit, she is not using a brace or assistive device. History is obtained today from:   [x]  the patient     []  EMR     []  one family member/friend    []  multiple family members/friends    []  other:      INTERVAL HISTORY 2022:  She is seen again today in the office for follow up of a previous issue (as above). Since being seen last, the patient is doing worse. At today's visit, she is not using a brace or assistive device. History is obtained today from:   [x]  the patient     []  EMR     []  one family member/friend    []  multiple family members/friends    []  other:          REVIEW OF SYSTEMS:  Musculoskeletal: See HPI for pertinent positives     Past Medical History:    She  has a past medical history of Age-related osteoporosis without current pathological fracture, Arthritis (), Fibromyalgia (), GERD (gastroesophageal reflux disease) (DX PRIOR TO ), GERD (gastroesophageal reflux disease), Hyperlipidemia, Hypertension, Kidney stone ( & ), Pneumonia (), Recurrent UTI, Sleep apnea (), Vision abnormalities (), and Wears glasses. Past Surgical History:    She  has a past surgical history that includes Hysterectomy (); Appendectomy (); Tonsillectomy ();  section, classic (1964, 1972, & 1974); vitrectomy (Right, 2014); Colonoscopy; Colonoscopy (N/A, 10/31/2019); and Upper gastrointestinal endoscopy (N/A, 2020).      Current Medications:     Current Outpatient Medications:     omeprazole (PRILOSEC) 20 MG delayed release capsule, Take 1 capsule by mouth Daily 30 minutes before dinner, Disp: 90 capsule, Rfl: 3    diclofenac sodium (VOLTAREN) 1 % GEL, Apply 2 g topically 2 times daily, Disp: 350 g, Rfl: 0    diclofenac sodium (VOLTAREN) 1 % GEL, Apply 2 g topically 2 times daily (Patient not taking: Reported on 3/26/2021), Disp: 350 g, Rfl: 0    clonazePAM (KLONOPIN) 0.5 MG tablet, Take 1 tablet by mouth 2 times daily for 30 days. , Disp: 60 tablet, Rfl: 0    lisinopril (PRINIVIL;ZESTRIL) 10 MG tablet, Take 1 tablet by mouth daily, Disp: 30 tablet, Rfl: 0     Allergies:    Cefdinir, Morphine, Avelox [moxifloxacin], Ciprofloxacin, Quinolones, Statins, and Sulfa antibiotics    Family History:  family history includes Arthritis in her mother; Cancer in her maternal grandmother; No Known Problems in her father. Social History:   Social History     Occupational History    Not on file   Tobacco Use    Smoking status: Former Smoker     Packs/day: 1.00     Years: 20.00     Pack years: 20.00     Quit date: 10/31/1994     Years since quittin.2    Smokeless tobacco: Never Used   Vaping Use    Vaping Use: Never used   Substance and Sexual Activity    Alcohol use: Yes     Comment: 2 DRINKS A WEEK    Drug use: No    Sexual activity: Not on file     Occupation: Retired     OBJECTIVE:  Resp 12   Ht 5' 2\" (1.575 m)   Wt 188 lb (85.3 kg)   LMP 10/31/1975 (Approximate)   BMI 34.39 kg/m²    Psych: alert and oriented to person, time, and place  Cardio:  well perfused extremities  Resp:  normal respiratory effort  Skin:  no cyanosis  Hem/lymph:  no lymphedema  Neuro:  sensation to light touch grossly intact throughout all nerve distributions in the foot   Musculoskeletal:    RLE:  Vascular: Limb well perfused, compartments soft/compressible. Skin: No erythema/ulcers. Intact. Neurovascular Status:  Grossly neurovascularly intact throughout   Tenderness to Palpation:  anteromedial and anterolateral knee joint line  -Positive Gerry's test for pain  -Stable ligamentous exam      LLE:  Vascular: Limb well perfused, compartments soft/compressible. Skin: No erythema/ulcers. Intact.    Neurovascular Status:  Grossly neurovascularly intact throughout   Tenderness to Palpation:  anteromedial and anterolateral knee joint line  -Positive Gerry's test for pain  -Stable ligamentous exam          RADIOLOGY:   1/27/2022 No new radiology images today. Prior images reviewed for reference. FINDINGS:  Four weightbearing views (AP, Tunnel, Lateral, and Sunrise) of the left knee were obtained in the office today and reviewed, revealing no acute fracture, dislocation, or radioopaque foreign body/tumor. Overall alignment is satisfactory. Tricompartmental degenerative changes of the knee with joint narrowing, sclerosis, and osteophytes. IMPRESSION: No acute fracture/dislocation. Degenerative changes as above. Electronically signed by Tova Huffman MD        FINDINGS:  Four weightbearing views (AP, Lateral, Tunnel and Sunrise) of the right knee were obtained in the office today and reviewed, revealing no acute fracture, dislocation, or radioopaque foreign body/tumor. Overall alignment is satisfactory. IMPRESSION: No acute fracture/dislocation. Electronically signed by Tova Huffman MD        -MRI from 1/18/2021 reviewed, both images and report as below:  Abnormal signal and morphology of the lateral meniscus anterior horn   extending to the anterior root attachment suspicious for a tear.  No medial   meniscus tear identified.       Cruciate and collateral ligaments are intact.       No acute fracture or significant bone marrow edema.       Small knee joint effusion.       Tricompartmental osteoarthrosis, most evident at the patella where there are   areas of full-thickness cartilage loss.           ASSESSMENT AND PLAN:  Body mass index is 34.39 kg/m².        She has bilateral knee pain, secondary to tricompartmental osteoarthritis (bilateral knee corticosteroid injections on 6/11/2021 helped her pain approximately 75%; bilateral knee injections on 11/11/2021 helped her pain approximately 75%), along with a right knee meniscal tear secondary to injury that occurred on 1/17/2021 (MRI showed a tear of the anterior horn of the lateral meniscus, as well as tricompartmental arthritis with full-thickness cartilage loss underneath the patella). At today's visit, she reports that her right knee pain is worse than her left. Notably, she has the past medical history as above. She has a history of COPD, major depressive disorder, and GERD. We had a discussion about the likely diagnosis and its natural history, physical exam and imaging findings, as well as treatment options in detail. Surgically, we again discussed a possible right knee arthroscopic meniscal debridement. We discussed the expected postoperative course, including the relevant weightbearing restrictions/immobilization. We discussed the risks of incomplete pain relief. At today's visit, after a detailed conversation regarding the perioperative course, she does state that she wishes to continue with conservative management at this time, but she is considering surgery for the future. Orders/referrals were placed as below at today's visit. She may continue to use her over-the-counter knee sleeve, Voltaren gel as needed, and physical therapy/home exercises. After discussing her options, both surgical and nonsurgical, she wished to proceed with bilateral knee injections today as below. All questions were answered and the patient agrees with the above plan. The patient will return to clinic in 3 months without x-rays. At her next visit, we may consider repeat bilateral knee injections (versus possible gel injections in the future), as well as a possible right knee arthroscopic debridement as above. KNEE INJECTION PROCEDURE NOTE: After discussing the risks/benefits/alternatives to injection, an informed consent was obtained verbally. The bilateral knee was verified as the correct location and allergies were reviewed. The skin overlying the injection site was cleaned with an alcohol swab followed by a local sterile prep.  A 22 gauge needle was introduced into the above location under sterile conditions. A mixture of 40 mg of Kenalog and 4 mL of 1% Lidocaine without epinephrine was injected into each knee. The patient was noted to tolerate the procedure well without immediate complication. A dressing was applied and verbal instruction/education was provided. No follow-ups on file. No orders of the defined types were placed in this encounter. No orders of the defined types were placed in this encounter. Nila Lay MD  Orthopedic Surgery        Please excuse any typos/errors, as this note was created with the assistance of voice recognition software. While intending to generate a document that actually reflects the content of the visit, the document can still have some errors including those of syntax and sound-a-like substitutions which may escape proof reading. In such instances, actual meaning can be extrapolated by context.

## 2022-03-18 ENCOUNTER — TELEPHONE (OUTPATIENT)
Dept: ORTHOPEDIC SURGERY | Age: 77
End: 2022-03-18

## 2022-03-18 NOTE — TELEPHONE ENCOUNTER
Patient was last seen in office 01/27/2022 for bilat knee pain and injections. She is already having severe knee pains, she scheduled for follow up injections 04/28  - following the 90 day rule. However, she is asking if there is anything she can do in the meantime as she is in a lot of pain. I offered her a sooner appt to discuss but it is very difficult for her to get time off of work.     Please call her with advice  Thank you

## 2022-03-18 NOTE — TELEPHONE ENCOUNTER
Spoke with patient and she stated that she has been trying biofreeze, the voltaren gel that was prescribed to her, alternating tylenol/ibuprofen, and using heat (which she states made her pain worse). She says she tries to elevate and rest when she can when she isn't working. I told her it sounds like she has been trying everything we would recommend to her. I told her that she could alternate ice with heat, 15 min on/15 min off but always end with ice to see if that may help. Otherwise, it seems like she would need to come back in and speak with Dr. Antione Bishop if her pain is no better. She said she would give that a try and continue the other therapies. Said she will call and schedule if the pain becomes too much. She had no further questions at this time.

## 2022-05-19 ENCOUNTER — HOSPITAL ENCOUNTER (EMERGENCY)
Age: 77
Discharge: HOME | End: 2022-05-19
Attending: EMERGENCY MEDICINE
Payer: MEDICARE

## 2022-05-19 ENCOUNTER — APPOINTMENT (OUTPATIENT)
Dept: CT IMAGING | Age: 77
End: 2022-05-19
Payer: MEDICARE

## 2022-05-19 VITALS
TEMPERATURE: 97.5 F | HEART RATE: 61 BPM | OXYGEN SATURATION: 100 % | RESPIRATION RATE: 20 BRPM | WEIGHT: 180 LBS | BODY MASS INDEX: 32.92 KG/M2 | DIASTOLIC BLOOD PRESSURE: 69 MMHG | SYSTOLIC BLOOD PRESSURE: 166 MMHG

## 2022-05-19 DIAGNOSIS — R11.2 NON-INTRACTABLE VOMITING WITH NAUSEA, UNSPECIFIED VOMITING TYPE: Primary | ICD-10-CM

## 2022-05-19 LAB
ABSOLUTE EOS #: <0.03 K/UL (ref 0–0.44)
ABSOLUTE IMMATURE GRANULOCYTE: 0.06 K/UL (ref 0–0.3)
ABSOLUTE LYMPH #: 1.55 K/UL (ref 1.1–3.7)
ABSOLUTE MONO #: 0.29 K/UL (ref 0.1–1.2)
ALBUMIN SERPL-MCNC: 4.2 G/DL (ref 3.5–5.2)
ALP BLD-CCNC: 79 U/L (ref 35–104)
ALT SERPL-CCNC: 11 U/L (ref 5–33)
ANION GAP SERPL CALCULATED.3IONS-SCNC: 10 MMOL/L (ref 9–17)
AST SERPL-CCNC: 14 U/L
BASOPHILS # BLD: 1 % (ref 0–2)
BASOPHILS ABSOLUTE: 0.06 K/UL (ref 0–0.2)
BILIRUB SERPL-MCNC: 0.37 MG/DL (ref 0.3–1.2)
BUN BLDV-MCNC: 19 MG/DL (ref 8–23)
BUN/CREAT BLD: 33 (ref 9–20)
CALCIUM SERPL-MCNC: 9.4 MG/DL (ref 8.6–10.4)
CHLORIDE BLD-SCNC: 105 MMOL/L (ref 98–107)
CO2: 23 MMOL/L (ref 20–31)
CREAT SERPL-MCNC: 0.58 MG/DL (ref 0.5–0.9)
EOSINOPHILS RELATIVE PERCENT: 0 % (ref 1–4)
GFR AFRICAN AMERICAN: >60 ML/MIN
GFR NON-AFRICAN AMERICAN: >60 ML/MIN
GFR SERPL CREATININE-BSD FRML MDRD: ABNORMAL ML/MIN/{1.73_M2}
GLUCOSE BLD-MCNC: 137 MG/DL (ref 70–99)
HCT VFR BLD CALC: 41.8 % (ref 36.3–47.1)
HEMOGLOBIN: 13.6 G/DL (ref 11.9–15.1)
IMMATURE GRANULOCYTES: 1 %
LIPASE: 41 U/L (ref 13–60)
LYMPHOCYTES # BLD: 14 % (ref 24–43)
MCH RBC QN AUTO: 29.2 PG (ref 25.2–33.5)
MCHC RBC AUTO-ENTMCNC: 32.5 G/DL (ref 28.4–34.8)
MCV RBC AUTO: 89.7 FL (ref 82.6–102.9)
MONOCYTES # BLD: 3 % (ref 3–12)
NRBC AUTOMATED: 0 PER 100 WBC
PDW BLD-RTO: 14.3 % (ref 11.8–14.4)
PLATELET # BLD: 379 K/UL (ref 138–453)
PMV BLD AUTO: 9.9 FL (ref 8.1–13.5)
POTASSIUM SERPL-SCNC: 3.9 MMOL/L (ref 3.7–5.3)
RBC # BLD: 4.66 M/UL (ref 3.95–5.11)
SEG NEUTROPHILS: 81 % (ref 36–65)
SEGMENTED NEUTROPHILS ABSOLUTE COUNT: 9.24 K/UL (ref 1.5–8.1)
SODIUM BLD-SCNC: 138 MMOL/L (ref 135–144)
TOTAL PROTEIN: 7.1 G/DL (ref 6.4–8.3)
WBC # BLD: 11.2 K/UL (ref 3.5–11.3)

## 2022-05-19 PROCEDURE — 6360000004 HC RX CONTRAST MEDICATION: Performed by: PHYSICIAN ASSISTANT

## 2022-05-19 PROCEDURE — 96374 THER/PROPH/DIAG INJ IV PUSH: CPT

## 2022-05-19 PROCEDURE — 83690 ASSAY OF LIPASE: CPT

## 2022-05-19 PROCEDURE — A4216 STERILE WATER/SALINE, 10 ML: HCPCS | Performed by: PHYSICIAN ASSISTANT

## 2022-05-19 PROCEDURE — 2580000003 HC RX 258: Performed by: PHYSICIAN ASSISTANT

## 2022-05-19 PROCEDURE — 99285 EMERGENCY DEPT VISIT HI MDM: CPT

## 2022-05-19 PROCEDURE — 2500000003 HC RX 250 WO HCPCS: Performed by: PHYSICIAN ASSISTANT

## 2022-05-19 PROCEDURE — 6360000002 HC RX W HCPCS: Performed by: PHYSICIAN ASSISTANT

## 2022-05-19 PROCEDURE — 74177 CT ABD & PELVIS W/CONTRAST: CPT

## 2022-05-19 PROCEDURE — 96375 TX/PRO/DX INJ NEW DRUG ADDON: CPT

## 2022-05-19 PROCEDURE — 85025 COMPLETE CBC W/AUTO DIFF WBC: CPT

## 2022-05-19 PROCEDURE — 80053 COMPREHEN METABOLIC PANEL: CPT

## 2022-05-19 RX ORDER — 0.9 % SODIUM CHLORIDE 0.9 %
80 INTRAVENOUS SOLUTION INTRAVENOUS ONCE
Status: COMPLETED | OUTPATIENT
Start: 2022-05-19 | End: 2022-05-19

## 2022-05-19 RX ORDER — ONDANSETRON 2 MG/ML
4 INJECTION INTRAMUSCULAR; INTRAVENOUS ONCE
Status: COMPLETED | OUTPATIENT
Start: 2022-05-19 | End: 2022-05-19

## 2022-05-19 RX ORDER — ONDANSETRON 4 MG/1
4 TABLET, ORALLY DISINTEGRATING ORAL EVERY 8 HOURS PRN
Qty: 20 TABLET | Refills: 0 | Status: SHIPPED | OUTPATIENT
Start: 2022-05-19

## 2022-05-19 RX ORDER — 0.9 % SODIUM CHLORIDE 0.9 %
1000 INTRAVENOUS SOLUTION INTRAVENOUS ONCE
Status: COMPLETED | OUTPATIENT
Start: 2022-05-19 | End: 2022-05-19

## 2022-05-19 RX ORDER — SODIUM CHLORIDE 0.9 % (FLUSH) 0.9 %
10 SYRINGE (ML) INJECTION ONCE
Status: COMPLETED | OUTPATIENT
Start: 2022-05-19 | End: 2022-05-19

## 2022-05-19 RX ADMIN — SODIUM CHLORIDE 1000 ML: 9 INJECTION, SOLUTION INTRAVENOUS at 14:38

## 2022-05-19 RX ADMIN — IOPAMIDOL 75 ML: 755 INJECTION, SOLUTION INTRAVENOUS at 14:59

## 2022-05-19 RX ADMIN — SODIUM CHLORIDE 80 ML: 9 INJECTION, SOLUTION INTRAVENOUS at 14:59

## 2022-05-19 RX ADMIN — FAMOTIDINE 20 MG: 10 INJECTION, SOLUTION INTRAVENOUS at 14:36

## 2022-05-19 RX ADMIN — SODIUM CHLORIDE, PRESERVATIVE FREE 10 ML: 5 INJECTION INTRAVENOUS at 14:59

## 2022-05-19 RX ADMIN — ONDANSETRON 4 MG: 2 INJECTION INTRAMUSCULAR; INTRAVENOUS at 14:36

## 2022-05-19 ASSESSMENT — PAIN - FUNCTIONAL ASSESSMENT: PAIN_FUNCTIONAL_ASSESSMENT: 0-10

## 2022-05-19 NOTE — ED NOTES
Pt presents to ED from home with c/o N/V , onset this morning. Pt states she has not been able to keep anything down, has not ate since yesterday. Pt is A/O x4. Vitals are stable.       Delmi Kay RN  05/19/22 3035

## 2022-05-19 NOTE — ED PROVIDER NOTES
[address] [place] [address] ED  [facility]      Pt Name: Jag Gross  MRN: [id number]  [place] 1945  Date ofevaluation: 5/19/2022  Provider: Johanne Cabezas Dr       Chief Complaint   Patient presents with    Nausea    Emesis         HISTORY OF PRESENT ILLNESS  (Location/Symptom, Timing/Onset, Context/Setting, Quality, Duration, Modifying Factors, Severity.)   Jag Gross is a 78 y.o. female who presents to the emergency department with nausea and vomiting started this morning. Reports abdominal discomfort described as mild, sore. No diarrhea. No definite alleviating aggravating factors. No other complaints. No fevers or chills. No bloody or black stools. Nursing Notes were reviewed. ALLERGIES     Cefdinir, Morphine, Avelox [moxifloxacin], Ciprofloxacin, Quinolones, Statins, and Sulfa antibiotics    CURRENT MEDICATIONS       Discharge Medication List as of 5/19/2022  4:06 PM      CONTINUE these medications which have NOT CHANGED    Details   omeprazole (PRILOSEC) 20 MG delayed release capsule Take 1 capsule by mouth Daily 30 minutes before dinner, Disp-90 capsule, R-3Changing RX from 40  MG to 20 MGNormal      !! diclofenac sodium (VOLTAREN) 1 % GEL Apply 2 g topically 2 times daily, Topical, 2 TIMES DAILY Starting Fri 3/26/2021, Disp-350 g, R-0, Normal      !! diclofenac sodium (VOLTAREN) 1 % GEL Apply 2 g topically 2 times daily, Topical, 2 TIMES DAILY Starting Fri 1/22/2021, Disp-350 g, R-0, Normal      clonazePAM (KLONOPIN) 0.5 MG tablet Take 1 tablet by mouth 2 times daily for 30 days. , Disp-60 tablet, R-0Normal      lisinopril (PRINIVIL;ZESTRIL) 10 MG tablet Take 1 tablet by mouth daily, Disp-30 tablet,R-0Normal       !! - Potential duplicate medications found. Please discuss with provider.           PAST MEDICAL HISTORY         Diagnosis Date    Age-related osteoporosis without current pathological fracture     Arthritis 2012 GENERALIZED    Fibromyalgia 2012    GERD (gastroesophageal reflux disease) DX PRIOR TO 2004    ON RX     GERD (gastroesophageal reflux disease)     Hyperlipidemia     NO RX ALLERGIC TO STATINS    Hypertension     Kidney stone 2012 & 2013    X 2-SEVERAL STONES EACH TIME, PASSED ON OWN    Pneumonia     Recurrent UTI     Sleep apnea     HAS MACHINE BUT DOES NOT USE    Vision abnormalities     RT DISTORTED, LT DECREASED    Wears glasses        SURGICAL HISTORY           Procedure Laterality Date    APPENDECTOMY  [address] SECTION, CLASSIC  1964, 1972, & 7344    COLONOSCOPY      COLONOSCOPY N/A 10/31/2019    COLONOSCOPY WITH BIOPSY performed by Rudy Lake MD at [address] ENDOSCOPY N/A 2020    EGD ESOPHAGOGASTRODUODENOSCOPY performed by Rudy Lake MD at [address] VITRECTOMY Right 2014         FAMILY HISTORY           Problem Relation Age of Onset    Arthritis Mother     No Known Problems Father     Cancer Maternal Grandmother         UNKNOWN     Family Status   Relation Name Status    Mother Zuleyka Otoole     Father Johnnie Portillo     Sister Cruz Fragoso     MGM      MGF      PGM      PGF          SOCIAL HISTORY      reports that she quit smoking about 27 years ago. She has a 20.00 pack-year smoking history. She has never used smokeless tobacco. She reports current alcohol use. She reports that she does not use drugs. REVIEW OFSYSTEMS    (2-9 systems for level 4, 10 or more for level 5)   Review of Systems    Except as noted above the remainder of the review of systems was reviewed and negative.      PHYSICAL EXAM    (up to 7 for level 4, 8 or more for level 5)     ED Triage Vitals   BP Temp Temp Source Pulse Resp SpO2 Height Weight   22 1352 22 1419 22 1419 22 1352 22 1352 22 1352 -- 22 1352   (!) 166/69 97.5 °F (36.4 °C) Oral 61 20 100 %  180 lb (81.6 kg)      Physical Exam  Constitutional:       Appearance: She is well-developed. HENT:      Head: Normocephalic and atraumatic. Cardiovascular:      Rate and Rhythm: Normal rate and regular rhythm. Pulmonary:      Effort: Pulmonary effort is normal.      Breath sounds: Normal breath sounds. Abdominal:      General: There is no distension. Palpations: Abdomen is soft. Tenderness: There is no abdominal tenderness. Musculoskeletal:         General: Normal range of motion. Cervical back: Normal range of motion and neck supple. Skin:     General: Skin is warm. Findings: No rash. Neurological:      Mental Status: She is alert and oriented to person, place, and time. Psychiatric:         Behavior: Behavior normal.                 DIAGNOSTIC RESULTS     EKG: All EKG's are interpreted by the Emergency Department Physician who either signs or Co-signs this chart in the absence of a cardiologist.        RADIOLOGY:   Non-plain film images such as CT, Ultrasound and MRI are read by the radiologist. Plain radiographic images arevisualized and preliminarily interpreted by the emergency physician with the below findings:        Interpretation per the Radiologist below, if available at thetime of this note:          ED BEDSIDE ULTRASOUND:   Performed by ED Physician - none    LABS:  Labs Reviewed   CBC WITH AUTO DIFFERENTIAL - Abnormal; Notable for the following components:       Result Value    Seg Neutrophils 81 (*)     Lymphocytes 14 (*)     Eosinophils % 0 (*)     Immature Granulocytes 1 (*)     Segs Absolute 9.24 (*)     All other components within normal limits   COMPREHENSIVE METABOLIC PANEL - Abnormal; Notable for the following components:    Glucose 137 (*)     Bun/Cre Ratio 33 (*)     All other components within normal limits   LIPASE       All other labs were within normal range or not returned as of this dictation.     EMERGENCY DEPARTMENT COURSE and DIFFERENTIAL DIAGNOSIS/MDM:   Vitals:    Vitals:    05/19/22 1352 05/19/22 1419   BP: (!) 166/69    Pulse: 61    Resp: 20    Temp:  97.5 °F (36.4 °C)   TempSrc:  Oral   SpO2: 100%    Weight: 180 lb (81.6 kg)      Patient has a 30% better. Patient tolerating ice chips by mouth. Requesting discharge. Patient will be admitted symptomatically at home. Work-up negative here in the ER for any emergent acute process. CONSULTS:  None    PROCEDURES:  Procedures        FINAL IMPRESSION      1.  Non-intractable vomiting with nausea, unspecified vomiting type          DISPOSITION/PLAN   DISPOSITION Decision To Discharge 05/19/2022 04:02:50 PM      PATIENTREFERRED TO:   Iglesia Bach MD  [id number]  [address] [facility]  [phone]    In 3 days        DISCHARGE MEDICATIONS:     Discharge Medication List as of 5/19/2022  4:06 PM      START taking these medications    Details   ondansetron (ZOFRAN ODT) 4 MG disintegrating tablet Take 1 tablet by mouth every 8 hours as needed for Nausea, Disp-20 tablet, R-0Print                 (Please note that portions of this note were completed with a voice recognition program.  Efforts were made to edit thedictations but occasionally words are mis-transcribed.)    VEL Baez PA-C  05/19/22 1941

## 2022-05-19 NOTE — ED PROVIDER NOTES
EMERGENCY DEPARTMENT ENCOUNTER   ATTENDING ATTESTATION     Pt Name: Tang Paz  MRN: [id number]  [place] 1945  Date of evaluation: 5/19/22   Tang Paz is a 78 y.o. female with CC: Nausea and Emesis    MDM:   The patient is a 79-year-old female who presented to the emergency department secondary to nausea and vomiting. No acute findings on laboratory analysis, patient able to tolerate oral.  Discharged home with outpatient follow-up and given parameters to return to the emergency department  This visit was performed by both a physician and an APC. I personally evaluated and examined the patient. I performed all aspects of the MDM as documented. CRITICAL CARE:       EKG: All EKG's are interpreted by the Emergency Department Physician who either signs or Co-signs this chart in the absence of a cardiologist.      RADIOLOGY:All plain film, CT, MRI, and formal ultrasound images (except ED bedside ultrasound) are read by the radiologist, see reports below, unless otherwise noted in MDM or here. CT ABDOMEN PELVIS W IV CONTRAST Additional Contrast? None    (Results Pending)     LABS: All lab results were reviewed by myself, and all abnormals are listed below. Labs Reviewed   CBC WITH AUTO DIFFERENTIAL   COMPREHENSIVE METABOLIC PANEL   LIPASE     CONSULTS:  None  FINAL IMPRESSION    No diagnosis found.         PASTMEDICAL HISTORY     Past Medical History:   Diagnosis Date    Age-related osteoporosis without current pathological fracture     Arthritis 2012    GENERALIZED    Fibromyalgia 2012    GERD (gastroesophageal reflux disease) DX PRIOR TO 2004    ON RX     GERD (gastroesophageal reflux disease)     Hyperlipidemia     NO RX ALLERGIC TO STATINS    Hypertension     Kidney stone 2012 & 2013    X 2-SEVERAL STONES EACH TIME, PASSED ON OWN    Pneumonia 2012    Recurrent UTI     Sleep apnea 2012    HAS MACHINE BUT DOES NOT USE    Vision abnormalities 2014    RT DISTORTED, LT DECREASED    Wears glasses      SURGICAL HISTORY       Past Surgical History:   Procedure Laterality Date    APPENDECTOMY  [address] SECTION, CLASSIC  1964, 0, & 6642    COLONOSCOPY      COLONOSCOPY N/A 10/31/2019    COLONOSCOPY WITH BIOPSY performed by Benjamin Sparks MD at [address] ENDOSCOPY N/A 2020    EGD ESOPHAGOGASTRODUODENOSCOPY performed by Benjamin Sparks MD at [address] Right 2014     CURRENT MEDICATIONS       Previous Medications    CLONAZEPAM (KLONOPIN) 0.5 MG TABLET    Take 1 tablet by mouth 2 times daily for 30 days. DICLOFENAC SODIUM (VOLTAREN) 1 % GEL    Apply 2 g topically 2 times daily    DICLOFENAC SODIUM (VOLTAREN) 1 % GEL    Apply 2 g topically 2 times daily    LISINOPRIL (PRINIVIL;ZESTRIL) 10 MG TABLET    Take 1 tablet by mouth daily    OMEPRAZOLE (PRILOSEC) 20 MG DELAYED RELEASE CAPSULE    Take 1 capsule by mouth Daily 30 minutes before dinner     ALLERGIES     is allergic to cefdinir, morphine, avelox [moxifloxacin], ciprofloxacin, quinolones, statins, and sulfa antibiotics. FAMILY HISTORY     She indicated that her mother is . She indicated that her father is . She indicated that her sister is . She indicated that her maternal grandmother is . She indicated that her maternal grandfather is . She indicated that her paternal grandmother is . She indicated that her paternal grandfather is .      SOCIAL HISTORY       Social History     Tobacco Use    Smoking status: Former Smoker     Packs/day: 1.00     Years: 20.00     Pack years: 20.00     Quit date: 10/31/1994     Years since quittin.5    Smokeless tobacco: Never Used   Vaping Use    Vaping Use: Never used   Substance Use Topics    Alcohol use: Yes     Comment: 2 DRINKS A WEEK    Drug use: No          Valery Brumfield MD  The care is provided during an unprecedented national emergency due to the novel coronavirus, COVID 19.   Attending Emergency Physician          Andrzej Sun MD  94/92/65 Guzman Farmer MD  16/64/41 0974

## 2022-05-26 ENCOUNTER — OFFICE VISIT (OUTPATIENT)
Dept: ORTHOPEDIC SURGERY | Age: 77
End: 2022-05-26
Payer: MEDICARE

## 2022-05-26 VITALS — BODY MASS INDEX: 33.13 KG/M2 | WEIGHT: 180 LBS | HEIGHT: 62 IN | RESPIRATION RATE: 16 BRPM

## 2022-05-26 DIAGNOSIS — M17.0 PRIMARY OSTEOARTHRITIS OF BOTH KNEES: Primary | ICD-10-CM

## 2022-05-26 PROCEDURE — G8427 DOCREV CUR MEDS BY ELIG CLIN: HCPCS | Performed by: ORTHOPAEDIC SURGERY

## 2022-05-26 PROCEDURE — 1090F PRES/ABSN URINE INCON ASSESS: CPT | Performed by: ORTHOPAEDIC SURGERY

## 2022-05-26 PROCEDURE — 1036F TOBACCO NON-USER: CPT | Performed by: ORTHOPAEDIC SURGERY

## 2022-05-26 PROCEDURE — 3017F COLORECTAL CA SCREEN DOC REV: CPT | Performed by: ORTHOPAEDIC SURGERY

## 2022-05-26 PROCEDURE — 1123F ACP DISCUSS/DSCN MKR DOCD: CPT | Performed by: ORTHOPAEDIC SURGERY

## 2022-05-26 PROCEDURE — G8417 CALC BMI ABV UP PARAM F/U: HCPCS | Performed by: ORTHOPAEDIC SURGERY

## 2022-05-26 PROCEDURE — 99212 OFFICE O/P EST SF 10 MIN: CPT | Performed by: ORTHOPAEDIC SURGERY

## 2022-05-26 PROCEDURE — 20610 DRAIN/INJ JOINT/BURSA W/O US: CPT | Performed by: ORTHOPAEDIC SURGERY

## 2022-05-26 PROCEDURE — G8400 PT W/DXA NO RESULTS DOC: HCPCS | Performed by: ORTHOPAEDIC SURGERY

## 2022-05-26 RX ORDER — LIDOCAINE HYDROCHLORIDE 10 MG/ML
4 INJECTION, SOLUTION INFILTRATION; PERINEURAL ONCE
Status: SHIPPED | OUTPATIENT
Start: 2022-05-26

## 2022-05-26 RX ORDER — TRIAMCINOLONE ACETONIDE 40 MG/ML
40 INJECTION, SUSPENSION INTRA-ARTICULAR; INTRAMUSCULAR ONCE
Status: SHIPPED | OUTPATIENT
Start: 2022-05-26

## 2022-05-26 NOTE — PROGRESS NOTES
[address] [place] [address] AND SPORTS MEDICINE  [facility] Sandy Hernandez  [address] [address]  Dept: [phone]    Ambulatory Orthopedic Consult      CHIEF COMPLAINT:    Chief Complaint   Patient presents with    Knee Pain     Bilateral       HISTORY OF PRESENT ILLNESS:      The patient is a 78 y.o. female who is being seen for consultation and evaluation of pain at the anterior knee, which began secondary to an injury on 1/17/2021. The pain is described mainly with mechanical terms (dull/sharp/throbbing). The pain is worse with activity and better with rest. The patient reports a progressive course. The patient has tried:      [x]  rest/activity modification          []  NSAIDs      []  opiates      []  orthotics        []  change in shoes   [x]  home exercises  []  physical therapy      []  CAM boot     [x]  brace:    []  injection:       []  surgery:      I recently saw the patient in the hospital for this problem. INTERVAL HISTORY 3/26/2021:  She is seen again today in the office for follow up of a previous issue (as above). Since being seen last, the patient is doing better. At today's visit, she is not using a brace or assistive device. The location and quality of the pain have not significantly changed since the last visit. INTERVAL HISTORY 6/11/2021:  She is seen again today in the office for evaluation of a new issue as above, which began many years ago atraumatically (localizes the pain to her left knee medially greater than laterally)  . At today's visit, she is using no brace/assistive device. History is obtained today from:   [x]  the patient     []  EMR     []  one family member/friend    []  multiple family members/friends    []  other:      Regarding the previous issue, she reports the previous problem is doing worse.      INTERVAL HISTORY 11/11/2021:  She is seen again today in the office for follow up of a previous Current Medications:     Current Outpatient Medications:     ondansetron (ZOFRAN ODT) 4 MG disintegrating tablet, Take 1 tablet by mouth every 8 hours as needed for Nausea, Disp: 20 tablet, Rfl: 0    omeprazole (PRILOSEC) 20 MG delayed release capsule, Take 1 capsule by mouth Daily 30 minutes before dinner, Disp: 90 capsule, Rfl: 3    diclofenac sodium (VOLTAREN) 1 % GEL, Apply 2 g topically 2 times daily, Disp: 350 g, Rfl: 0    diclofenac sodium (VOLTAREN) 1 % GEL, Apply 2 g topically 2 times daily (Patient not taking: Reported on 3/26/2021), Disp: 350 g, Rfl: 0    clonazePAM (KLONOPIN) 0.5 MG tablet, Take 1 tablet by mouth 2 times daily for 30 days. , Disp: 60 tablet, Rfl: 0    lisinopril (PRINIVIL;ZESTRIL) 10 MG tablet, Take 1 tablet by mouth daily, Disp: 30 tablet, Rfl: 0     Allergies:    Cefdinir, Morphine, Avelox [moxifloxacin], Ciprofloxacin, Quinolones, Statins, and Sulfa antibiotics    Family History:  family history includes Arthritis in her mother; Cancer in her maternal grandmother; No Known Problems in her father.     Social History:   Social History     Occupational History    Not on file   Tobacco Use    Smoking status: Former Smoker     Packs/day: 1.00     Years: 20.00     Pack years: 20.00     Quit date: 10/31/1994     Years since quittin.5    Smokeless tobacco: Never Used   Vaping Use    Vaping Use: Never used   Substance and Sexual Activity    Alcohol use: Yes     Comment: 2 DRINKS A WEEK    Drug use: No    Sexual activity: Not on file     Occupation: Retired     OBJECTIVE:  Resp 16   Ht 5' 2\" (1.575 m)   Wt 180 lb (81.6 kg)   LMP 10/31/1975 (Approximate)   BMI 32.92 kg/m²    Psych: alert and oriented to person, time, and place  Cardio:  well perfused extremities  Resp:  normal respiratory effort  Skin:  no cyanosis  Hem/lymph:  no lymphedema  Neuro:  sensation to light touch grossly intact throughout all nerve distributions in the foot   Musculoskeletal: RLE:  Vascular: Limb well perfused, compartments soft/compressible. Skin: No erythema/ulcers. Intact. Neurovascular Status:  Grossly neurovascularly intact throughout   Tenderness to Palpation:  anteromedial and anterolateral knee joint line  -Positive Gerry's test for pain  -Stable ligamentous exam      LLE:  Vascular: Limb well perfused, compartments soft/compressible. Skin: No erythema/ulcers. Intact. Neurovascular Status:  Grossly neurovascularly intact throughout   Tenderness to Palpation:  anteromedial and anterolateral knee joint line  -Positive Gerry's test for pain  -Stable ligamentous exam          RADIOLOGY:   5/26/2022 No new radiology images today. Prior images reviewed for reference. FINDINGS:  Four weightbearing views (AP, Tunnel, Lateral, and Sunrise) of the left knee were obtained in the office today and reviewed, revealing no acute fracture, dislocation, or radioopaque foreign body/tumor. Overall alignment is satisfactory. Tricompartmental degenerative changes of the knee with joint narrowing, sclerosis, and osteophytes. IMPRESSION: No acute fracture/dislocation. Degenerative changes as above. Electronically signed by Juliet Mary MD        FINDINGS:  Four weightbearing views (AP, Lateral, Tunnel and Sunrise) of the right knee were obtained in the office today and reviewed, revealing no acute fracture, dislocation, or radioopaque foreign body/tumor. Overall alignment is satisfactory. IMPRESSION: No acute fracture/dislocation.      Electronically signed by Juliet Mary MD        -MRI from 1/18/2021 previously reviewed, both images and report as below:  Abnormal signal and morphology of the lateral meniscus anterior horn   extending to the anterior root attachment suspicious for a tear.  No medial   meniscus tear identified.       Cruciate and collateral ligaments are intact.       No acute fracture or significant bone marrow edema.       Small knee joint effusion.       Tricompartmental osteoarthrosis, most evident at the patella where there are   areas of full-thickness cartilage loss.           ASSESSMENT AND PLAN:  Body mass index is 32.92 kg/m². She has bilateral knee pain, secondary to tricompartmental osteoarthritis (bilateral knee corticosteroid injections on 6/11/2021 helped her pain approximately 75%; bilateral knee injections on 11/11/2021 helped her pain approximately 75%), along with a right knee meniscal tear secondary to injury that occurred on 1/17/2021 (MRI showed a tear of the anterior horn of the lateral meniscus, as well as tricompartmental arthritis with full-thickness cartilage loss underneath the patella). At today's visit, she reports that her right knee pain is equivalent to her left knee pain. Notably, she has the past medical history as above. She has a history of COPD, major depressive disorder, and GERD. We had a discussion about the likely diagnosis and its natural history, physical exam and imaging findings, as well as treatment options in detail. Surgically, we again discussed a possible right knee arthroscopic meniscal debridement, however we discussed the risks of incomplete pain relief, particularly given her tricompartmental osteoarthritis with full thickness cartilage loss in the patellofemoral component. At this time, she is not interested in surgery, and does wish to avoid surgery if possible. We also discussed the possible future knee replacement, however she is not interested in a surgical option at this time. At today's visit, we did decide to continue with conservative management, and we have discussed the risks of repeat injections. Orders/referrals were placed as below at today's visit. She may continue to use her over-the-counter knee sleeve, Voltaren gel as needed, and physical therapy/home exercises.     After discussing her options, both surgical and nonsurgical, she wished to proceed with bilateral knee injections today as below. All questions were answered and the patient agrees with the above plan. The patient will return to clinic in 3 months without x-rays. At her next visit, we may consider bilateral knee gel injections (prior authorization was begun at this point), versus possible right knee arthroscopic debridement as above. KNEE INJECTION PROCEDURE NOTE: After discussing the risks/benefits/alternatives to injection, an informed consent was obtained verbally. The bilateral knee was verified as the correct location and allergies were reviewed. The skin overlying the injection site was cleaned with an alcohol swab followed by a local sterile prep. A 22 gauge needle was introduced into the above location under sterile conditions. A mixture of 40 mg of Kenalog and 4 mL of 1% Lidocaine without epinephrine was injected into each knee. The patient was noted to tolerate the procedure well without immediate complication. A dressing was applied and verbal instruction/education was provided. No follow-ups on file. No orders of the defined types were placed in this encounter. No orders of the defined types were placed in this encounter. Nathaniel Han MD  Orthopedic Surgery        Please excuse any typos/errors, as this note was created with the assistance of voice recognition software. While intending to generate a document that actually reflects the content of the visit, the document can still have some errors including those of syntax and sound-a-like substitutions which may escape proof reading. In such instances, actual meaning can be extrapolated by context.

## 2022-06-08 ENCOUNTER — TELEPHONE (OUTPATIENT)
Dept: ORTHOPEDIC SURGERY | Age: 77
End: 2022-06-08

## 2022-06-08 NOTE — TELEPHONE ENCOUNTER
6/7 & 6/8 MSG LEFT FOR PT - INFORMING HER SHE CAN BE SCHD FOR \"GEL\" INJECTIONS AT ANYTIME AND TO CALL BACK FOR AN APPT W/dR REZA

## 2022-06-23 ENCOUNTER — OFFICE VISIT (OUTPATIENT)
Dept: ORTHOPEDIC SURGERY | Age: 77
End: 2022-06-23
Payer: MEDICARE

## 2022-06-23 VITALS — HEIGHT: 62 IN | WEIGHT: 180 LBS | OXYGEN SATURATION: 100 % | BODY MASS INDEX: 33.13 KG/M2 | RESPIRATION RATE: 16 BRPM

## 2022-06-23 DIAGNOSIS — S83.206D ACUTE MENISCAL TEAR OF RIGHT KNEE, SUBSEQUENT ENCOUNTER: ICD-10-CM

## 2022-06-23 DIAGNOSIS — M17.0 PRIMARY OSTEOARTHRITIS OF BOTH KNEES: Primary | ICD-10-CM

## 2022-06-23 PROCEDURE — 1090F PRES/ABSN URINE INCON ASSESS: CPT | Performed by: ORTHOPAEDIC SURGERY

## 2022-06-23 PROCEDURE — 3017F COLORECTAL CA SCREEN DOC REV: CPT | Performed by: ORTHOPAEDIC SURGERY

## 2022-06-23 PROCEDURE — 1036F TOBACCO NON-USER: CPT | Performed by: ORTHOPAEDIC SURGERY

## 2022-06-23 PROCEDURE — G8417 CALC BMI ABV UP PARAM F/U: HCPCS | Performed by: ORTHOPAEDIC SURGERY

## 2022-06-23 PROCEDURE — G8427 DOCREV CUR MEDS BY ELIG CLIN: HCPCS | Performed by: ORTHOPAEDIC SURGERY

## 2022-06-23 PROCEDURE — 1123F ACP DISCUSS/DSCN MKR DOCD: CPT | Performed by: ORTHOPAEDIC SURGERY

## 2022-06-23 PROCEDURE — 20610 DRAIN/INJ JOINT/BURSA W/O US: CPT | Performed by: ORTHOPAEDIC SURGERY

## 2022-06-23 PROCEDURE — G8400 PT W/DXA NO RESULTS DOC: HCPCS | Performed by: ORTHOPAEDIC SURGERY

## 2022-06-23 NOTE — PROGRESS NOTES
[address] [place] [address] AND SPORTS MEDICINE  [facility]  [address] [place] [address]  Dept: [phone]    Ambulatory Orthopedic Consult      CHIEF COMPLAINT:    Chief Complaint   Patient presents with    Knee Pain     Bilateral       HISTORY OF PRESENT ILLNESS:      The patient is a 78 y.o. female who is being seen for consultation and evaluation of pain at the anterior knee, which began secondary to an injury on 1/17/2021. The pain is described mainly with mechanical terms (dull/sharp/throbbing). The pain is worse with activity and better with rest. The patient reports a progressive course. The patient has tried:      [x]  rest/activity modification          []  NSAIDs      []  opiates      []  orthotics        []  change in shoes   [x]  home exercises  []  physical therapy      []  CAM boot     [x]  brace:    []  injection:       []  surgery:      I recently saw the patient in the hospital for this problem. INTERVAL HISTORY 3/26/2021:  She is seen again today in the office for follow up of a previous issue (as above). Since being seen last, the patient is doing better. At today's visit, she is not using a brace or assistive device. The location and quality of the pain have not significantly changed since the last visit. INTERVAL HISTORY 6/11/2021:  She is seen again today in the office for evaluation of a new issue as above, which began many years ago atraumatically (localizes the pain to her left knee medially greater than laterally)  . At today's visit, she is using no brace/assistive device. History is obtained today from:   [x]  the patient     []  EMR     []  one family member/friend    []  multiple family members/friends    []  other:      Regarding the previous issue, she reports the previous problem is doing worse.      INTERVAL HISTORY 11/11/2021:  She is seen again today in the office for follow up of a previous issue (as above). Since being seen last, the patient is doing better. At today's visit, she is not using a brace or assistive device. History is obtained today from:   [x]  the patient     []  EMR     []  one family member/friend    []  multiple family members/friends    []  other:      INTERVAL HISTORY 1/27/2022:  She is seen again today in the office for follow up of a previous issue (as above). Since being seen last, the patient is doing worse. At today's visit, she is not using a brace or assistive device. History is obtained today from:   [x]  the patient     []  EMR     []  one family member/friend    []  multiple family members/friends    []  other:      INTERVAL HISTORY 5/26/2022:  She is seen again today in the office for follow up of a previous issue (as above). Since being seen last, the patient is doing better overall, but reports that she is experiencing pain more often. At today's visit, she is not using a brace or assistive device. History is obtained today from:   [x]  the patient     []  EMR     []  one family member/friend    []  multiple family members/friends    []  other:      INTERVAL HISTORY 6/23/2022:  She is seen again today in the office for follow up of a previous issue (as above). Since being seen last, the patient is doing worse. At today's visit, she is not using a brace or assistive device.     History is obtained today from:   [x]  the patient     []  EMR     []  one family member/friend    []  multiple family members/friends    []  other:          REVIEW OF SYSTEMS:  Musculoskeletal: See HPI for pertinent positives     Past Medical History:    She  has a past medical history of Age-related osteoporosis without current pathological fracture, Arthritis (2012), Fibromyalgia (2012), GERD (gastroesophageal reflux disease) (DX PRIOR TO 2004), GERD (gastroesophageal reflux disease), Hyperlipidemia, Hypertension, Kidney stone (2012 & 2013), Pneumonia (2012), Recurrent UTI, Sleep apnea (), Vision abnormalities (), and Wears glasses. Past Surgical History:    She  has a past surgical history that includes Hysterectomy (); Appendectomy (); Tonsillectomy ();  section, classic (, , & ); vitrectomy (Right, 2014); Colonoscopy; Colonoscopy (N/A, 10/31/2019); and Upper gastrointestinal endoscopy (N/A, 2020). Current Medications:     Current Outpatient Medications:     ondansetron (ZOFRAN ODT) 4 MG disintegrating tablet, Take 1 tablet by mouth every 8 hours as needed for Nausea, Disp: 20 tablet, Rfl: 0    omeprazole (PRILOSEC) 20 MG delayed release capsule, Take 1 capsule by mouth Daily 30 minutes before dinner, Disp: 90 capsule, Rfl: 3    diclofenac sodium (VOLTAREN) 1 % GEL, Apply 2 g topically 2 times daily, Disp: 350 g, Rfl: 0    diclofenac sodium (VOLTAREN) 1 % GEL, Apply 2 g topically 2 times daily (Patient not taking: Reported on 3/26/2021), Disp: 350 g, Rfl: 0    clonazePAM (KLONOPIN) 0.5 MG tablet, Take 1 tablet by mouth 2 times daily for 30 days. , Disp: 60 tablet, Rfl: 0    lisinopril (PRINIVIL;ZESTRIL) 10 MG tablet, Take 1 tablet by mouth daily, Disp: 30 tablet, Rfl: 0     Allergies:    Cefdinir, Morphine, Avelox [moxifloxacin], Ciprofloxacin, Quinolones, Statins, and Sulfa antibiotics    Family History:  family history includes Arthritis in her mother; Cancer in her maternal grandmother; No Known Problems in her father.     Social History:   Social History     Occupational History    Not on file   Tobacco Use    Smoking status: Former Smoker     Packs/day: 1.00     Years: 20.00     Pack years: 20.00     Quit date: 10/31/1994     Years since quittin.6    Smokeless tobacco: Never Used   Vaping Use    Vaping Use: Never used   Substance and Sexual Activity    Alcohol use: Yes     Comment: 2 DRINKS A WEEK    Drug use: No    Sexual activity: Not on file     Occupation: Retired     OBJECTIVE:  Resp 16   Ht 5' 2\" (7.109 m)   Wt 180 lb (81.6 kg)   LMP 10/31/1975 (Approximate)   SpO2 100%   BMI 32.92 kg/m²    Psych: alert and oriented to person, time, and place  Cardio:  well perfused extremities  Resp:  normal respiratory effort  Skin:  no cyanosis  Hem/lymph:  no lymphedema  Neuro:  sensation to light touch grossly intact throughout all nerve distributions in the foot   Musculoskeletal:    RLE:  Vascular: Limb well perfused, compartments soft/compressible. Skin: No erythema/ulcers. Intact. Neurovascular Status:  Grossly neurovascularly intact throughout   Tenderness to Palpation:  anteromedial and anterolateral knee joint line  -Positive Gerry's test for pain  -Stable ligamentous exam      LLE:  Vascular: Limb well perfused, compartments soft/compressible. Skin: No erythema/ulcers. Intact. Neurovascular Status:  Grossly neurovascularly intact throughout   Tenderness to Palpation:  anteromedial and anterolateral knee joint line  -Positive Gerry's test for pain  -Stable ligamentous exam          RADIOLOGY:   6/23/2022 No new radiology images today. Prior images reviewed for reference. FINDINGS:  Four weightbearing views (AP, Tunnel, Lateral, and Sunrise) of the left knee were obtained in the office today and reviewed, revealing no acute fracture, dislocation, or radioopaque foreign body/tumor. Overall alignment is satisfactory. Tricompartmental degenerative changes of the knee with joint narrowing, sclerosis, and osteophytes. IMPRESSION: No acute fracture/dislocation. Degenerative changes as above. Electronically signed by Kathi Marte MD        FINDINGS:  Four weightbearing views (AP, Lateral, Tunnel and Sunrise) of the right knee were obtained in the office today and reviewed, revealing no acute fracture, dislocation, or radioopaque foreign body/tumor. Overall alignment is satisfactory. IMPRESSION: No acute fracture/dislocation.      Electronically signed by Kathi Marte MD        -MRI from 1/18/2021 previously reviewed, both images and report as below:  Abnormal signal and morphology of the lateral meniscus anterior horn   extending to the anterior root attachment suspicious for a tear.  No medial   meniscus tear identified.       Cruciate and collateral ligaments are intact.       No acute fracture or significant bone marrow edema.       Small knee joint effusion.       Tricompartmental osteoarthrosis, most evident at the patella where there are   areas of full-thickness cartilage loss.           ASSESSMENT AND PLAN:  Body mass index is 32.92 kg/m². She has bilateral knee pain, secondary to tricompartmental osteoarthritis (bilateral knee corticosteroid injections on 6/11/2021 helped her pain approximately 75%; bilateral knee injections on 11/11/2021 helped her pain approximately 75%; bilateral knee corticosteroid injections on 5/26/2022 helped her pain approximately 80%), along with a right knee meniscal tear secondary to injury that occurred on 1/17/2021 (MRI showed a tear of the anterior horn of the lateral meniscus, as well as tricompartmental arthritis with full-thickness cartilage loss underneath the patella). At today's visit, she states that her right knee is worse than the left in terms of pain. Notably, she has the past medical history as above. She has a history of COPD, major depressive disorder, and GERD. We had a discussion about the likely diagnosis and its natural history, physical exam and imaging findings, as well as treatment options in detail. Surgically, we again discussed a possible right knee arthroscopic meniscal debridement, however, we discussed the risks of incomplete pain relief, particularly given her tricompartmental osteoarthritis with full thickness cartilage loss in the patellofemoral component. At this time, she is not interested in surgery, and does wish to avoid surgery if possible.   We also discussed a possible future knee replacement, however she is not interested in a surgical option at this time. At today's visit, we did decide to continue with conservative management, and we have discussed the risks of repeat injections. Orders/referrals were placed as below at today's visit. She may continue to use her over-the-counter knee sleeve, Voltaren gel as needed, and physical therapy/home exercises. After discussing her options, both surgical and nonsurgical, she wished to proceed with bilateral knee gel injections today as below. All questions were answered and the patient agrees with the above plan. The patient will return to clinic in 2 to 3 months without x-rays. At her next visit, we may again consider a right knee arthroscopic surgery as above versus repeat injections. KNEE INJECTION PROCEDURE NOTE: After discussing the risks/benefits/alternatives to injection, an informed consent was obtained. The bilateral knee was verified as the correct location and allergies were reviewed. The skin overlying the injection site was cleaned with an alcohol swab followed by a local sterile prep. A 22 gauge needle was introduced into the above location under sterile conditions. 4 mL of Monovisc was injected into each knee. The patient was noted to tolerate the procedure well without immediate complication. A dressing was applied and verbal instruction/education was provided. No follow-ups on file. No orders of the defined types were placed in this encounter. No orders of the defined types were placed in this encounter. Timo Mcfadden MD  Orthopedic Surgery        Please excuse any typos/errors, as this note was created with the assistance of voice recognition software. While intending to generate a document that actually reflects the content of the visit, the document can still have some errors including those of syntax and sound-a-like substitutions which may escape proof reading.  In such instances, actual meaning can be extrapolated by context.

## 2022-08-25 ENCOUNTER — OFFICE VISIT (OUTPATIENT)
Dept: ORTHOPEDIC SURGERY | Age: 77
End: 2022-08-25
Payer: MEDICARE

## 2022-08-25 VITALS — RESPIRATION RATE: 16 BRPM | HEIGHT: 62 IN | BODY MASS INDEX: 33.13 KG/M2 | WEIGHT: 180 LBS | OXYGEN SATURATION: 100 %

## 2022-08-25 DIAGNOSIS — M17.0 PRIMARY OSTEOARTHRITIS OF BOTH KNEES: Primary | ICD-10-CM

## 2022-08-25 DIAGNOSIS — M23.306 DEGENERATIVE TEAR OF MENISCUS OF RIGHT KNEE: ICD-10-CM

## 2022-08-25 PROCEDURE — 20610 DRAIN/INJ JOINT/BURSA W/O US: CPT | Performed by: ORTHOPAEDIC SURGERY

## 2022-08-25 RX ORDER — TRIAMCINOLONE ACETONIDE 40 MG/ML
40 INJECTION, SUSPENSION INTRA-ARTICULAR; INTRAMUSCULAR ONCE
Status: COMPLETED | OUTPATIENT
Start: 2022-08-25 | End: 2022-08-25

## 2022-08-25 RX ORDER — LIDOCAINE HYDROCHLORIDE 10 MG/ML
4 INJECTION, SOLUTION INFILTRATION; PERINEURAL ONCE
Status: COMPLETED | OUTPATIENT
Start: 2022-08-25 | End: 2022-08-25

## 2022-08-25 RX ADMIN — TRIAMCINOLONE ACETONIDE 40 MG: 40 INJECTION, SUSPENSION INTRA-ARTICULAR; INTRAMUSCULAR at 12:30

## 2022-08-25 RX ADMIN — LIDOCAINE HYDROCHLORIDE 4 ML: 10 INJECTION, SOLUTION INFILTRATION; PERINEURAL at 12:30

## 2022-08-25 NOTE — PROGRESS NOTES
[address] [place] [address] AND SPORTS MEDICINE  [facility]  [address] [facility] [address]  Dept: [phone]    Ambulatory Orthopedic Consult      CHIEF COMPLAINT:    Chief Complaint   Patient presents with    Knee Pain     Bilateral       HISTORY OF PRESENT ILLNESS:      The patient is a 78 y.o. female who is being seen for consultation and evaluation of pain at the anterior knee, which began secondary to an injury on 1/17/2021. The pain is described mainly with mechanical terms (dull/sharp/throbbing). The pain is worse with activity and better with rest. The patient reports a progressive course. The patient has tried:      [x]  rest/activity modification          []  NSAIDs      []  opiates      []  orthotics        []  change in shoes   [x]  home exercises  []  physical therapy      []  CAM boot     [x]  brace:    []  injection:       []  surgery:      I recently saw the patient in the hospital for this problem. INTERVAL HISTORY 3/26/2021:  She is seen again today in the office for follow up of a previous issue (as above). Since being seen last, the patient is doing better. At today's visit, she is not using a brace or assistive device. The location and quality of the pain have not significantly changed since the last visit. INTERVAL HISTORY 6/11/2021:  She is seen again today in the office for evaluation of a new issue as above, which began many years ago atraumatically (localizes the pain to her left knee medially greater than laterally)  . At today's visit, she is using no brace/assistive device. History is obtained today from:   [x]  the patient     []  EMR     []  one family member/friend    []  multiple family members/friends    []  other:      Regarding the previous issue, she reports the previous problem is doing worse.      INTERVAL HISTORY 11/11/2021:  She is seen again today in the office for follow up of a previous issue (as above). Since being seen last, the patient is doing better. At today's visit, she is not using a brace or assistive device. History is obtained today from:   [x]  the patient     []  EMR     []  one family member/friend    []  multiple family members/friends    []  other:      INTERVAL HISTORY 1/27/2022:  She is seen again today in the office for follow up of a previous issue (as above). Since being seen last, the patient is doing worse. At today's visit, she is not using a brace or assistive device. History is obtained today from:   [x]  the patient     []  EMR     []  one family member/friend    []  multiple family members/friends    []  other:      INTERVAL HISTORY 5/26/2022:  She is seen again today in the office for follow up of a previous issue (as above). Since being seen last, the patient is doing better overall, but reports that she is experiencing pain more often. At today's visit, she is not using a brace or assistive device. History is obtained today from:   [x]  the patient     []  EMR     []  one family member/friend    []  multiple family members/friends    []  other:      INTERVAL HISTORY 6/23/2022:  She is seen again today in the office for follow up of a previous issue (as above). Since being seen last, the patient is doing worse. At today's visit, she is not using a brace or assistive device. History is obtained today from:   [x]  the patient     []  EMR     []  one family member/friend    []  multiple family members/friends    []  other:      INTERVAL HISTORY 8/25/2022:  She is seen again today in the office for follow up of a previous issue (as above). Since being seen last, the patient is doing about the same overall, but reports that her right knee pain is occurring more often. At today's visit, she is not using a brace or assistive device.     History is obtained today from:   [x]  the patient     []  EMR     []  one family member/friend    []  multiple family members/friends    []  other:        REVIEW OF SYSTEMS:  Musculoskeletal: See HPI for pertinent positives     Past Medical History:    She  has a past medical history of Age-related osteoporosis without current pathological fracture, Arthritis (), Fibromyalgia (), GERD (gastroesophageal reflux disease) (DX PRIOR TO ), GERD (gastroesophageal reflux disease), Hyperlipidemia, Hypertension, Kidney stone ( & ), Pneumonia (), Recurrent UTI, Sleep apnea (), Vision abnormalities (), and Wears glasses. Past Surgical History:    She  has a past surgical history that includes Hysterectomy (); Appendectomy (); Tonsillectomy ();  section, classic (, , & ); vitrectomy (Right, 2014); Colonoscopy; Colonoscopy (N/A, 10/31/2019); and Upper gastrointestinal endoscopy (N/A, 2020). Current Medications:     Current Outpatient Medications:     ondansetron (ZOFRAN ODT) 4 MG disintegrating tablet, Take 1 tablet by mouth every 8 hours as needed for Nausea, Disp: 20 tablet, Rfl: 0    omeprazole (PRILOSEC) 20 MG delayed release capsule, Take 1 capsule by mouth Daily 30 minutes before dinner, Disp: 90 capsule, Rfl: 3    diclofenac sodium (VOLTAREN) 1 % GEL, Apply 2 g topically 2 times daily, Disp: 350 g, Rfl: 0    diclofenac sodium (VOLTAREN) 1 % GEL, Apply 2 g topically 2 times daily (Patient not taking: Reported on 3/26/2021), Disp: 350 g, Rfl: 0    clonazePAM (KLONOPIN) 0.5 MG tablet, Take 1 tablet by mouth 2 times daily for 30 days. , Disp: 60 tablet, Rfl: 0    lisinopril (PRINIVIL;ZESTRIL) 10 MG tablet, Take 1 tablet by mouth daily, Disp: 30 tablet, Rfl: 0     Allergies:    Cefdinir, Morphine, Avelox [moxifloxacin], Ciprofloxacin, Quinolones, Statins, and Sulfa antibiotics    Family History:  family history includes Arthritis in her mother; Cancer in her maternal grandmother; No Known Problems in her father.     Social History:   Social History Occupational History    Not on file   Tobacco Use    Smoking status: Former     Packs/day: 1.00     Years: 20.00     Pack years: 20.00     Types: Cigarettes     Quit date: 10/31/1994     Years since quittin.8    Smokeless tobacco: Never   Vaping Use    Vaping Use: Never used   Substance and Sexual Activity    Alcohol use: Yes     Comment: 2 DRINKS A WEEK    Drug use: No    Sexual activity: Not on file     Occupation: Retired     OBJECTIVE:  Resp 16   Ht 5' 2\" (1.575 m)   Wt 180 lb (81.6 kg)   LMP 10/31/1975 (Approximate)   SpO2 100%   BMI 32.92 kg/m²    Psych: alert and oriented to person, time, and place  Cardio:  well perfused extremities  Resp:  normal respiratory effort  Skin:  no cyanosis  Hem/lymph:  no lymphedema  Neuro:  sensation to light touch grossly intact throughout all nerve distributions in the foot   Musculoskeletal:    RLE:  Vascular: Limb well perfused, compartments soft/compressible. Skin: No erythema/ulcers. Intact. Neurovascular Status:  Grossly neurovascularly intact throughout   Tenderness to Palpation:  anteromedial and anterolateral knee joint line  -Positive Gerry's test for pain  -Stable ligamentous exam      LLE:  Vascular: Limb well perfused, compartments soft/compressible. Skin: No erythema/ulcers. Intact. Neurovascular Status:  Grossly neurovascularly intact throughout   Tenderness to Palpation:  anteromedial and anterolateral knee joint line  -Positive Gerry's test for pain  -Stable ligamentous exam          RADIOLOGY:   2022 No new radiology images today. Prior images reviewed for reference. FINDINGS:  Four weightbearing views (AP, Tunnel, Lateral, and Sunrise) of the left knee were obtained in the office today and reviewed, revealing no acute fracture, dislocation, or radioopaque foreign body/tumor. Overall alignment is satisfactory. Tricompartmental degenerative changes of the knee with joint narrowing, sclerosis, and osteophytes. IMPRESSION: No acute fracture/dislocation. Degenerative changes as above. Electronically signed by Oleg Dillard MD        FINDINGS:  Four weightbearing views (AP, Lateral, Tunnel and Sunrise) of the right knee were obtained in the office today and reviewed, revealing no acute fracture, dislocation, or radioopaque foreign body/tumor. Overall alignment is satisfactory. IMPRESSION: No acute fracture/dislocation. Electronically signed by Oleg Dillard MD        -Right knee MRI from 1/18/2021 previously reviewed, both images and report as below:  Abnormal signal and morphology of the lateral meniscus anterior horn   extending to the anterior root attachment suspicious for a tear. No medial   meniscus tear identified. Cruciate and collateral ligaments are intact. No acute fracture or significant bone marrow edema. Small knee joint effusion. Tricompartmental osteoarthrosis, most evident at the patella where there are   areas of full-thickness cartilage loss. ASSESSMENT AND PLAN:  Body mass index is 32.92 kg/m². She has bilateral knee pain, secondary to tricompartmental osteoarthritis (bilateral knee corticosteroid injections on 6/11/2021 helped her pain approximately 75%; bilateral knee injections on 11/11/2021 helped her pain approximately 75%; bilateral knee corticosteroid injections on 5/26/2022 helped her pain approximately 80%; bilateral knee Monovisc injections on 6/23/2022 helped her pain approximately 75% on the left, but did not help on the right), along with a right knee meniscal tear secondary to injury that occurred on 1/17/2021 (MRI showed a tear of the anterior horn of the lateral meniscus, as well as tricompartmental arthritis with full-thickness cartilage loss underneath the patella). At today's visit, she states that her right knee is worse than the left in terms of pain. Notably, she has the past medical history as above.   She has a history of COPD, major depressive disorder, and GERD. We had a discussion about the likely diagnosis and its natural history, physical exam and imaging findings, as well as treatment options in detail. Surgically, we again discussed a possible right knee arthroscopic meniscal debridement, however, we discussed the risks of incomplete pain relief, particularly given her tricompartmental osteoarthritis with full thickness cartilage loss in the patellofemoral component. At today's visit, she does again state that she is not interested in surgery, and states that missing work at this time is not acceptable to her, and it is very important for her to continue working. We have also discussed the possible future knee replacement. At today's visit, we have decided to continue with conservative management. Orders/referrals were placed as below at today's visit. She may continue to use her over-the-counter knee sleeve, Voltaren gel as needed, and physical therapy/home exercises. After discussing her options, both surgical and nonsurgical, she wished to proceed with bilateral knee steroid injections today as below. All questions were answered and the patient agrees with the above plan. The patient will return to clinic in 6 to 12 weeks (in 6 weeks if her [employer] preauthorization has been approved). KNEE INJECTION PROCEDURE NOTE: After discussing the risks/benefits/alternatives to injection, an informed consent was obtained. The bilateral knee was verified as the correct location and allergies were reviewed. The skin overlying the injection site was cleaned with an alcohol swab followed by a local sterile prep. A 22 gauge needle was introduced into the above location under sterile conditions. A mixture of 40 mg of Kenalog and 4 mL of 1% Lidocaine without epinephrine was injected into each knee. The patient was noted to tolerate the procedure well without immediate complication.  A dressing was applied and verbal instruction/education was provided. No follow-ups on file. No orders of the defined types were placed in this encounter. No orders of the defined types were placed in this encounter. Miguel Angel Mendez MD  Orthopedic Surgery        Please excuse any typos/errors, as this note was created with the assistance of voice recognition software. While intending to generate a document that actually reflects the content of the visit, the document can still have some errors including those of syntax and sound-a-like substitutions which may escape proof reading. In such instances, actual meaning can be extrapolated by context.

## 2024-12-21 ENCOUNTER — HOSPITAL ENCOUNTER (EMERGENCY)
Age: 79
Discharge: LEFT BEFORE DISCHARGE | End: 2024-12-21

## 2025-04-08 ENCOUNTER — APPOINTMENT (OUTPATIENT)
Dept: CT IMAGING | Age: 80
End: 2025-04-08
Payer: MEDICARE

## 2025-04-08 ENCOUNTER — HOSPITAL ENCOUNTER (EMERGENCY)
Age: 80
Discharge: HOME | End: 2025-04-08
Attending: EMERGENCY MEDICINE
Payer: MEDICARE

## 2025-04-08 VITALS
HEIGHT: 61 IN | TEMPERATURE: 98.1 F | SYSTOLIC BLOOD PRESSURE: 184 MMHG | OXYGEN SATURATION: 96 % | DIASTOLIC BLOOD PRESSURE: 83 MMHG | HEART RATE: 73 BPM | WEIGHT: 186 LBS | RESPIRATION RATE: 18 BRPM | BODY MASS INDEX: 35.12 KG/M2

## 2025-04-08 DIAGNOSIS — N20.0 KIDNEY STONE: Primary | ICD-10-CM

## 2025-04-08 DIAGNOSIS — N30.01 ACUTE CYSTITIS WITH HEMATURIA: ICD-10-CM

## 2025-04-08 LAB
ALBUMIN SERPL-MCNC: 4 G/DL (ref 3.5–5.2)
ALBUMIN/GLOB SERPL: 1.3 {RATIO} (ref 1–2.5)
ALP SERPL-CCNC: 75 U/L (ref 35–104)
ALT SERPL-CCNC: 25 U/L (ref 10–35)
ANION GAP SERPL CALCULATED.3IONS-SCNC: 12 MMOL/L (ref 9–16)
AST SERPL-CCNC: 23 U/L (ref 10–35)
BACTERIA URNS QL MICRO: ABNORMAL
BASOPHILS # BLD: 0.08 K/UL (ref 0–0.2)
BASOPHILS NFR BLD: 1 % (ref 0–2)
BILIRUB SERPL-MCNC: 0.3 MG/DL (ref 0–1.2)
BILIRUB UR QL STRIP: NEGATIVE
BUN SERPL-MCNC: 22 MG/DL (ref 8–23)
CALCIUM SERPL-MCNC: 9.1 MG/DL (ref 8.8–10.2)
CHLORIDE SERPL-SCNC: 103 MMOL/L (ref 98–107)
CLARITY UR: ABNORMAL
CO2 SERPL-SCNC: 23 MMOL/L (ref 20–31)
COLOR UR: YELLOW
CREAT SERPL-MCNC: 1 MG/DL (ref 0.5–0.9)
EOSINOPHIL # BLD: 0.2 K/UL (ref 0–0.44)
EOSINOPHILS RELATIVE PERCENT: 2 % (ref 1–4)
EPI CELLS #/AREA URNS HPF: ABNORMAL /HPF (ref 0–5)
ERYTHROCYTE [DISTWIDTH] IN BLOOD BY AUTOMATED COUNT: 14.3 % (ref 11.8–14.4)
GFR, ESTIMATED: 59 ML/MIN/1.73M2
GLUCOSE SERPL-MCNC: 82 MG/DL (ref 82–115)
GLUCOSE UR STRIP-MCNC: NEGATIVE MG/DL
HCT VFR BLD AUTO: 39.6 % (ref 36.3–47.1)
HGB BLD-MCNC: 13.2 G/DL (ref 11.9–15.1)
HGB UR QL STRIP.AUTO: ABNORMAL
IMM GRANULOCYTES # BLD AUTO: 0.04 K/UL (ref 0–0.3)
IMM GRANULOCYTES NFR BLD: 0 %
KETONES UR STRIP-MCNC: NEGATIVE MG/DL
LEUKOCYTE ESTERASE UR QL STRIP: ABNORMAL
LYMPHOCYTES NFR BLD: 2.84 K/UL (ref 1.1–3.7)
LYMPHOCYTES RELATIVE PERCENT: 27 % (ref 24–43)
MCH RBC QN AUTO: 29.8 PG (ref 25.2–33.5)
MCHC RBC AUTO-ENTMCNC: 33.3 G/DL (ref 28.4–34.8)
MCV RBC AUTO: 89.4 FL (ref 82.6–102.9)
MONOCYTES NFR BLD: 0.9 K/UL (ref 0.1–1.2)
MONOCYTES NFR BLD: 9 % (ref 3–12)
MUCOUS THREADS URNS QL MICRO: ABNORMAL
NEUTROPHILS NFR BLD: 61 % (ref 36–65)
NEUTS SEG NFR BLD: 6.46 K/UL (ref 1.5–8.1)
NITRITE UR QL STRIP: NEGATIVE
NRBC BLD-RTO: 0 PER 100 WBC
PH UR STRIP: 6 [PH] (ref 5–8)
PLATELET # BLD AUTO: 323 K/UL (ref 138–453)
PMV BLD AUTO: 10.5 FL (ref 8.1–13.5)
POTASSIUM SERPL-SCNC: 4.3 MMOL/L (ref 3.7–5.3)
PROT SERPL-MCNC: 7 G/DL (ref 6.6–8.7)
PROT UR STRIP-MCNC: NEGATIVE MG/DL
RBC # BLD AUTO: 4.43 M/UL (ref 3.95–5.11)
RBC #/AREA URNS HPF: ABNORMAL /HPF (ref 0–2)
SODIUM SERPL-SCNC: 138 MMOL/L (ref 136–145)
SP GR UR STRIP: 1.02 (ref 1–1.03)
UROBILINOGEN UR STRIP-ACNC: NORMAL EU/DL (ref 0–1)
WBC #/AREA URNS HPF: ABNORMAL /HPF (ref 0–5)
WBC OTHER # BLD: 10.5 K/UL (ref 3.5–11.3)

## 2025-04-08 PROCEDURE — 99284 EMERGENCY DEPT VISIT MOD MDM: CPT

## 2025-04-08 PROCEDURE — 96375 TX/PRO/DX INJ NEW DRUG ADDON: CPT

## 2025-04-08 PROCEDURE — 6360000002 HC RX W HCPCS: Performed by: NURSE PRACTITIONER

## 2025-04-08 PROCEDURE — 85025 COMPLETE CBC W/AUTO DIFF WBC: CPT

## 2025-04-08 PROCEDURE — 81001 URINALYSIS AUTO W/SCOPE: CPT

## 2025-04-08 PROCEDURE — 80053 COMPREHEN METABOLIC PANEL: CPT

## 2025-04-08 PROCEDURE — 96374 THER/PROPH/DIAG INJ IV PUSH: CPT

## 2025-04-08 PROCEDURE — 74176 CT ABD & PELVIS W/O CONTRAST: CPT

## 2025-04-08 RX ORDER — TAMSULOSIN HYDROCHLORIDE 0.4 MG/1
0.4 CAPSULE ORAL DAILY
Qty: 14 CAPSULE | Refills: 0 | Status: SHIPPED | OUTPATIENT
Start: 2025-04-08 | End: 2025-04-22

## 2025-04-08 RX ORDER — KETOROLAC TROMETHAMINE 15 MG/ML
15 INJECTION, SOLUTION INTRAMUSCULAR; INTRAVENOUS ONCE
Status: COMPLETED | OUTPATIENT
Start: 2025-04-08 | End: 2025-04-08

## 2025-04-08 RX ORDER — GRANULES FOR ORAL 3 G/1
3 POWDER ORAL
Qty: 3 EACH | Refills: 0 | Status: SHIPPED | OUTPATIENT
Start: 2025-04-08 | End: 2025-04-13

## 2025-04-08 RX ORDER — ONDANSETRON 2 MG/ML
4 INJECTION INTRAMUSCULAR; INTRAVENOUS ONCE
Status: COMPLETED | OUTPATIENT
Start: 2025-04-08 | End: 2025-04-08

## 2025-04-08 RX ADMIN — ONDANSETRON 4 MG: 2 INJECTION, SOLUTION INTRAMUSCULAR; INTRAVENOUS at 16:41

## 2025-04-08 RX ADMIN — KETOROLAC TROMETHAMINE 15 MG: 15 INJECTION, SOLUTION INTRAMUSCULAR; INTRAVENOUS at 16:41

## 2025-04-08 ASSESSMENT — PAIN DESCRIPTION - DESCRIPTORS: DESCRIPTORS: ACHING;THROBBING

## 2025-04-08 ASSESSMENT — PAIN DESCRIPTION - ORIENTATION: ORIENTATION: RIGHT

## 2025-04-08 ASSESSMENT — LIFESTYLE VARIABLES
HOW MANY STANDARD DRINKS CONTAINING ALCOHOL DO YOU HAVE ON A TYPICAL DAY: 1 OR 2
HOW OFTEN DO YOU HAVE A DRINK CONTAINING ALCOHOL: MONTHLY OR LESS

## 2025-04-08 ASSESSMENT — PAIN DESCRIPTION - LOCATION: LOCATION: FLANK

## 2025-04-08 ASSESSMENT — PAIN SCALES - GENERAL
PAINLEVEL_OUTOF10: 6
PAINLEVEL_OUTOF10: 7

## 2025-04-08 ASSESSMENT — PAIN - FUNCTIONAL ASSESSMENT: PAIN_FUNCTIONAL_ASSESSMENT: 0-10

## 2025-04-08 NOTE — DISCHARGE INSTRUCTIONS
Take antibiotics as prescribed.  Additionally take Flomax as prescribed -> this will increase frequency of urination.  May take Tylenol or ibuprofen as needed for any pain or fevers if this occurs.  Stay hydrated and drink plenty of fluids.  Strain your urine. Follow-up with urology for reevaluation as discussed.

## 2025-04-08 NOTE — ED PROVIDER NOTES
EMERGENCY DEPARTMENT ENCOUNTER   ATTENDING ATTESTATION   Pt Name: Ewa Johnson  MRN: [id number]  Birthdate 1945  Date of evaluation: 4/8/25   Ewa Johnson is a 79 y.o. female with CC: Flank Pain (Right-sided for one week. ) and Urinary Frequency    MDM:   I performed a substantive part of the MDM during the patient's E/M visit. I personally made or approved the documented management plan and acknowledge its risk of complications.           CRITICAL CARE:       EKG: All EKG's are interpreted by the Emergency Department Physician who either signs or Co-signs this chart in the absence of a cardiologist.      RADIOLOGY:All plain film, CT, MRI, and formal ultrasound images (except ED bedside ultrasound) are read by the radiologist, see reports below, unless otherwise noted in MDM or here.  CT ABDOMEN PELVIS WO CONTRAST Additional Contrast? None   Final Result   1. Obstructive uropathy with 5 mm calculus in the distal left ureter.   2. Fatty liver.           LABS: All lab results were reviewed by myself, and all abnormals are listed below.  Labs Reviewed   COMPREHENSIVE METABOLIC PANEL - Abnormal; Notable for the following components:       Result Value    Creatinine 1.0 (*)     Est, Glom Filt Rate 59 (*)     All other components within normal limits   URINALYSIS - Abnormal; Notable for the following components:    Turbidity UA SLIGHTLY CLOUDY (*)     Urine Hgb 2+ (*)     Leukocyte Esterase, Urine TRACE (*)     All other components within normal limits   MICROSCOPIC URINALYSIS - Abnormal; Notable for the following components:    Bacteria, UA FEW (*)     Mucus, UA 2+ (*)     All other components within normal limits   CBC WITH AUTO DIFFERENTIAL     CONSULTS:  None  FINAL IMPRESSION      1. Kidney stone    2. Acute cystitis with hematuria            PASTMEDICAL HISTORY     Past Medical History:   Diagnosis Date    Age-related osteoporosis without current pathological fracture     Arthritis 2012    GENERALIZED    
injection 15 mg (15 mg IntraVENous Given 4/8/25 1641)   ondansetron (ZOFRAN) injection 4 mg (4 mg IntraVENous Given 4/8/25 1641)         ED ORDERS:  Orders Placed This Encounter   Procedures    CT ABDOMEN PELVIS WO CONTRAST Additional Contrast? None     Standing Status:   Standing     Number of Occurrences:   1     Reason for exam::   Right flank pain, ?  Hematuria     Additional Contrast?:   None     Decision Support Exception - unselect if not a suspected or confirmed emergency medical condition:   Emergency Medical Condition (MA) [1]    CBC with Auto Differential     Standing Status:   Standing     Number of Occurrences:   1    CMP     Standing Status:   Standing     Number of Occurrences:   1    Urinalysis     Standing Status:   Standing     Number of Occurrences:   1    Microscopic Urinalysis     Standing Status:   Standing     Number of Occurrences:   1         CLINICAL DECISION MAKING:  The patient presented alert with a nontoxic appearance and was seen in conjunction with Dr. Goldberger who also evaluated the patient.     The patient was discussed with the ED attending.    DDx include, but are not limited to: UTI, pyelonephritis, kidney stone    The patient was involved in his/her plan of care through shared decision making. The testing that was ordered was discussed with the patient. Any medications that may have been ordered were discussed with the patient.     I have reviewed the patient's previous medical records using the electronic health record that we have available that were pertinent to today's visit.      Patient is a nontoxic-appearing 79-year-old female who presented to the emergency department today for evaluation concerns for hematuria and a kidney stone.  Initial examination vital signs overall stable.  No acute abdominal findings on exam.  Patient did receive supportive measures of Toradol and Zofran and on reevaluation she reported some improvement in condition.  Labs stable and show no

## 2025-04-08 NOTE — ED TRIAGE NOTES
Patient comes to the ED with increasing urinary frequency and right-sided flank pain that radiates to her back. States that she was seen by urgent care one week ago for similar symptoms, which had subsided until today. History of kidney stones, here for eval.

## 2025-04-09 NOTE — ED NOTES
Pt called and stated insurance will not pay for antibiotic and it is 148$. Spoke with Dr Goldberger and antibiotic was changed.   4/9/2025  [id number]

## 2025-08-17 ENCOUNTER — HOSPITAL ENCOUNTER (EMERGENCY)
Age: 80
Discharge: HOME | End: 2025-08-17
Attending: STUDENT IN AN ORGANIZED HEALTH CARE EDUCATION/TRAINING PROGRAM
Payer: MEDICARE

## 2025-08-17 VITALS
RESPIRATION RATE: 18 BRPM | HEART RATE: 78 BPM | WEIGHT: 186 LBS | HEIGHT: 61 IN | OXYGEN SATURATION: 95 % | TEMPERATURE: 98.2 F | DIASTOLIC BLOOD PRESSURE: 99 MMHG | SYSTOLIC BLOOD PRESSURE: 177 MMHG | BODY MASS INDEX: 35.12 KG/M2
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
## 2025-08-17 PROCEDURE — 90471 IMMUNIZATION ADMIN: CPT | Performed by: STUDENT IN AN ORGANIZED HEALTH CARE EDUCATION/TRAINING PROGRAM

## 2025-08-17 PROCEDURE — 6360000002 HC RX W HCPCS: Performed by: STUDENT IN AN ORGANIZED HEALTH CARE EDUCATION/TRAINING PROGRAM

## 2025-08-17 PROCEDURE — 99284 EMERGENCY DEPT VISIT MOD MDM: CPT

## 2025-08-17 PROCEDURE — 6370000000 HC RX 637 (ALT 250 FOR IP): Performed by: STUDENT IN AN ORGANIZED HEALTH CARE EDUCATION/TRAINING PROGRAM

## 2025-08-17 PROCEDURE — 90715 TDAP VACCINE 7 YRS/> IM: CPT | Performed by: STUDENT IN AN ORGANIZED HEALTH CARE EDUCATION/TRAINING PROGRAM

## 2025-08-17 RX ADMIN — TETANUS TOXOID, REDUCED DIPHTHERIA TOXOID AND ACELLULAR PERTUSSIS VACCINE, ADSORBED 0.5 ML: 5; 2.5; 8; 8; 2.5 SUSPENSION INTRAMUSCULAR at 22:59

## 2025-08-17 RX ADMIN — AMOXICILLIN AND CLAVULANATE POTASSIUM 1 TABLET: 875; 125 TABLET, FILM COATED ORAL at 22:59

## 2025-08-17 ASSESSMENT — PAIN SCALES - GENERAL: PAINLEVEL_OUTOF10: 10

## 2025-08-17 ASSESSMENT — PAIN - FUNCTIONAL ASSESSMENT: PAIN_FUNCTIONAL_ASSESSMENT: 0-10

## (undated) DEVICE — CONMED DISPOSABLE GASTROINTESTINAL CYTOLOGY BRUSH, STRAIGHT HANDLE, 2.5 MM X 160 CM: Brand: CONMED

## (undated) DEVICE — BASIN EMSIS 700ML GRAPHITE PLAS KID SHP GRAD

## (undated) DEVICE — GAUZE,SPONGE,4"X4",16PLY,STRL,LF,10/TRAY: Brand: MEDLINE

## (undated) DEVICE — GLOVE SURG SZ 8 L12IN THK91MIL BRN LTX FREE POLYCHLOROPRENE

## (undated) DEVICE — MEDICINE CUP, GRADUATED, STER: Brand: MEDLINE

## (undated) DEVICE — CUP MED 1OZ CLR POLYPR FEED GRAD W/O LID

## (undated) DEVICE — Device: Brand: DEFENDO VALVE AND CONNECTOR KIT

## (undated) DEVICE — ADAPTER TBNG LUER STUB 15 GA INTMED

## (undated) DEVICE — FORCEPS BX L240CM WRK CHN 2.8MM STD CAP W/ NDL MIC MESH

## (undated) DEVICE — JELLY,LUBE,STERILE,FLIP TOP,TUBE,2-OZ: Brand: MEDLINE

## (undated) DEVICE — CO2 CANNULA,SUPERSOFT, ADLT,7'O2,7'CO2: Brand: MEDLINE

## (undated) DEVICE — BLOCK BITE 60FR RUBBER ADLT DENTAL